# Patient Record
Sex: FEMALE | Race: OTHER | Employment: UNEMPLOYED | URBAN - METROPOLITAN AREA
[De-identification: names, ages, dates, MRNs, and addresses within clinical notes are randomized per-mention and may not be internally consistent; named-entity substitution may affect disease eponyms.]

---

## 2019-01-21 ENCOUNTER — HOSPITAL ENCOUNTER (OUTPATIENT)
Dept: GENERAL RADIOLOGY | Age: 34
Discharge: HOME | End: 2019-01-23
Payer: MEDICAID

## 2019-01-21 DIAGNOSIS — R05.9 COUGH: ICD-10-CM

## 2019-01-21 DIAGNOSIS — M54.32 SCIATICA OF LEFT SIDE: ICD-10-CM

## 2019-01-21 PROCEDURE — 72110 X-RAY EXAM L-2 SPINE 4/>VWS: CPT

## 2019-01-21 PROCEDURE — 71046 X-RAY EXAM CHEST 2 VIEWS: CPT

## 2019-03-22 ENCOUNTER — HOSPITAL ENCOUNTER (OUTPATIENT)
Dept: MRI IMAGING | Age: 34
Discharge: HOME | End: 2019-03-24
Payer: COMMERCIAL

## 2019-03-22 DIAGNOSIS — M54.42 CHRONIC LEFT-SIDED LOW BACK PAIN WITH LEFT-SIDED SCIATICA: ICD-10-CM

## 2019-03-22 DIAGNOSIS — G89.29 CHRONIC LEFT-SIDED LOW BACK PAIN WITH LEFT-SIDED SCIATICA: ICD-10-CM

## 2019-03-22 PROCEDURE — 72148 MRI LUMBAR SPINE W/O DYE: CPT

## 2021-02-07 ENCOUNTER — HOSPITAL ENCOUNTER (OUTPATIENT)
Dept: MRI IMAGING | Age: 36
Discharge: HOME | End: 2021-02-09
Payer: COMMERCIAL

## 2021-02-07 DIAGNOSIS — M54.16 LUMBAR RADICULOPATHY: ICD-10-CM

## 2021-02-07 PROCEDURE — 72158 MRI LUMBAR SPINE W/O & W/DYE: CPT

## 2021-02-07 PROCEDURE — 6360000004 HC RX CONTRAST MEDICATION: Performed by: ANESTHESIOLOGY

## 2021-02-07 PROCEDURE — A9579 GAD-BASE MR CONTRAST NOS,1ML: HCPCS | Performed by: ANESTHESIOLOGY

## 2021-02-07 RX ADMIN — GADOTERIDOL 15 ML: 279.3 INJECTION, SOLUTION INTRAVENOUS at 13:09

## 2021-08-27 ENCOUNTER — HOSPITAL ENCOUNTER (EMERGENCY)
Age: 36
Discharge: LEFT BEFORE DISCHARGE | End: 2021-08-27
Attending: EMERGENCY MEDICINE
Payer: COMMERCIAL

## 2021-08-27 VITALS
DIASTOLIC BLOOD PRESSURE: 62 MMHG | OXYGEN SATURATION: 100 % | BODY MASS INDEX: 28.16 KG/M2 | RESPIRATION RATE: 18 BRPM | WEIGHT: 153 LBS | TEMPERATURE: 97.6 F | HEIGHT: 62 IN | SYSTOLIC BLOOD PRESSURE: 105 MMHG | HEART RATE: 100 BPM

## 2021-08-27 DIAGNOSIS — R20.2 PARESTHESIA: Primary | ICD-10-CM

## 2021-08-27 DIAGNOSIS — M54.40 ACUTE LOW BACK PAIN WITH SCIATICA, SCIATICA LATERALITY UNSPECIFIED, UNSPECIFIED BACK PAIN LATERALITY: ICD-10-CM

## 2021-08-27 LAB
ANION GAP SERPL CALCULATED.3IONS-SCNC: 9 MEQ/L (ref 9–15)
BASOPHILS ABSOLUTE: 0.1 K/UL (ref 0–0.2)
BASOPHILS RELATIVE PERCENT: 1 %
BILIRUBIN URINE: NEGATIVE
BLOOD, URINE: NEGATIVE
BUN BLDV-MCNC: 8 MG/DL (ref 6–20)
CALCIUM SERPL-MCNC: 9.8 MG/DL (ref 8.5–9.9)
CHLORIDE BLD-SCNC: 101 MEQ/L (ref 95–107)
CLARITY: CLEAR
CO2: 25 MEQ/L (ref 20–31)
COLOR: YELLOW
CREAT SERPL-MCNC: 0.54 MG/DL (ref 0.5–0.9)
EOSINOPHILS ABSOLUTE: 0 K/UL (ref 0–0.7)
EOSINOPHILS RELATIVE PERCENT: 0.7 %
GFR AFRICAN AMERICAN: >60
GFR NON-AFRICAN AMERICAN: >60
GLUCOSE BLD-MCNC: 97 MG/DL (ref 70–99)
GLUCOSE URINE: NEGATIVE MG/DL
HCG(URINE) PREGNANCY TEST: NEGATIVE
HCT VFR BLD CALC: 39.4 % (ref 37–47)
HEMOGLOBIN: 13.5 G/DL (ref 12–16)
KETONES, URINE: NEGATIVE MG/DL
LEUKOCYTE ESTERASE, URINE: NEGATIVE
LYMPHOCYTES ABSOLUTE: 1.6 K/UL (ref 1–4.8)
LYMPHOCYTES RELATIVE PERCENT: 23.7 %
MCH RBC QN AUTO: 30.7 PG (ref 27–31.3)
MCHC RBC AUTO-ENTMCNC: 34.2 % (ref 33–37)
MCV RBC AUTO: 89.9 FL (ref 82–100)
MONOCYTES ABSOLUTE: 0.4 K/UL (ref 0.2–0.8)
MONOCYTES RELATIVE PERCENT: 6.4 %
NEUTROPHILS ABSOLUTE: 4.6 K/UL (ref 1.4–6.5)
NEUTROPHILS RELATIVE PERCENT: 68.2 %
NITRITE, URINE: NEGATIVE
PDW BLD-RTO: 13.8 % (ref 11.5–14.5)
PH UA: 5 (ref 5–9)
PLATELET # BLD: 470 K/UL (ref 130–400)
POTASSIUM SERPL-SCNC: 4.6 MEQ/L (ref 3.4–4.9)
PROTEIN UA: NEGATIVE MG/DL
RBC # BLD: 4.38 M/UL (ref 4.2–5.4)
SODIUM BLD-SCNC: 135 MEQ/L (ref 135–144)
SPECIFIC GRAVITY UA: 1.01 (ref 1–1.03)
UROBILINOGEN, URINE: 0.2 E.U./DL
WBC # BLD: 6.8 K/UL (ref 4.8–10.8)

## 2021-08-27 PROCEDURE — 81003 URINALYSIS AUTO W/O SCOPE: CPT

## 2021-08-27 PROCEDURE — 84703 CHORIONIC GONADOTROPIN ASSAY: CPT

## 2021-08-27 PROCEDURE — 85025 COMPLETE CBC W/AUTO DIFF WBC: CPT

## 2021-08-27 PROCEDURE — 36415 COLL VENOUS BLD VENIPUNCTURE: CPT

## 2021-08-27 PROCEDURE — 99283 EMERGENCY DEPT VISIT LOW MDM: CPT

## 2021-08-27 PROCEDURE — 80048 BASIC METABOLIC PNL TOTAL CA: CPT

## 2021-08-27 PROCEDURE — 2580000003 HC RX 258: Performed by: EMERGENCY MEDICINE

## 2021-08-27 PROCEDURE — 6370000000 HC RX 637 (ALT 250 FOR IP): Performed by: EMERGENCY MEDICINE

## 2021-08-27 RX ORDER — SODIUM CHLORIDE 0.9 % (FLUSH) 0.9 %
3 SYRINGE (ML) INJECTION EVERY 8 HOURS
Status: DISCONTINUED | OUTPATIENT
Start: 2021-08-27 | End: 2021-08-27 | Stop reason: HOSPADM

## 2021-08-27 RX ORDER — NICOTINE 21 MG/24HR
1 PATCH, TRANSDERMAL 24 HOURS TRANSDERMAL ONCE
Status: DISCONTINUED | OUTPATIENT
Start: 2021-08-27 | End: 2021-08-27 | Stop reason: HOSPADM

## 2021-08-27 RX ADMIN — Medication 3 ML: at 10:33

## 2021-08-27 ASSESSMENT — ENCOUNTER SYMPTOMS
SORE THROAT: 0
NAUSEA: 1
DIARRHEA: 0
EYE PAIN: 0
COLOR CHANGE: 0
ABDOMINAL PAIN: 0
VOMITING: 0
SHORTNESS OF BREATH: 0
COUGH: 0
SINUS PAIN: 0
EYE REDNESS: 0
EYE DISCHARGE: 0
BACK PAIN: 1

## 2021-08-27 ASSESSMENT — PAIN SCALES - GENERAL: PAINLEVEL_OUTOF10: 10

## 2021-08-27 ASSESSMENT — PAIN DESCRIPTION - PAIN TYPE: TYPE: ACUTE PAIN

## 2021-08-27 ASSESSMENT — PAIN DESCRIPTION - LOCATION: LOCATION: HEAD

## 2021-08-27 NOTE — ED NOTES
Pt left AMA before transfer could be completed.    Pt explained the risks     Radha Cruz RN  08/27/21 0583

## 2021-08-27 NOTE — ED PROVIDER NOTES
[address] [facility] ED  EMERGENCY DEPARTMENT ENCOUNTER      Pt Name: Krystal Hinojosa  MRN: [id number]  Brentongfcarolyn 1985  Date of evaluation: 8/27/2021  Provider: Dominique Jacobson [address] [place] [address]       Chief Complaint   Patient presents with    Numbness     legs, arm, blurred vision, headaches since spinal sx 8/5/21     Chief complaint: Numb and tingling  History of chief complaint: This 64-year-old female presents to the emergency department complaining of some ongoing numbness tingling and generalized weakness in the extremities since having recent spinal surgery on August 5. Patient states this is her second back surgery had a microdiscectomy back in 2006 at the L4-L5 level had recurrent surgery at the [facility] on August 5 at that same level. Patient states following the surgery had immediate severe pain on her right side including her arm and leg and down the right side of her back. Patient states that improved and wishes left with numb tingling sensation more in the lower extremities and low back pain intermittently radiating into the buttocks and posterior leg bilaterally. Patient states the pain and numbness since then seem to progress up the spine and having neck pain, variable severe headaches that move around in location, numb tingling sensation in all 4 extremities worse on the right sided extremities and intermittent blurry vision. Patient denies any loss of bowel or bladder control does have some nausea with the pain no vomiting or diarrhea no fevers cough cold congestion bowels have been moving. Patient states that she called the surgeon's office multiple times they will not let her talk to the doctor and would move up the follow-up appointment scheduled for September 10. Patient states the office I finally told her to go to the [facility] clinic where she did go yesterday they advised her she just needed to go to the ER and have imaging done.   Patient states she was scheduled for an MRI on the 31st but learned yesterday that the insurance denied it. Patient states symptoms have been ongoing since surgery over the past 3 weeks. Nursing Notes were reviewed. REVIEW OF SYSTEMS    (2-9 systems for level 4, 10 or more for level 5)     Review of Systems   Constitutional: Positive for appetite change. Negative for diaphoresis and fever. HENT: Negative for congestion, sinus pain and sore throat. Eyes: Positive for visual disturbance. Negative for pain, discharge and redness. Respiratory: Negative for cough and shortness of breath. Cardiovascular: Negative for chest pain, palpitations and leg swelling. Gastrointestinal: Positive for nausea. Negative for abdominal pain, diarrhea and vomiting. Genitourinary: Negative for difficulty urinating, dysuria, flank pain and hematuria. Musculoskeletal: Positive for back pain and neck pain. Skin: Negative for color change and rash. Neurological: Positive for weakness, numbness and headaches. Except as noted above the remainder of the review of systems was reviewed and negative. PAST MEDICAL HISTORY     Past Medical History:   Diagnosis Date    ADHD (attention deficit hyperactivity disorder)     Asthma     exercise induced         SURGICAL HISTORY       Past Surgical History:   Procedure Laterality Date    BACK SURGERY      age 10    BREAST ENHANCEMENT SURGERY      COSMETIC SURGERY      breast implants    FINGER SURGERY      age 3    LIPOSUCTION           CURRENT MEDICATIONS       Previous Medications    ADDERALL XR 10 MG CAPSULE        MELATONIN 3 MG TABS TABLET    Take 3 mg by mouth daily    MELOXICAM (MOBIC) 7.5 MG TABLET        PRENATAL VIT-FE FUMARATE-FA (PRENATAL VITAMIN PO)    Take by mouth    ZIPRASIDONE (GEODON) 20 MG CAPSULE           ALLERGIES     Amoxicillin; Anesthetics, halogenated;  Oxycodone; and Topiramate    FAMILY HISTORY       Family History   Problem Relation Age of Onset    Breast Cancer Neg Hx     Cancer Neg Hx     Colon Cancer Neg Hx     Diabetes Neg Hx     Eclampsia Neg Hx     Hypertension Neg Hx     Ovarian Cancer Neg Hx      Labor Neg Hx     Spont Abortions Neg Hx     Stroke Neg Hx           SOCIAL HISTORY       Social History     Socioeconomic History    Marital status: Single     Spouse name: None    Number of children: None    Years of education: None    Highest education level: None   Occupational History    None   Tobacco Use    Smoking status: Current Every Day Smoker     Packs/day: 0.50    Smokeless tobacco: Never Used   Vaping Use    Vaping Use: Never used   Substance and Sexual Activity    Alcohol use: Yes     Alcohol/week: 0.0 standard drinks     Comment: ocassionally    Drug use: No    Sexual activity: Never   Other Topics Concern    None   Social History Narrative    None     Social Determinants of Health     Financial Resource Strain:     Difficulty of Paying Living Expenses:    Food Insecurity:     Worried About Running Out of Food in the Last Year:     Ran Out of Food in the Last Year:    Transportation Needs:     Lack of Transportation (Medical):      Lack of Transportation (Non-Medical):    Physical Activity:     Days of Exercise per Week:     Minutes of Exercise per Session:    Stress:     Feeling of Stress :    Social Connections:     Frequency of Communication with Friends and Family:     Frequency of Social Gatherings with Friends and Family:     Attends Synagogue Services:     Active Member of Clubs or Organizations:     Attends Club or Organization Meetings:     Marital Status:    Intimate Partner Violence:     Fear of Current or Ex-Partner:     Emotionally Abused:     Physically Abused:     Sexually Abused:            PHYSICAL EXAM    (up to 7 for level 4, 8 or more for level 5)     ED Triage Vitals [21 0930]   BP Temp Temp Source Pulse Resp SpO2 Height Weight   128/86 97.6 °F (36.4 °C) Temporal 106 18 100 % 5' 2\" (1.575 m) 153 lb (69.4 kg)       Physical Exam   General appearance: Patient is awake alert interactive appropriate nontoxic in no acute distress  Head is atraumatic normocephalic  Eyes pupils are equal and reactive sclera white conjunctive are pink  Oral pharyngeal cavity is pink with good moisture, no exudates or ulcerations no asymmetry, the airway is widely patent  Neck: Supple no meningeal signs no adenopathy no JVD  Heart: Regular rate and rhythm no gross murmurs rubs or clicks  Lungs: Breath sounds are clear with good air movement throughout no active wheezes rales or rhonchi no respiratory distress  Abdomen: Soft nontender with good bowel sounds no rebound rigidity or guarding no firm or pulsatile masses, no gross distention, femoral pulses full and symmetric  Back: There is tenderness to palpation diffusely through the lower lumbar area incision is well-healed no erythema warmth or purulence. Straight leg raise test is negative. No numbness in the groin.  skin: Warm and dry without rashes  Lower extremities: No edema or calf tenderness or asymmetry. Neurologic: Patient is awake alert oriented x3 speech is clear and fluent pupils are equal and reactive extraocular muscles are intact facial symmetry is intact tongue is midline strength testing is symmetric at 5 out of 5 bilaterally in the upper and lower extremities sensation is reported dull to touch in all 4 extremities more so on the right greater than the left, patient does report all her sensation to the right face as well. There is no pronator drift.     DIAGNOSTIC RESULTS       RADIOLOGY:   Non-plain film images such as CT, Ultrasound and MRI are read by the radiologist. Plain radiographic images are visualized and preliminarily interpreted by the emergency physician with the below findings:    Interpretation per the Radiologist below, if available at the time of this note:    No orders to display       LABS:  Labs Reviewed   [address] [facility] DIFFERENTIAL - Abnormal; Notable for the following components:       Result Value    Platelets 726 (*)     All other components within normal limits   CULTURE, BLOOD 1   CULTURE, BLOOD 2   BASIC METABOLIC PANEL   URINALYSIS   PREGNANCY, URINE       All other labs were within normal range or not returned as of this dictation. EMERGENCY DEPARTMENT COURSE and DIFFERENTIAL DIAGNOSIS/MDM:   Vitals:    Vitals:    08/27/21 0930 08/27/21 1030 08/27/21 1100 08/27/21 1130   BP: 128/86 97/70 105/63 105/62   Pulse: 106   100   Resp: 18      Temp: 97.6 °F (36.4 °C)      TempSrc: Temporal      SpO2: 100% 100% 100% 100%   Weight: 153 lb (69.4 kg)      Height: 5' 2\" (1.575 m)          Treatment and course: Patient had an IV established basic blood work was sent. Patient placed on ER observation awaiting bed availability for transport to the [facility]. NicoDerm patch 14 mg was applied    Coordination of CARE: A call was placed out to the [facility] to discuss transfer for neurosurgical evaluation and determination of imaging-I did speak with the patient's neurosurgeon Dr. Dori Kendrick who advised to transfer the patient up to [facility] for admission and will have neurosurgical evaluation there. Call was placed out to the [facility] transfer line I spoke with Dr. Nina Parekh hospitalist who will arrange admission with neurosurgery on consult. FINAL IMPRESSION      1. Paresthesia    2. Acute low back pain with sciatica, sciatica laterality unspecified, unspecified back pain laterality          DISPOSITION/PLAN   DISPOSITION Decision To Transfer 08/27/2021 10:20:38 AM  Patient awaiting a bed placement at the [facility] for transport. Addendum: Patient states she has to go her child school called her and he is missing, states she is having custody issues with his father now and has to go immediately.   Patient states she has to make sure he is safe and she will return to the hospital. Patient left the emergency department [address] I did stressed to patient the need for further neurosurgical evaluation she expresses understanding and states she will return as soon as possible    PATIENT REFERRED TO:  No follow-up provider specified. DISCHARGE MEDICATIONS:  New Prescriptions    No medications on file     Controlled Substances Monitoring:     No flowsheet data found.     (Please note that portions of this note were completed with a voice recognition program.  Efforts were made to edit the dictations but occasionally words are mis-transcribed.)    Florentino Guzman DO (electronically signed)  Attending Emergency Physician            Florentino Guzman DO  08/27/21 [facility]  08/27/21 [facility]

## 2021-09-03 PROBLEM — Z98.890 OTHER SPECIFIED POSTPROCEDURAL STATES: Status: ACTIVE | Noted: 2019-03-25

## 2021-09-03 PROBLEM — B36.0 TINEA VERSICOLOR: Status: ACTIVE | Noted: 2020-05-27

## 2021-09-03 PROBLEM — F31.9 BIPOLAR AFFECTIVE DISORDER (HCC): Status: ACTIVE | Noted: 2021-09-03

## 2021-09-03 PROBLEM — K22.9 ESOPHAGEAL DISORDER: Status: ACTIVE | Noted: 2019-03-25

## 2021-09-03 PROBLEM — M54.50 CHRONIC LEFT-SIDED LOW BACK PAIN: Status: ACTIVE | Noted: 2021-07-16

## 2021-09-03 PROBLEM — G89.29 CHRONIC LEFT-SIDED LOW BACK PAIN: Status: ACTIVE | Noted: 2021-07-16

## 2021-09-03 PROBLEM — L30.9 ECZEMA: Status: ACTIVE | Noted: 2020-05-27

## 2021-09-03 PROBLEM — D22.9 MULTIPLE BENIGN NEVI: Status: ACTIVE | Noted: 2020-05-27

## 2021-09-03 PROBLEM — R20.0 ANESTHESIA OF SKIN: Status: ACTIVE | Noted: 2019-03-25

## 2021-09-03 PROBLEM — Z88.0 ALLERGY STATUS TO PENICILLIN: Status: ACTIVE | Noted: 2019-03-25

## 2021-09-03 PROBLEM — G89.18 POSTOPERATIVE PAIN: Status: ACTIVE | Noted: 2021-08-05

## 2021-09-07 ENCOUNTER — OFFICE VISIT (OUTPATIENT)
Dept: NEUROLOGY | Age: 36
End: 2021-09-07
Payer: COMMERCIAL

## 2021-09-07 VITALS
BODY MASS INDEX: 28.68 KG/M2 | SYSTOLIC BLOOD PRESSURE: 130 MMHG | OXYGEN SATURATION: 98 % | DIASTOLIC BLOOD PRESSURE: 80 MMHG | WEIGHT: 156.8 LBS | HEART RATE: 98 BPM

## 2021-09-07 DIAGNOSIS — R20.2 PARESTHESIA OF BOTH LOWER EXTREMITIES: Primary | ICD-10-CM

## 2021-09-07 DIAGNOSIS — G44.52 NEW DAILY PERSISTENT HEADACHE: ICD-10-CM

## 2021-09-07 DIAGNOSIS — Z76.89 ENCOUNTER TO ESTABLISH CARE: ICD-10-CM

## 2021-09-07 DIAGNOSIS — Z98.890 S/P LUMBAR LAMINECTOMY: ICD-10-CM

## 2021-09-07 PROCEDURE — 4004F PT TOBACCO SCREEN RCVD TLK: CPT | Performed by: NURSE PRACTITIONER

## 2021-09-07 PROCEDURE — G8419 CALC BMI OUT NRM PARAM NOF/U: HCPCS | Performed by: NURSE PRACTITIONER

## 2021-09-07 PROCEDURE — 99204 OFFICE O/P NEW MOD 45 MIN: CPT | Performed by: NURSE PRACTITIONER

## 2021-09-07 PROCEDURE — G8427 DOCREV CUR MEDS BY ELIG CLIN: HCPCS | Performed by: NURSE PRACTITIONER

## 2021-09-07 RX ORDER — PHENTERMINE HYDROCHLORIDE 37.5 MG/1
TABLET ORAL
COMMUNITY
Start: 2021-07-27 | End: 2021-09-20 | Stop reason: ALTCHOICE

## 2021-09-07 RX ORDER — BUTALBITAL, ACETAMINOPHEN AND CAFFEINE 50; 325; 40 MG/1; MG/1; MG/1
1 TABLET ORAL EVERY 8 HOURS PRN
Qty: 21 TABLET | Refills: 3 | Status: SHIPPED | OUTPATIENT
Start: 2021-09-07 | End: 2021-10-22

## 2021-09-07 RX ORDER — SUMATRIPTAN 50 MG/1
50 TABLET, FILM COATED ORAL
Qty: 9 TABLET | Refills: 3 | Status: SHIPPED | OUTPATIENT
Start: 2021-09-07 | End: 2021-10-22 | Stop reason: ALTCHOICE

## 2021-09-07 RX ORDER — CYCLOBENZAPRINE HCL 10 MG
10 TABLET ORAL 2 TIMES DAILY PRN
COMMUNITY
End: 2022-08-05 | Stop reason: SDUPTHER

## 2021-09-07 RX ORDER — PHENTERMINE HYDROCHLORIDE 30 MG/1
CAPSULE ORAL
COMMUNITY
Start: 2021-06-27 | End: 2021-09-20 | Stop reason: ALTCHOICE

## 2021-09-07 RX ORDER — METHOCARBAMOL 750 MG/1
TABLET, FILM COATED ORAL
COMMUNITY
Start: 2021-08-05 | End: 2021-09-20 | Stop reason: ALTCHOICE

## 2021-09-07 ASSESSMENT — ENCOUNTER SYMPTOMS
COUGH: 0
COLOR CHANGE: 0
CHEST TIGHTNESS: 0
DIARRHEA: 0
WHEEZING: 0
CONSTIPATION: 0
VOMITING: 0
PHOTOPHOBIA: 1
ABDOMINAL PAIN: 0
ABDOMINAL DISTENTION: 0
TROUBLE SWALLOWING: 0
NAUSEA: 1
SHORTNESS OF BREATH: 0
BACK PAIN: 1

## 2021-09-07 NOTE — PROGRESS NOTES
on 8/26/2021. Urgent care sent her to the emergency room for further evaluation. Patient went to [facility] emergency department on 8/27/2021. At that time they planned on transferring patient to the [facility] to be admitted under the neurosurgery service. Patient actually signed out of the emergency room AMA at that time due to social issues. Patient then presented back to [facility] emergency room on 8/28/2021. At that time she had MRI of the cervical, thoracic, lumbar spine. Results showed postoperative changes with residual/recurrent left paracentral disc protrusion/extrusion at L4-5 likely impinging on the descending left L4 nerve root in combination with epidural scarring/fibrosis contributing to moderate effacement of the left neural mendoza and contacting the exiting left L4 nerve root. Patient was discharged home with plans for follow-up with neurosurgery outpatient. She was also referred to neurology but declined to see anyone at the [facility] clinic and presented here for further evaluation. Patient reports a lot of frustration with her surgeon as she has had difficulty coordinating care and being seen. She does have an appointment on 9/10/2021 with neurosurgery but this is a virtual visit and she is not being seen in person. Patient presents today alert and oriented x3, no acute distress, cooperative. She reports continued paresthesias in all of her extremities from the shoulders to the fingertips and the hips to the toes. She reports some occasional weakness grasping things. Denies foot drop, tripping, legs giving out or falls. Denies bowel or bladder dysfunction or saddle anesthesia. No fevers. Lower extremity reflexes are actually 2+ and intact today. There are no focal findings on her exam.  Strength is intact throughout 5 out of 5 with the exception of hand grasp which are 4 out of 5. Surgical incision looks well approximated and healed.   There is no perisurgical erythema, fluctuance, induration. Patient is ambulating without difficulty. In regards to her headaches she states that she began having a headache around August 23, 2021. She describes it as an ice pick to the left side in the temporal region that goes into the back of her neck. She rates it 10 out of 10 and describes it as constant. She reports she will only have approximately 2 hours out of the day where she does not have a headache. She states she has associated nausea, photophobia, phonophobia, and spots in her vision with headaches. She denies any history of migraine headaches with the exception of when she was on birth control which she states caused her to have similar headaches. Headaches do improve with lying down. Past Medical History:   Diagnosis Date    ADHD (attention deficit hyperactivity disorder)     Asthma     exercise induced    [facility] 9/3/2021     Past Surgical History:   Procedure Laterality Date    BACK SURGERY      age 10    BREAST ENHANCEMENT SURGERY      COSMETIC SURGERY      breast implants    FINGER SURGERY      age 3    LIPOSUCTION       Social History     Socioeconomic History    Marital status: Single     Spouse name: Not on file    Number of children: Not on file    Years of education: Not on file    Highest education level: Not on file   Occupational History    Not on file   Tobacco Use    Smoking status: Current Every Day Smoker     Packs/day: 0.50    Smokeless tobacco: Never Used   Vaping Use    Vaping Use: Never used   Substance and Sexual Activity    Alcohol use:  Yes     Alcohol/week: 0.0 standard drinks     Comment: ocassionally    Drug use: No    Sexual activity: Never   Other Topics Concern    Not on file   Social History Narrative    Not on file     Social Determinants of Health     Financial Resource Strain:     Difficulty of Paying Living Expenses:    Food Insecurity:     Worried About Running Out of Food in the Last Year:     Ran Out of Food in the Last Year:    Transportation Needs:     Lack of Transportation (Medical):      Lack of Transportation (Non-Medical):    Physical Activity:     Days of Exercise per Week:     Minutes of Exercise per Session:    Stress:     Feeling of Stress :    Social Connections:     Frequency of Communication with Friends and Family:     Frequency of Social Gatherings with Friends and Family:     Attends Hoahaoism Services:     Active Member of Clubs or Organizations:     Attends Club or Organization Meetings:     Marital Status:    Intimate Partner Violence:     Fear of Current or Ex-Partner:     Emotionally Abused:     Physically Abused:     Sexually Abused:      Family History   Problem Relation Age of Onset    Breast Cancer Neg Hx     Cancer Neg Hx     Colon Cancer Neg Hx     Diabetes Neg Hx     Eclampsia Neg Hx     Hypertension Neg Hx     Ovarian Cancer Neg Hx      Labor Neg Hx     Spont Abortions Neg Hx     Stroke Neg Hx      Allergies   Allergen Reactions    Amoxicillin Hives    Anesthetics, Halogenated      Hard to come out of it, hyperventilating, convulsion, nonresponsive    Oxycodone Nausea Only    Oxycodone-Acetaminophen Hives and Nausea And Vomiting    Topiramate Rash     Current Outpatient Medications on File Prior to Visit   Medication Sig Dispense Refill    phentermine (ADIPEX-P) 37.5 MG tablet TAKE ONE TABLET BY MOUTH EVERY DAY      phentermine 30 MG capsule TAKE ONE CAPSULE BY MOUTH EVERY DAY      methocarbamol (ROBAXIN) 750 MG tablet TAKE 1 TABLET BY MOUTH 3 TIMES A DAY AS NEEDED FOR MUSCLE SPASM      cyclobenzaprine (FLEXERIL) 10 MG tablet Take 10 mg by mouth 2 times daily      Prenatal Vit-Fe Fumarate-FA (PRENATAL VITAMIN PO) Take by mouth (Patient not taking: Reported on 2021)      melatonin 3 MG TABS tablet Take 3 mg by mouth daily (Patient not taking: Reported on 2021)      ADDERALL XR 10 MG capsule  (Patient not taking: Reported on 9/7/2021)  0    meloxicam (MOBIC) 7.5 MG tablet  (Patient not taking: Reported on 9/7/2021)  1    ziprasidone (GEODON) 20 MG capsule  (Patient not taking: Reported on 9/7/2021)  3     No current facility-administered medications on file prior to visit. Review of Systems   Constitutional: Negative for appetite change, chills, fatigue and fever. HENT: Negative for hearing loss and trouble swallowing. Eyes: Positive for photophobia and visual disturbance. Respiratory: Negative for cough, chest tightness, shortness of breath and wheezing. Cardiovascular: Negative for chest pain, palpitations and leg swelling. Gastrointestinal: Positive for nausea. Negative for abdominal distention, abdominal pain, constipation, diarrhea and vomiting. Genitourinary: Negative for difficulty urinating. Musculoskeletal: Positive for back pain. Negative for gait problem, neck pain and neck stiffness. Skin: Negative for color change and rash. Neurological: Positive for weakness and headaches. Negative for dizziness, tremors, seizures, syncope, facial asymmetry, speech difficulty, light-headedness and numbness. Psychiatric/Behavioral: Negative for agitation, confusion and hallucinations. The patient is not nervous/anxious. Objective:   /80 (Site: Left Upper Arm, Position: Sitting, Cuff Size: Medium Adult)   Pulse 98   Wt 156 lb 12.8 oz (71.1 kg)   LMP 08/20/2021   SpO2 98%   BMI 28.68 kg/m²     Physical Exam  Vitals reviewed. Constitutional:       General: She is not in acute distress. Appearance: She is not ill-appearing or diaphoretic. HENT:      Head: Normocephalic and atraumatic. Eyes:      General: No visual field deficit. Extraocular Movements: Extraocular movements intact. Pupils: Pupils are equal, round, and reactive to light. Cardiovascular:      Rate and Rhythm: Normal rate and regular rhythm.    Pulmonary:      Effort: Pulmonary effort is normal. No spine and these were reviewed. Reflexes are intact and neuro exam is normal.  Patient's case and MRI findings were discussed in detail with Dr. Kenneth Stokes. Patient was advised that she will need to follow-up with her neurosurgeon. We recommend that she be seen in person rather than a virtual visit given the ongoing nature of her complaints. 4. Encounter to establish care      Return in about 6 weeks (around 10/19/2021), or if symptoms worsen or fail to improve.     Darell Mittal APRN - CNP     Collaborating Physician Dr Jean-Paul Blackwell

## 2021-09-09 ENCOUNTER — TELEPHONE (OUTPATIENT)
Dept: NEUROLOGY | Age: 36
End: 2021-09-09

## 2021-09-09 DIAGNOSIS — G44.52 NEW DAILY PERSISTENT HEADACHE: Primary | ICD-10-CM

## 2021-09-09 DIAGNOSIS — H47.10 PAPILLEDEMA: ICD-10-CM

## 2021-09-09 DIAGNOSIS — H53.9 VISION CHANGES: ICD-10-CM

## 2021-09-09 NOTE — TELEPHONE ENCOUNTER
Dr. Cesario Dancer called in regards to this patient. She states that patient was in for eye exam and she would like to speak with you in regards to this and has asked for a returned call. Her personal number is [phone] and she states that you can call at any time.

## 2021-09-10 NOTE — TELEPHONE ENCOUNTER
Spoke with patient and was updated on her visit with the neurosurgeon today. She reports he would like to complete the work-up with MRI and MRV prior to any further recommendations from him. I change these orders to stat.

## 2021-09-10 NOTE — TELEPHONE ENCOUNTER
Called and spoke with Dr Tony Martin who states that at pt appt yesterday there was one small area of possible elevation/papilledema. Pt to follow up next week for another exam. Called and spoke with pt today who is on her way to neurosurgery appt at main [facility]. She reports ongoing headaches and vision changes. She has tried imitrex and fioricet with little rleif. Will order MRI and MRV to assess for IIH/venous stenosis or thrombosis. Pt to call and update me after neurosurgery appt. Will discuss topamax at that time.

## 2021-09-14 ENCOUNTER — HOSPITAL ENCOUNTER (OUTPATIENT)
Dept: MRI IMAGING | Age: 36
Discharge: HOME | End: 2021-09-16
Payer: COMMERCIAL

## 2021-09-14 DIAGNOSIS — H53.9 VISION CHANGES: ICD-10-CM

## 2021-09-14 DIAGNOSIS — G44.52 NEW DAILY PERSISTENT HEADACHE: ICD-10-CM

## 2021-09-14 DIAGNOSIS — H47.10 PAPILLEDEMA: ICD-10-CM

## 2021-09-14 PROCEDURE — 70544 MR ANGIOGRAPHY HEAD W/O DYE: CPT

## 2021-09-14 PROCEDURE — 70551 MRI BRAIN STEM W/O DYE: CPT

## 2021-09-15 ENCOUNTER — TELEPHONE (OUTPATIENT)
Dept: NEUROLOGY | Age: 36
End: 2021-09-15

## 2021-09-15 DIAGNOSIS — G43.119 INTRACTABLE MIGRAINE WITH AURA WITHOUT STATUS MIGRAINOSUS: Primary | ICD-10-CM

## 2021-09-15 RX ORDER — RIMEGEPANT SULFATE 75 MG/75MG
75 TABLET, ORALLY DISINTEGRATING ORAL DAILY PRN
Qty: 8 TABLET | Refills: 3 | Status: SHIPPED | OUTPATIENT
Start: 2021-09-15 | End: 2021-10-11 | Stop reason: SDUPTHER

## 2021-09-15 NOTE — TELEPHONE ENCOUNTER
Reviewed results of MRI and MRV which were normal.  There is no evidence of venous sinus stenosis or thrombosis. Called and discussed with patient. She still having ongoing headaches with associated blurred vision, dizziness. Has tried Imitrex and Fioricet with no improvement. Will initiate Nurtec for abortive treatment. Offered preventative treatment but patient declined at this time.

## 2021-09-20 ENCOUNTER — OFFICE VISIT (OUTPATIENT)
Dept: FAMILY MEDICINE CLINIC | Age: 36
End: 2021-09-20
Payer: COMMERCIAL

## 2021-09-20 VITALS
SYSTOLIC BLOOD PRESSURE: 100 MMHG | HEIGHT: 62 IN | OXYGEN SATURATION: 98 % | HEART RATE: 103 BPM | BODY MASS INDEX: 28.78 KG/M2 | WEIGHT: 156.4 LBS | DIASTOLIC BLOOD PRESSURE: 60 MMHG

## 2021-09-20 DIAGNOSIS — H53.9 VISION CHANGES: ICD-10-CM

## 2021-09-20 DIAGNOSIS — K21.9 GASTROESOPHAGEAL REFLUX DISEASE, UNSPECIFIED WHETHER ESOPHAGITIS PRESENT: ICD-10-CM

## 2021-09-20 DIAGNOSIS — G43.909 MIGRAINE WITHOUT STATUS MIGRAINOSUS, NOT INTRACTABLE, UNSPECIFIED MIGRAINE TYPE: ICD-10-CM

## 2021-09-20 DIAGNOSIS — Z76.89 ENCOUNTER TO ESTABLISH CARE: Primary | ICD-10-CM

## 2021-09-20 DIAGNOSIS — Z23 NEED FOR INFLUENZA VACCINATION: ICD-10-CM

## 2021-09-20 DIAGNOSIS — Z98.890 STATUS POST LUMBAR LAMINECTOMY: ICD-10-CM

## 2021-09-20 DIAGNOSIS — R20.2 PARESTHESIAS: ICD-10-CM

## 2021-09-20 PROCEDURE — 4004F PT TOBACCO SCREEN RCVD TLK: CPT | Performed by: STUDENT IN AN ORGANIZED HEALTH CARE EDUCATION/TRAINING PROGRAM

## 2021-09-20 PROCEDURE — G8419 CALC BMI OUT NRM PARAM NOF/U: HCPCS | Performed by: STUDENT IN AN ORGANIZED HEALTH CARE EDUCATION/TRAINING PROGRAM

## 2021-09-20 PROCEDURE — 90471 IMMUNIZATION ADMIN: CPT | Performed by: STUDENT IN AN ORGANIZED HEALTH CARE EDUCATION/TRAINING PROGRAM

## 2021-09-20 PROCEDURE — 99204 OFFICE O/P NEW MOD 45 MIN: CPT | Performed by: STUDENT IN AN ORGANIZED HEALTH CARE EDUCATION/TRAINING PROGRAM

## 2021-09-20 PROCEDURE — G8427 DOCREV CUR MEDS BY ELIG CLIN: HCPCS | Performed by: STUDENT IN AN ORGANIZED HEALTH CARE EDUCATION/TRAINING PROGRAM

## 2021-09-20 PROCEDURE — 90674 CCIIV4 VAC NO PRSV 0.5 ML IM: CPT | Performed by: STUDENT IN AN ORGANIZED HEALTH CARE EDUCATION/TRAINING PROGRAM

## 2021-09-20 RX ORDER — ESOMEPRAZOLE MAGNESIUM 20 MG/1
20 FOR SUSPENSION ORAL 2 TIMES DAILY
COMMUNITY
End: 2021-09-20 | Stop reason: ALTCHOICE

## 2021-09-20 SDOH — ECONOMIC STABILITY: FOOD INSECURITY: WITHIN THE PAST 12 MONTHS, YOU WORRIED THAT YOUR FOOD WOULD RUN OUT BEFORE YOU GOT MONEY TO BUY MORE.: NEVER TRUE

## 2021-09-20 SDOH — ECONOMIC STABILITY: FOOD INSECURITY: WITHIN THE PAST 12 MONTHS, THE FOOD YOU BOUGHT JUST DIDN'T LAST AND YOU DIDN'T HAVE MONEY TO GET MORE.: NEVER TRUE

## 2021-09-20 SDOH — ECONOMIC STABILITY: TRANSPORTATION INSECURITY
IN THE PAST 12 MONTHS, HAS THE LACK OF TRANSPORTATION KEPT YOU FROM MEDICAL APPOINTMENTS OR FROM GETTING MEDICATIONS?: NO

## 2021-09-20 SDOH — ECONOMIC STABILITY: TRANSPORTATION INSECURITY
IN THE PAST 12 MONTHS, HAS LACK OF TRANSPORTATION KEPT YOU FROM MEETINGS, WORK, OR FROM GETTING THINGS NEEDED FOR DAILY LIVING?: NO

## 2021-09-20 ASSESSMENT — PATIENT HEALTH QUESTIONNAIRE - PHQ9
SUM OF ALL RESPONSES TO PHQ QUESTIONS 1-9: 0
SUM OF ALL RESPONSES TO PHQ9 QUESTIONS 1 & 2: 0
2. FEELING DOWN, DEPRESSED OR HOPELESS: 0
SUM OF ALL RESPONSES TO PHQ QUESTIONS 1-9: 0
1. LITTLE INTEREST OR PLEASURE IN DOING THINGS: 0
SUM OF ALL RESPONSES TO PHQ QUESTIONS 1-9: 0

## 2021-09-20 ASSESSMENT — SOCIAL DETERMINANTS OF HEALTH (SDOH): HOW HARD IS IT FOR YOU TO PAY FOR THE VERY BASICS LIKE FOOD, HOUSING, MEDICAL CARE, AND HEATING?: SOMEWHAT HARD

## 2021-09-20 ASSESSMENT — ENCOUNTER SYMPTOMS
COUGH: 0
ABDOMINAL PAIN: 0
DIARRHEA: 0
SORE THROAT: 0
EYE DISCHARGE: 0
NAUSEA: 0
WHEEZING: 0
RHINORRHEA: 0
VOMITING: 0
BACK PAIN: 1
SHORTNESS OF BREATH: 0

## 2021-09-20 NOTE — PATIENT INSTRUCTIONS
Family and Housing Resources    [address]  Call 211  or - www. [address] [place] [address]. [address] [place] [address])  Call - [phone]  [email or web address] [facility]  [address]  [place], [facility] [address]  [facility] [address]  [address] [facility] [address]  [phone] /[phone]    Medicaid Application  Https://benefits. [place].gov/  [phone]    Home Energy Assistance Programs (HEAP)  [address]. [facility], [address] [facility]  [phone]    [facility] ( shelter)  [address] [facility], [address]    [facility] (halfway)  [address], [address] [facility]  [address] [facility]  www. [email or web address]    [employer]  [address] [place] [address], [address]  [address] [facility]  [address]   [facility] [address] [address] [facility]. .[facility] at [address] [facility]

## 2021-09-20 NOTE — PROGRESS NOTES
Vaccine Information Sheet, \"Influenza - Inactivated\"  given to Peter Grossman, or parent/legal guardian of  Tennis Opal and verbalized understanding. Patient responses:    Have you ever had a reaction to a flu vaccine? NO    Are you able to eat eggs without adverse effects? YES    Do you have any current illness? NO    Have you ever had Guillian [place] Syndrome? NO    Flu vaccine given per order. Please see immunization tab.

## 2021-09-20 NOTE — PROGRESS NOTES
Subjective  Rosaura Jernigan, 29 y.o. female presents today with:  Chief Complaint   Patient presents with   [address]     Was being seen by Dr. Lacy Gonzalez prior.  Numbness     Has numbness & tingling down both legs & arms since back surgery in Aug. 2021.  Migraine     Has been having migraines everyday since having back surgery.  Blurred Vision     Has been having blurred vision with spots since her back surgery as well. Did see any eye Dr. Kiana Wyatt Other     Has been having complications since back surgery in Aug. Did see neuro surgeon last week was told to see nurologist; seen neurologist had MRI brain done. Was told to see PCP. HPI    Patient here to establish care. She was previously seeing Dr. Duncan More. She has had multiple issues over the last 6 or so weeks. She had an extensive back surgery done in August.  She states that she has been having complications from that since then. She states she is having numbness and tingling in both arms and legs since her back surgery. She has been having every day migraine since having her back surgery. She states she is also been having issues with her vision since her back surgery. She last saw her surgeon on the 10th. This was a virtual visit. Patient reports that he is not doing much for her. She does not have a future appointment scheduled. She is planning on calling the office today to do this. She states that she was told it was likely from the anesthesia. She states that she has not gotten a good answer for her symptoms. She was then referred to neurology. She had an appointment with them a little over a week ago. She states that they did order an MRI and MRV of her brain. These were normal.  She was noted to have papilledema. She was evaluated by ophthalmology. She was also started on migraine medication. She states that she is following up with them in about 4 weeks.   She states that she is getting very little relief rales.   Abdominal:      General: Bowel sounds are normal. There is no distension. Palpations: Abdomen is soft. Tenderness: There is no abdominal tenderness. Musculoskeletal:         General: No swelling or deformity. Cervical back: Normal range of motion and neck supple. Right lower leg: No edema. Left lower leg: No edema. Lymphadenopathy:      Cervical: No cervical adenopathy. Skin:     General: Skin is warm and dry. Findings: No rash. Neurological:      General: No focal deficit present. Mental Status: She is alert. Motor: No weakness. Gait: Gait is intact. Gait normal.      Deep Tendon Reflexes: Reflexes normal.   Psychiatric:         Mood and Affect: Mood normal.         Behavior: Behavior normal.       Assessment & Plan     1. Encounter to establish care  Patient here to establish care. Was previously seeing Dr. July Chilel. 2. Gastroesophageal reflux disease, unspecified whether esophagitis present  Stable, chronic. Nexium refilled. We will hold off on GI referral at this time. Continue to monitor.  - esomeprazole (NEXIUM) 20 MG delayed release capsule; Take 1 capsule by mouth 2 times daily  Dispense: 60 capsule; Refill: 3    3. Migraine without status migrainosus, not intractable, unspecified migraine type  Patient following with neurology. She is instructed to keep all appointments as scheduled. She is going to call them later today to see if her appointment can be moved up due to new medications not helping her migraines as much. 4. Paresthesias  Patient with persistent paresthesias of upper and lower extremities following neurosurgery. She is following with neurology and neurosurgery for this. 5. Status post lumbar laminectomy  Continue to follow with neurosurgery. 6. Vision changes  Continue to follow with ophthalmology, neurology and neurosurgery. She was told that she may have the start of papilledema by ophthalmology.   She did have MRI done of the brain. Continue to follow with specialist.    7. Need for influenza vaccination  Flu shot given  - INFLUENZA, MDCK QUADV, 2 YRS AND OLDER, IM, PF, PREFILL SYR OR SDV, 0.5ML (FLUCELVAX QUADV, PF)    Orders Placed This Encounter   Procedures    INFLUENZA, MDCK QUADV, 2 YRS AND OLDER, IM, PF, PREFILL SYR OR SDV, 0.5ML (FLUCELVAX QUADV, PF)     Orders Placed This Encounter   Medications    esomeprazole (NEXIUM) 20 MG delayed release capsule     Sig: Take 1 capsule by mouth 2 times daily     Dispense:  60 capsule     Refill:  3     Medications Discontinued During This Encounter   Medication Reason    ADDERALL XR 10 MG capsule Therapy completed    melatonin 3 MG TABS tablet Therapy completed    meloxicam (MOBIC) 7.5 MG tablet Therapy completed    methocarbamol (ROBAXIN) 750 MG tablet Therapy completed    phentermine (ADIPEX-P) 37.5 MG tablet Therapy completed    phentermine 30 MG capsule Therapy completed    Prenatal Vit-Fe Fumarate-FA (PRENATAL VITAMIN PO) Therapy completed    ziprasidone (GEODON) 20 MG capsule Therapy completed    esomeprazole Magnesium (NEXIUM) 20 MG PACK Therapy completed     Return in about 4 weeks (around 10/18/2021) for follow up.    50 minutes spent talking with the patient and reviewing the chart. Reviewed with the patient: current clinical status,medications, activities and diet. treatment plan/ rationale and result expectations have been discussed with the patient who expresses understanding and desires to proceed. Close follow up to evaluate treatment results and for coordination of care. I have reviewed the patient's medical history in detail and updated the computerized patient record. Please note, this report has been partially produced using speech recognition software and may cause  and /or contain errors related to that system including grammar, punctuation and spelling as well as words and phrases that may seem inappropriate.  If there are questions or concerns please feel free to contact me to clarify.     Theadore Class, DO

## 2021-09-22 ENCOUNTER — TELEPHONE (OUTPATIENT)
Dept: FAMILY MEDICINE CLINIC | Age: 36
End: 2021-09-22

## 2021-09-28 ENCOUNTER — TELEPHONE (OUTPATIENT)
Dept: NEUROLOGY | Age: 36
End: 2021-09-28

## 2021-09-28 NOTE — TELEPHONE ENCOUNTER
Msg left on  for patient to call office to change appt on 10/25/21.   [facility] not in office that day

## 2021-09-28 NOTE — TELEPHONE ENCOUNTER
Called pt to reschedule due to provider being out on 10/25/21  But pt says she does not know what to do because her fmla runs out on the 25th that's why she was coming in that day. Are we able to get her in sooner than that?

## 2021-09-28 NOTE — TELEPHONE ENCOUNTER
I am happy to see her during the week of the 11th or you can see if she can follow-up with another provider.   Thank you

## 2021-10-07 PROBLEM — R20.2 PARESTHESIA OF BOTH LOWER EXTREMITIES: Status: ACTIVE | Noted: 2021-10-07

## 2021-10-07 PROBLEM — Z98.890 S/P LUMBAR LAMINECTOMY: Status: ACTIVE | Noted: 2021-10-07

## 2021-10-07 PROBLEM — G44.52 NEW DAILY PERSISTENT HEADACHE: Status: ACTIVE | Noted: 2021-10-07

## 2021-10-11 ENCOUNTER — OFFICE VISIT (OUTPATIENT)
Dept: NEUROLOGY | Age: 36
End: 2021-10-11
Payer: COMMERCIAL

## 2021-10-11 VITALS
DIASTOLIC BLOOD PRESSURE: 72 MMHG | WEIGHT: 161 LBS | HEART RATE: 81 BPM | BODY MASS INDEX: 29.45 KG/M2 | SYSTOLIC BLOOD PRESSURE: 118 MMHG

## 2021-10-11 DIAGNOSIS — Z98.890 S/P LUMBAR LAMINECTOMY: ICD-10-CM

## 2021-10-11 DIAGNOSIS — G44.52 NEW DAILY PERSISTENT HEADACHE: Primary | ICD-10-CM

## 2021-10-11 DIAGNOSIS — R20.2 PARESTHESIA OF UPPER AND LOWER EXTREMITIES OF BOTH SIDES: ICD-10-CM

## 2021-10-11 DIAGNOSIS — G43.119 INTRACTABLE MIGRAINE WITH AURA WITHOUT STATUS MIGRAINOSUS: ICD-10-CM

## 2021-10-11 DIAGNOSIS — H47.10 PAPILLEDEMA: ICD-10-CM

## 2021-10-11 PROCEDURE — 99214 OFFICE O/P EST MOD 30 MIN: CPT | Performed by: NURSE PRACTITIONER

## 2021-10-11 PROCEDURE — G8419 CALC BMI OUT NRM PARAM NOF/U: HCPCS | Performed by: NURSE PRACTITIONER

## 2021-10-11 PROCEDURE — 4004F PT TOBACCO SCREEN RCVD TLK: CPT | Performed by: NURSE PRACTITIONER

## 2021-10-11 PROCEDURE — G8428 CUR MEDS NOT DOCUMENT: HCPCS | Performed by: NURSE PRACTITIONER

## 2021-10-11 PROCEDURE — G8482 FLU IMMUNIZE ORDER/ADMIN: HCPCS | Performed by: NURSE PRACTITIONER

## 2021-10-11 RX ORDER — BUTALBITAL, ACETAMINOPHEN AND CAFFEINE 50; 325; 40 MG/1; MG/1; MG/1
1 TABLET ORAL EVERY 8 HOURS PRN
Qty: 21 TABLET | Refills: 3 | Status: CANCELLED | OUTPATIENT
Start: 2021-10-11 | End: 2021-10-18

## 2021-10-11 RX ORDER — SUMATRIPTAN 50 MG/1
50 TABLET, FILM COATED ORAL
Qty: 9 TABLET | Refills: 3 | Status: CANCELLED | OUTPATIENT
Start: 2021-10-11 | End: 2021-10-11

## 2021-10-11 RX ORDER — RIMEGEPANT SULFATE 75 MG/75MG
75 TABLET, ORALLY DISINTEGRATING ORAL DAILY PRN
Qty: 8 TABLET | Refills: 3 | Status: SHIPPED | OUTPATIENT
Start: 2021-10-11 | End: 2021-12-27 | Stop reason: ALTCHOICE

## 2021-10-11 ASSESSMENT — ENCOUNTER SYMPTOMS
VOMITING: 1
BACK PAIN: 1
WHEEZING: 0
COLOR CHANGE: 0
ABDOMINAL PAIN: 0
TROUBLE SWALLOWING: 0
SHORTNESS OF BREATH: 0
ABDOMINAL DISTENTION: 0
COUGH: 0
CHEST TIGHTNESS: 0
NAUSEA: 1
PHOTOPHOBIA: 1

## 2021-10-11 NOTE — LETTER
[address] [place] [address] Neurology  [address] [facility]  [place] [address]  Phone: [phone]  Fax: [phone] PARKER Gonzalez - CNP        October 11, 2021     Patient: Windy Giron   YOB: 1985   Date of Visit: 10/11/2021       To Whom It May Concern: It is my medical opinion that Rafy Almanza should remain out of work until follow up with neurology in 8 weeks. If you have any questions or concerns, please don't hesitate to call.     Sincerely,        PARKER Bhatia - CNP

## 2021-10-11 NOTE — PROGRESS NOTES
Subjective:      Patient ID: Radha Pierre is a 29 y.o. female who presents today for:  Chief Complaint   Patient presents with    Follow-up     Pt states that her migraines are getting worse, she says nausea is increased and is now vomitting. She says that when she gets hot it makes it worse, or when she is in the sun she say it will take her off her feet. She says that that the numbness and tingling in her arms and legs is worsening as well. HPI  10/11/2021:  Seen and examined today for 4-week follow-up for paresthesias of all 4 extremities as well as daily headaches. Patient symptoms began status post lumbar laminectomy that she had done on 8/5/2021 at the [facility] clinic. When last seen by me she was nonfocal  And reflexes were intact. MRI of the brain and MRV were done which were both normal.  There is no evidence of venous sinus thrombosis. I do not see mention of venous sinus stenosis either. Patient was referred to ophthalmology and had eye exam.  I received a phone call from the ophthalmologist on 9/9/2021 reporting that patient had some mild papilledema. Patient went back for repeat eye exam a week later with the same findings. Patient had tried Imitrex and Fioricet for her migraine headaches. .  When I spoke to her on 9/15/2021 neither of those were helping with her headaches and therefore I recommended Nurtec. Patient declined preventative treatment. She reports she is tried Topamax in the past with side effects. Patient was supposed to come into the office to  Nurtec samples but did not. She is also not been able to pick this up to her pharmacy yet. At the time of our last visit patient was referred back to her neurosurgeon given her headaches with recent lumbar laminectomy and concern for spinal fluid leak. She saw them on 9/10/2021. At that time there were no further recommendations given. Patient presents today alert and oriented x3, no acute distress, cooperative. She reports that she still having ongoing daily headaches. She reports significant associated nausea and vomiting and photophobia. No focal neuro deficits. Patient reports floaters and diplopia at times. No visual field deficits or vision loss. No nystagmus. Extraocular eye movements are intact. No disconjugate gaze. Patient reports also that she still has ongoing paresthesias to the bilateral upper and lower extremities. Paresthesias present in the entire extremities. No pain or weakness. Reflexes intact. No radiculopathy. No gait ataxia or falls. No loss of bowel or bladder. No fevers. 9/7/2021:  Pt seen and examined in the office to establish care for paresthesias and headaches. Patient is a 71-year-old  female with past medical history of asthma, ADHD, bipolar disorder. Patient with history of lumbar spine surgery at [facility] in 2006. She developed recurrent symptoms and was seen by neurosurgery at the [facility] clinic and underwent L4 laminectomy with L4/L5 discectomy on 8/5/2021. Patient reports that the day after surgery she developed bilateral lower extremity paresthesias of the entire lower extremities from the hips to the toes. I do see 2 telephone encounters from [facility]  for follow-up evaluation on 8/9/2021 and 8/13/2021 and both state that patient was doing well. I then see another follow-up phone call on 8/16/2021 where patient was requesting to return to work and I do not see mention of any paresthesias. I do see follow-up phone call on 8/25/2021 where patient reported bilateral lower extremity tingling, bilateral upper extremity tingling and excruciating headaches. MRI of the lumbar spine was ordered for 8/31/2021. Apparently patient was not able to get this done as it was denied by her insurance. She was directed to go to the urgent care at the [facility] clinic which she went to on 8/26/2021. Urgent care sent her to the emergency room for further evaluation.   Patient went to [facility] [place] emergency department on 8/27/2021. At that time they planned on transferring patient to the [facility] to be admitted under the neurosurgery service. Patient actually signed out of the emergency room AMA at that time due to social issues. Patient then presented back to [facility] emergency room on 8/28/2021. At that time she had MRI of the cervical, thoracic, lumbar spine. Results showed postoperative changes with residual/recurrent left paracentral disc protrusion/extrusion at L4-5 likely impinging on the descending left L4 nerve root in combination with epidural scarring/fibrosis contributing to moderate effacement of the left neural mendoza and contacting the exiting left L4 nerve root. Patient was discharged home with plans for follow-up with neurosurgery outpatient. She was also referred to neurology but declined to see anyone at the [facility] and presented here for further evaluation. Patient reports a lot of frustration with her surgeon as she has had difficulty coordinating care and being seen. She does have an appointment on 9/10/2021 with neurosurgery but this is a virtual visit and she is not being seen in person. Patient presents today alert and oriented x3, no acute distress, cooperative. She reports continued paresthesias in all of her extremities from the shoulders to the fingertips and the hips to the toes. She reports some occasional weakness grasping things. Denies foot drop, tripping, legs giving out or falls. Denies bowel or bladder dysfunction or saddle anesthesia. No fevers. Lower extremity reflexes are actually 2+ and intact today. There are no focal findings on her exam.  Strength is intact throughout 5 out of 5 with the exception of hand grasp which are 4 out of 5. Surgical incision looks well approximated and healed. There is no perisurgical erythema, fluctuance, induration. Patient is ambulating without difficulty.   In regards to her headaches she states that she began having a headache around August 23, 2021. She describes it as an ice pick to the left side in the temporal region that goes into the back of her neck. She rates it 10 out of 10 and describes it as constant. She reports she will only have approximately 2 hours out of the day where she does not have a headache. She states she has associated nausea, photophobia, phonophobia, and spots in her vision with headaches. She denies any history of migraine headaches with the exception of when she was on birth control which she states caused her to have similar headaches. Headaches do improve with lying down. Past Medical History:   Diagnosis Date    ADHD (attention deficit hyperactivity disorder)     Asthma     exercise induced     Past Surgical History:   Procedure Laterality Date    BACK SURGERY      2006    BACK SURGERY  08/05/2021    BREAST ENHANCEMENT SURGERY      COSMETIC SURGERY      breast implants    FINGER SURGERY      age 3    LIPOSUCTION       Social History     Socioeconomic History    Marital status: Single     Spouse name: Not on file    Number of children: Not on file    Years of education: Not on file    Highest education level: Not on file   Occupational History    Not on file   Tobacco Use    Smoking status: Current Every Day Smoker     Packs/day: 0.50     Years: 19.00     Pack years: 9.50    Smokeless tobacco: Never Used    Tobacco comment: started age 13   Vaping Use    Vaping Use: Never used   Substance and Sexual Activity    Alcohol use:  Yes     Alcohol/week: 0.0 standard drinks     Comment: ocassionally    Drug use: No    Sexual activity: Never   Other Topics Concern    Not on file   Social History Narrative    Not on file     Social Determinants of Health     Financial Resource Strain: Medium Risk    Difficulty of Paying Living Expenses: Somewhat hard   Food Insecurity: No Food Insecurity    Worried About Running Out of Food in the Last Year: Never true    Ran Out of Food in the Last Year: Never true   Transportation Needs: No Transportation Needs    Lack of Transportation (Medical): No    Lack of Transportation (Non-Medical): No   Physical Activity:     Days of Exercise per Week:     Minutes of Exercise per Session:    Stress:     Feeling of Stress :    Social Connections:     Frequency of Communication with Friends and Family:     Frequency of Social Gatherings with Friends and Family:     Attends Sikh Services:     Active Member of Clubs or Organizations:     Attends Club or Organization Meetings:     Marital Status:    Intimate Partner Violence:     Fear of Current or Ex-Partner:     Emotionally Abused:     Physically Abused:     Sexually Abused:      Family History   Problem Relation Age of Onset    Hypertension Mother     No Known Problems Father     Breast Cancer Neg Hx     Cancer Neg Hx     Colon Cancer Neg Hx     Diabetes Neg Hx     Eclampsia Neg Hx     Ovarian Cancer Neg Hx      Labor Neg Hx     Spont Abortions Neg Hx     Stroke Neg Hx      Allergies   Allergen Reactions    Amoxicillin Hives    Anesthetics, Halogenated      Hard to come out of it, hyperventilating, convulsion, nonresponsive    Oxycodone Nausea Only    Oxycodone-Acetaminophen Hives and Nausea And Vomiting    Topiramate Rash     Current Outpatient Medications on File Prior to Visit   Medication Sig Dispense Refill    esomeprazole (NEXIUM) 20 MG delayed release capsule Take 1 capsule by mouth 2 times daily 60 capsule 3    cyclobenzaprine (FLEXERIL) 10 MG tablet Take 10 mg by mouth 2 times daily      SUMAtriptan (IMITREX) 50 MG tablet Take 1 tablet by mouth once as needed for Migraine 9 tablet 3    butalbital-acetaminophen-caffeine (FIORICET, ESGIC) -40 MG per tablet Take 1 tablet by mouth every 8 hours as needed for Headaches 21 tablet 3     No current facility-administered medications on file prior to visit. Review of Systems   Constitutional: Negative for activity change, appetite change, chills, fatigue, fever and unexpected weight change. HENT: Negative for hearing loss, tinnitus and trouble swallowing. Eyes: Positive for photophobia and visual disturbance. Respiratory: Negative for cough, chest tightness, shortness of breath and wheezing. Cardiovascular: Negative for chest pain, palpitations and leg swelling. Gastrointestinal: Positive for nausea and vomiting. Negative for abdominal distention and abdominal pain. Musculoskeletal: Positive for back pain. Negative for arthralgias, gait problem, neck pain and neck stiffness. Skin: Negative for color change and rash. Neurological: Positive for headaches. Negative for dizziness, tremors, seizures, syncope, facial asymmetry, speech difficulty, weakness, light-headedness and numbness. Psychiatric/Behavioral: Negative for agitation, confusion and hallucinations. The patient is not nervous/anxious. Objective:   /72 (Site: Left Upper Arm, Position: Sitting, Cuff Size: Medium Adult)   Pulse 81   Wt 161 lb (73 kg)   BMI 29.45 kg/m²     Physical Exam  Vitals reviewed. Constitutional:       General: She is not in acute distress. Appearance: She is not diaphoretic. HENT:      Head: Normocephalic and atraumatic. Eyes:      General: No visual field deficit. Extraocular Movements: Extraocular movements intact. Pupils: Pupils are equal, round, and reactive to light. Cardiovascular:      Rate and Rhythm: Normal rate and regular rhythm. Pulmonary:      Effort: Pulmonary effort is normal. No respiratory distress. Breath sounds: Normal breath sounds. Abdominal:      General: Bowel sounds are normal. There is no distension. Palpations: Abdomen is soft. Tenderness: There is no abdominal tenderness. Musculoskeletal:      Cervical back: Normal range of motion and neck supple. No rigidity or tenderness. Lymphadenopathy:      Cervical: No cervical adenopathy. Skin:     General: Skin is warm and dry. Neurological:      Mental Status: She is alert and oriented to person, place, and time. Cranial Nerves: No cranial nerve deficit, dysarthria or facial asymmetry. Motor: No weakness, tremor, atrophy, abnormal muscle tone, seizure activity or pronator drift. Coordination: Coordination normal. Finger-Nose-Finger Test normal.      Gait: Gait normal.      Deep Tendon Reflexes: Reflexes normal.      Reflex Scores:       Patellar reflexes are 2+ on the right side and 2+ on the left side. Achilles reflexes are 2+ on the right side and 2+ on the left side. MRV HEAD WO CONTRAST    Result Date: 9/15/2021  EXAMINATION: MRI BRAIN WO CONTRAST, MRV HEAD WO CONTRAST CLINICAL HISTORY: G44.52 New daily persistent headache ICD10 COMPARISONS: NONE AVAILABLE TECHNIQUE: Multiplanar multisequence images of the brain were obtained without contrast. Diffusion perfusion imaging was obtained. FINDINGS:  There are no extra-axial collections. There is no evidence of hemorrhage. There are no areas of perfusion diffusion signal abnormality to suggest ischemia. The susceptibility images do not demonstrate evidence of hemosiderin deposition within the brain parenchyma or the leptomeninges. There is preservation of the gray-white matter differentiation. There are no areas of signal abnormality within the brain parenchyma or the posterior fossa to suggest lesion. The sulci and ventricles are within normal limits without evidence of hydrocephalus. The midline structures are intact, the corpus callosum is within normal limits. The region of the pineal gland and the sella turcica are unremarkable. There are no space-occupying lesions in the posterior fossa. The basilar cisterns are patent. The craniocervical junction is unremarkable.  There is normal flow without evidence of opacification more thrombus in the superior sagittal sinus, straight sinus, the transverse sinuses and sigmoid sinuses. The visualized portions of the orbits are within normal limits, the globes are intact. There are no intra or extraconal lesions. The optic nerves are symmetric and appear within normal limits. The visualized portions of the paranasal sinuses are within normal limits. The calvarium and soft tissues are unremarkable. There are no acute intracranial changes, no evidence of ischemia or hemorrhage. There are no regions of signal abnormality. There is no evidence of venous sinus thrombosis. MRI BRAIN WO CONTRAST    Result Date: 9/15/2021  EXAMINATION: MRI BRAIN WO CONTRAST, MRV HEAD WO CONTRAST CLINICAL HISTORY: G44.52 New daily persistent headache ICD10 COMPARISONS: NONE AVAILABLE TECHNIQUE: Multiplanar multisequence images of the brain were obtained without contrast. Diffusion perfusion imaging was obtained. FINDINGS:  There are no extra-axial collections. There is no evidence of hemorrhage. There are no areas of perfusion diffusion signal abnormality to suggest ischemia. The susceptibility images do not demonstrate evidence of hemosiderin deposition within the brain parenchyma or the leptomeninges. There is preservation of the gray-white matter differentiation. There are no areas of signal abnormality within the brain parenchyma or the posterior fossa to suggest lesion. The sulci and ventricles are within normal limits without evidence of hydrocephalus. The midline structures are intact, the corpus callosum is within normal limits. The region of the pineal gland and the sella turcica are unremarkable. There are no space-occupying lesions in the posterior fossa. The basilar cisterns are patent. The craniocervical junction is unremarkable. There is normal flow without evidence of opacification more thrombus in the superior sagittal sinus, straight sinus, the transverse sinuses and sigmoid sinuses.  The visualized portions of the orbits are within normal limits, the globes are intact. There are no intra or extraconal lesions. The optic nerves are symmetric and appear within normal limits. The visualized portions of the paranasal sinuses are within normal limits. The calvarium and soft tissues are unremarkable. There are no acute intracranial changes, no evidence of ischemia or hemorrhage. There are no regions of signal abnormality. There is no evidence of venous sinus thrombosis. Lab Results   Component Value Date    WBC 6.8 08/27/2021    RBC 4.38 08/27/2021    HGB 13.5 08/27/2021    HCT 39.4 08/27/2021    MCV 89.9 08/27/2021    MCH 30.7 08/27/2021    MCHC 34.2 08/27/2021    RDW 13.8 08/27/2021     08/27/2021     Lab Results   Component Value Date     08/27/2021    K 4.6 08/27/2021     08/27/2021    CO2 25 08/27/2021    BUN 8 08/27/2021    CREATININE 0.54 08/27/2021    GFRAA >60.0 08/27/2021    LABGLOM >60.0 08/27/2021    GLUCOSE 97 08/27/2021    PROT 7.0 12/11/2015    LABALBU 4.8 12/11/2015    CALCIUM 9.8 08/27/2021    BILITOT 0.1 12/11/2015    ALKPHOS 56 12/11/2015    AST 15 12/11/2015    ALT 11 12/11/2015     No results found for: PROTIME, INR  No results found for: TSH, [id number], FOLATE, FERRITIN, IRON, TIBC, PTRFSAT, TSH, FREET4  No results found for: TRIG, HDL, LDLCALC, LDLDIRECT, LABVLDL  Lab Results   Component Value Date    LABAMPH Neg 03/28/2016    BARBSCNU Neg 03/28/2016    LABBENZ Neg 03/28/2016    OPIATESCREENURINE Neg 03/28/2016    PHENCYCLIDINESCREENURINE Neg 03/28/2016     No results found for: LITHIUM, DILFRTOT, VALPROATE    Assessment and Plan:      1. New daily persistent headache  - Sedimentation Rate; Future  - High Sensitivity Crp; Future  - Cheli - Aníbal Stein NP, Interventional Radiology, [place]  2. Intractable migraine with aura without status migrainosus  - Rimegepant Sulfate (NURTEC) 75 MG TBDP; Take 75 mg by mouth daily as needed (migraine)  Dispense: 8 tablet; Refill: 3  3. Papilledema  - Cheli Merrill NP, Interventional Radiology, [place]    -Patient presents today for 4-week follow-up for new daily persistent headaches that began after she had lumbar laminectomy done on 8/5/2021. Patient does have remote history of migraine headaches associated with birth control use. Migraine headaches previously resolved after discontinuation of birth control. There was initial concern for spinal leak given recent lumbar laminectomy and she was referred back to neurosurgery with no further recommendations. Patient also had eye exam with ophthalmology that revealed some mild papilledema. MRI of the brain and MRV were negative. Patient had a repeat eye exam a week later with continued findings of mild papilledema. She has some reports of floaters and diplopia occasionally. No permanent visual field deficits. There is no nystagmus, pulsatile tinnitus, disconjugate gaze. Extraocular eye movements are intact. She has tried Fioricet and Imitrex with no improvement. She declines preventative treatment. Nurtec was prescribed but she has not picked this up from the pharmacy yet. We will give her samples today and resend prescription over. We will arrange for a lumbar puncture given her papilledema to assess for opening pressure. Patient has been on Topamax in the past but did not tolerate it due to significant side effects. May need Diamox depending on lumbar puncture findings. 4. Paresthesia of upper and lower extremities of both sides  - DAVID; Future  - Electrophoresis Protein, Serum without Reflex to Immunofixation; Future  - Hemoglobin A1C; Future  - HIV Screen; Future  - LYME (B.BURGDORFERI) PCR; Future  - Protein Electrophoresis, Urine; Future  - RPR Titer; Future  - Sedimentation Rate; Future  - TSH, Reflex to T4; Future  - Vitamin B12; Future  - Comprehensive Metabolic Panel; Future  - CBC; Future  - EMG; Future  - EMG; Future  - Folate; Future    5.  S/P lumbar

## 2021-10-13 RX ORDER — RIMEGEPANT SULFATE 75 MG/75MG
75 TABLET, ORALLY DISINTEGRATING ORAL PRN
Qty: 12 TABLET | Refills: 0 | COMMUNITY
Start: 2021-10-13 | End: 2021-12-27 | Stop reason: ALTCHOICE

## 2021-10-22 ENCOUNTER — TELEPHONE (OUTPATIENT)
Dept: NEUROLOGY | Age: 36
End: 2021-10-22

## 2021-10-22 ENCOUNTER — OFFICE VISIT (OUTPATIENT)
Dept: FAMILY MEDICINE CLINIC | Age: 36
End: 2021-10-22
Payer: COMMERCIAL

## 2021-10-22 VITALS
SYSTOLIC BLOOD PRESSURE: 98 MMHG | BODY MASS INDEX: 29.74 KG/M2 | WEIGHT: 161.6 LBS | DIASTOLIC BLOOD PRESSURE: 64 MMHG | TEMPERATURE: 97.9 F | HEART RATE: 97 BPM | OXYGEN SATURATION: 97 % | HEIGHT: 62 IN

## 2021-10-22 DIAGNOSIS — R20.2 PARESTHESIA OF UPPER AND LOWER EXTREMITIES OF BOTH SIDES: ICD-10-CM

## 2021-10-22 DIAGNOSIS — H53.9 VISION CHANGES: ICD-10-CM

## 2021-10-22 DIAGNOSIS — G44.52 NEW DAILY PERSISTENT HEADACHE: ICD-10-CM

## 2021-10-22 DIAGNOSIS — G43.909 MIGRAINE WITHOUT STATUS MIGRAINOSUS, NOT INTRACTABLE, UNSPECIFIED MIGRAINE TYPE: ICD-10-CM

## 2021-10-22 DIAGNOSIS — K21.9 GASTROESOPHAGEAL REFLUX DISEASE, UNSPECIFIED WHETHER ESOPHAGITIS PRESENT: Primary | ICD-10-CM

## 2021-10-22 LAB
ALBUMIN SERPL-MCNC: 4.6 G/DL (ref 3.5–4.6)
ALP BLD-CCNC: 98 U/L (ref 40–130)
ALT SERPL-CCNC: 16 U/L (ref 0–33)
ANION GAP SERPL CALCULATED.3IONS-SCNC: 13 MEQ/L (ref 9–15)
AST SERPL-CCNC: 16 U/L (ref 0–35)
BILIRUB SERPL-MCNC: <0.2 MG/DL (ref 0.2–0.7)
BUN BLDV-MCNC: 9 MG/DL (ref 6–20)
C-REACTIVE PROTEIN, HIGH SENSITIVITY: 0.9 MG/L (ref 0–5)
CALCIUM SERPL-MCNC: 9.7 MG/DL (ref 8.5–9.9)
CHLORIDE BLD-SCNC: 102 MEQ/L (ref 95–107)
CO2: 23 MEQ/L (ref 20–31)
CREAT SERPL-MCNC: 0.57 MG/DL (ref 0.5–0.9)
FOLATE: 11 NG/ML (ref 7.3–26.1)
GFR AFRICAN AMERICAN: >60
GFR NON-AFRICAN AMERICAN: >60
GLOBULIN: 2.3 G/DL (ref 2.3–3.5)
GLUCOSE BLD-MCNC: 90 MG/DL (ref 70–99)
HBA1C MFR BLD: 5 % (ref 4.8–5.9)
HCT VFR BLD CALC: 38.6 % (ref 37–47)
HEMOGLOBIN: 13 G/DL (ref 12–16)
MCH RBC QN AUTO: 30.3 PG (ref 27–31.3)
MCHC RBC AUTO-ENTMCNC: 33.7 % (ref 33–37)
MCV RBC AUTO: 89.8 FL (ref 82–100)
PDW BLD-RTO: 14.2 % (ref 11.5–14.5)
PLATELET # BLD: 409 K/UL (ref 130–400)
POTASSIUM SERPL-SCNC: 4.4 MEQ/L (ref 3.4–4.9)
RBC # BLD: 4.3 M/UL (ref 4.2–5.4)
SEDIMENTATION RATE, ERYTHROCYTE: 8 MM (ref 0–20)
SODIUM BLD-SCNC: 138 MEQ/L (ref 135–144)
TOTAL PROTEIN: 6.9 G/DL (ref 6.3–8)
TSH REFLEX: 1.49 UIU/ML (ref 0.44–3.86)
VITAMIN B-12: 725 PG/ML (ref 232–1245)
WBC # BLD: 6.4 K/UL (ref 4.8–10.8)

## 2021-10-22 PROCEDURE — G8427 DOCREV CUR MEDS BY ELIG CLIN: HCPCS | Performed by: STUDENT IN AN ORGANIZED HEALTH CARE EDUCATION/TRAINING PROGRAM

## 2021-10-22 PROCEDURE — G8482 FLU IMMUNIZE ORDER/ADMIN: HCPCS | Performed by: STUDENT IN AN ORGANIZED HEALTH CARE EDUCATION/TRAINING PROGRAM

## 2021-10-22 PROCEDURE — 99214 OFFICE O/P EST MOD 30 MIN: CPT | Performed by: STUDENT IN AN ORGANIZED HEALTH CARE EDUCATION/TRAINING PROGRAM

## 2021-10-22 PROCEDURE — 4004F PT TOBACCO SCREEN RCVD TLK: CPT | Performed by: STUDENT IN AN ORGANIZED HEALTH CARE EDUCATION/TRAINING PROGRAM

## 2021-10-22 PROCEDURE — G8419 CALC BMI OUT NRM PARAM NOF/U: HCPCS | Performed by: STUDENT IN AN ORGANIZED HEALTH CARE EDUCATION/TRAINING PROGRAM

## 2021-10-22 ASSESSMENT — ENCOUNTER SYMPTOMS
WHEEZING: 0
EYE DISCHARGE: 0
SHORTNESS OF BREATH: 0
ABDOMINAL PAIN: 0
DIARRHEA: 0
RHINORRHEA: 0
BACK PAIN: 1
COUGH: 0
VOMITING: 0
SORE THROAT: 0
NAUSEA: 0

## 2021-10-22 NOTE — PROGRESS NOTES
Subjective  Saman Buddy, 29 y.o. female presents today with:  Chief Complaint   Patient presents with    1 Month Follow-Up     Is following with neurologist. Did have medication changed & states she feels like she has been hit by a truck since medication was changed.  Numbness    Migraine    Blurred Vision    Other     States she has been difficulty speaking lately. States she feels like she just can't get the words out & when she is able to get them to come out the words are jumbled & not understandable. HPI    Patient here for 6-week follow-up for multiple issues. She did establish with neurology. She did have multiple medication changes since seeing them. She is scheduled for consultation for possible spinal tap in about 2 weeks. She states that she has had some extreme fatigue since starting the Nurtec. She denies any issues with her mood. She denies any depressive symptoms. She is nervous and anxious given all of her symptoms. She states that neurology has been trying to manage her migraines with the Nurtec. She is waiting on prior authorization. She continues to have numbness, blurred vision. She has also had some difficulty with speech lately she states she has difficulty with word finding. She is following with neurology locally. She states she also had an appointment with her surgeon's PA and they did not think any of her symptoms were related to her surgery. She is going to get blood work today from neurology. Patient also following up for GERD. She states that she was unable to get the Nexium after her last appointment. She does request a refill today. She states that her GERD has been not under the best control since she has not had the Nexium. She denies any hematemesis, nausea or vomiting. No blood in her stool. She has no other concerns at this time. Review of Systems   Constitutional: Negative for chills, fatigue, fever and unexpected weight change. HENT: Negative for congestion, rhinorrhea and sore throat. Eyes: Positive for visual disturbance. Negative for discharge. Respiratory: Negative for cough, shortness of breath and wheezing. Cardiovascular: Negative for chest pain, palpitations and leg swelling. Gastrointestinal: Negative for abdominal pain, diarrhea, nausea and vomiting. Genitourinary: Negative for difficulty urinating. No loss of bowel or bladder function. No saddle anesthesia. Musculoskeletal: Positive for back pain. Negative for arthralgias and joint swelling. Skin: Negative for rash. Neurological: Positive for headaches. Negative for weakness, light-headedness and numbness. Psychiatric/Behavioral: Negative for confusion. The patient is nervous/anxious. Past Medical History:   Diagnosis Date    ADHD (attention deficit hyperactivity disorder)     Asthma     exercise induced     Past Surgical History:   Procedure Laterality Date    BACK SURGERY      2006    BACK SURGERY  08/05/2021    BREAST ENHANCEMENT SURGERY      COSMETIC SURGERY      breast implants    FINGER SURGERY      age 3    LIPOSUCTION       Current Outpatient Medications   Medication Sig Dispense Refill    esomeprazole (NEXIUM) 20 MG delayed release capsule Take 1 capsule by mouth 2 times daily 60 capsule 3    Rimegepant Sulfate (NURTEC) 75 MG TBDP Take 75 mg by mouth as needed (dissolve under tongue at onset of migraine) 6 SAMPLES GIVEN  LOT: [id number]  EXP: 10/22 12 tablet 0    cyclobenzaprine (FLEXERIL) 10 MG tablet Take 10 mg by mouth 2 times daily      Rimegepant Sulfate (NURTEC) 75 MG TBDP Take 75 mg by mouth daily as needed (migraine) 8 tablet 3     No current facility-administered medications for this visit. PMH, Surgical Hx, Family Hx, and Social Hx reviewed and updated. Health Maintenance reviewed.     Objective    Vitals:    10/22/21 1128   BP: 98/64   Pulse: 97   Temp: 97.9 °F (36.6 °C)   SpO2: 97%   Weight: 161 lb 9.6 oz (73.3 kg)   Height: 5' 2\" (1.575 m)       Physical Exam  Vitals reviewed. Constitutional:       General: She is not in acute distress. HENT:      Head: Normocephalic and atraumatic. Eyes:      Conjunctiva/sclera: Conjunctivae normal.   Neck:      Thyroid: No thyromegaly or thyroid tenderness. Cardiovascular:      Rate and Rhythm: Normal rate and regular rhythm. Heart sounds: No murmur heard. No friction rub. No gallop. Pulmonary:      Effort: Pulmonary effort is normal.      Breath sounds: Normal breath sounds. No wheezing, rhonchi or rales. Abdominal:      General: Bowel sounds are normal. There is no distension. Palpations: Abdomen is soft. Tenderness: There is no abdominal tenderness. Musculoskeletal:         General: No swelling or deformity. Cervical back: Normal range of motion and neck supple. Right lower leg: No edema. Left lower leg: No edema. Lymphadenopathy:      Cervical: No cervical adenopathy. Skin:     General: Skin is warm and dry. Findings: No rash. Neurological:      General: No focal deficit present. Mental Status: She is alert. Motor: No weakness. Gait: Gait is intact. Gait normal.      Deep Tendon Reflexes: Reflexes normal.   Psychiatric:         Mood and Affect: Mood normal.         Behavior: Behavior normal.        Assessment & Plan     1. Gastroesophageal reflux disease, unspecified whether esophagitis present  Uncontrolled currently. Nexium represcribed. Patient instructed that if there is an issue with the prescription to call our office right away. Patient voiced understanding. Follow-up in 2 months. - esomeprazole (NEXIUM) 20 MG delayed release capsule; Take 1 capsule by mouth 2 times daily  Dispense: 60 capsule; Refill: 3    2. Migraine without status migrainosus, not intractable, unspecified migraine type  Following with neurology for this. She was started on Nurtec.   She is scheduled for follow-up in about 6 weeks with neurology. She is instructed that if her symptoms worsen or change, she should return to care sooner. Patient voiced understanding. All questions answered. Follow-up as scheduled. 3. Vision changes  Patient with continued vision changes due to her multitude of symptoms. She is following with neurology. She is scheduled for a lot of testing over the next several weeks. Plan for follow-up in 2 months after evaluation by neurology. No orders of the defined types were placed in this encounter. Orders Placed This Encounter   Medications    esomeprazole (NEXIUM) 20 MG delayed release capsule     Sig: Take 1 capsule by mouth 2 times daily     Dispense:  60 capsule     Refill:  3     Medications Discontinued During This Encounter   Medication Reason    butalbital-acetaminophen-caffeine (FIORICET, ESGIC) -40 MG per tablet LIST CLEANUP    SUMAtriptan (IMITREX) 50 MG tablet Therapy completed    esomeprazole (NEXIUM) 20 MG delayed release capsule REORDER     Return in about 2 months (around 12/22/2021) for follow up.    30 minutes spent talking with patient and reviewing chart. Reviewed with the patient: current clinical status,medications, activities and diet. Side effects, adverse effects of themedication prescribed today, as well as treatment plan/ rationale and result expectations have been discussed with the patient who expresses understanding and desires to proceed. Close follow up to evaluate treatment results and for coordination of care. I have reviewed the patient's medical history in detail and updated the computerized patient record. Please note, this report has been partially produced using speech recognition software and may cause  and /or contain errors related to that system including grammar, punctuation and spelling as well as words and phrases that may seem inappropriate. If there are questions or concerns please feel free to contact me to clarify.     Jessie Bauer Chin Mccabe, DO

## 2021-10-25 LAB
MISCELLANEOUS LAB TEST ORDER: NORMAL
RPR TITER: NORMAL
WHOPPER PROMPT: NORMAL

## 2021-10-26 LAB
ANA PATTERN: ABNORMAL
ANA TITER: ABNORMAL
ANTINUCLEAR AB INTERPRETIVE COMMENT: ABNORMAL
ANTINUCLEAR ANTIBODY, HEP-2, IGG: DETECTED
HIV AG/AB: NONREACTIVE

## 2021-10-27 LAB
ALBUMIN SERPL-MCNC: 4.08 G/DL (ref 3.75–5.01)
ALPHA-1-GLOBULIN: 0.29 G/DL (ref 0.19–0.46)
ALPHA-2-GLOBULIN: 0.82 G/DL (ref 0.48–1.05)
BETA GLOBULIN: 0.8 G/DL (ref 0.48–1.1)
BORRELLA SPECIES DNA DETECTION PCR: NOT DETECTED
BORRELLA SPECIES SOURCE: NORMAL
GAMMA GLOBULIN: 0.81 G/DL (ref 0.62–1.51)
PROTEIN ELECTROPHORESIS, SERUM: NORMAL
SPE/IFE INTERPRETATION: NORMAL
TOTAL PROTEIN: 6.8 G/DL (ref 6.3–8.2)

## 2021-10-29 ENCOUNTER — PATIENT MESSAGE (OUTPATIENT)
Dept: NEUROLOGY | Age: 36
End: 2021-10-29

## 2021-11-01 ENCOUNTER — INITIAL CONSULT (OUTPATIENT)
Dept: INTERVENTIONAL RADIOLOGY/VASCULAR | Age: 36
End: 2021-11-01
Payer: COMMERCIAL

## 2021-11-01 ENCOUNTER — TELEPHONE (OUTPATIENT)
Dept: INTERVENTIONAL RADIOLOGY/VASCULAR | Age: 36
End: 2021-11-01

## 2021-11-01 ENCOUNTER — TELEPHONE (OUTPATIENT)
Dept: NEUROLOGY | Age: 36
End: 2021-11-01

## 2021-11-01 VITALS
HEART RATE: 87 BPM | DIASTOLIC BLOOD PRESSURE: 62 MMHG | BODY MASS INDEX: 29.63 KG/M2 | RESPIRATION RATE: 12 BRPM | HEIGHT: 62 IN | SYSTOLIC BLOOD PRESSURE: 110 MMHG | WEIGHT: 161 LBS

## 2021-11-01 DIAGNOSIS — M54.16 LUMBAR RADICULOPATHY: ICD-10-CM

## 2021-11-01 DIAGNOSIS — R76.8 POSITIVE ANA (ANTINUCLEAR ANTIBODY): Primary | ICD-10-CM

## 2021-11-01 DIAGNOSIS — H53.9 VISION CHANGES: ICD-10-CM

## 2021-11-01 DIAGNOSIS — R20.2 PARESTHESIA OF BOTH LOWER EXTREMITIES: ICD-10-CM

## 2021-11-01 DIAGNOSIS — H47.10 PAPILLEDEMA: Primary | ICD-10-CM

## 2021-11-01 DIAGNOSIS — G44.52 NEW DAILY PERSISTENT HEADACHE: ICD-10-CM

## 2021-11-01 PROCEDURE — G8427 DOCREV CUR MEDS BY ELIG CLIN: HCPCS | Performed by: NURSE PRACTITIONER

## 2021-11-01 PROCEDURE — G8482 FLU IMMUNIZE ORDER/ADMIN: HCPCS | Performed by: NURSE PRACTITIONER

## 2021-11-01 PROCEDURE — G8419 CALC BMI OUT NRM PARAM NOF/U: HCPCS | Performed by: NURSE PRACTITIONER

## 2021-11-01 PROCEDURE — 99244 OFF/OP CNSLTJ NEW/EST MOD 40: CPT | Performed by: NURSE PRACTITIONER

## 2021-11-01 RX ORDER — RIMEGEPANT SULFATE 75 MG/75MG
75 TABLET, ORALLY DISINTEGRATING ORAL PRN
Qty: 10 TABLET | Refills: 0 | COMMUNITY
Start: 2021-11-01 | End: 2021-12-27 | Stop reason: ALTCHOICE

## 2021-11-01 ASSESSMENT — ENCOUNTER SYMPTOMS
VOMITING: 1
WHEEZING: 0
EYE PAIN: 0
EYE ITCHING: 0
EYE DISCHARGE: 0
DIARRHEA: 0
SHORTNESS OF BREATH: 0
NAUSEA: 1
BACK PAIN: 1
COUGH: 0
TROUBLE SWALLOWING: 0
RESPIRATORY NEGATIVE: 1
ABDOMINAL PAIN: 0
EYE REDNESS: 0
SORE THROAT: 0
CONSTIPATION: 0
ABDOMINAL DISTENTION: 0

## 2021-11-01 NOTE — TELEPHONE ENCOUNTER
PATIENT WAS GIVEN THIS INFORMATION PRIOR TO LEAVING THE OFFICE - VOICED UNDERSTANDING  Your procedure is schedule on 11/11/2021 at 11:00 am  >  You will need to arrive at 10:00 am from home and check in at the Diagnostic Imaging Check In desk.   > Do not eat or drink after midnight. Sips of water is acceptable ONLY to take medications but do not take blood thinners or other medications you were advised not to take.  > Make arrangements for transportation, as you should not drive immediately after.  You will have to lay back in your seat going home to decrease risk of complications  > Increase your fluid intake for 24 hours prior to procedure  > Plan on being in the hospital laying on your back for 3-4 hours before you are discharged  > Patient needs to get PT/INR 3-4 days prior to procedure

## 2021-11-01 NOTE — TELEPHONE ENCOUNTER
Patient came in office to  Nurtec and would like to know if you could please give her a call to go over her lab results from last week. Thank you.

## 2021-11-01 NOTE — PROGRESS NOTES
VASCULAR MEDICINE AND INTERVENTIONAL RADIOLOGY DEPARTMENT:     Chivo Ahn, a female of 29 y.o. came to the office 2021. Chief Complaint   Patient presents with   [facility].     Referral from TREY Aranda for papilledema       2021 HPI:Bronwyn Sterling referred by neurology for evaluation of having diagnostic lumbar puncture with opening and closing pressures for papilledema. Papilledema found 2021. S/P Lumbar Laminectomy done at [facility] 2021. Since then has developed migraines and paresthesia in both upper and lower extremities, and difficulty enunciating words. Medication treatment by neurology without relief. Denies chest pain. Denies dyspnea. Family History   Problem Relation Age of Onset    Hypertension Mother     No Known Problems Father     Breast Cancer Neg Hx     Cancer Neg Hx     Colon Cancer Neg Hx     Diabetes Neg Hx     Eclampsia Neg Hx     Ovarian Cancer Neg Hx      Labor Neg Hx     Spont Abortions Neg Hx     Stroke Neg Hx        Past Surgical History:   Procedure Laterality Date    BACK SURGERY          BACK SURGERY  2021    BREAST ENHANCEMENT SURGERY      COSMETIC SURGERY      breast implants    FINGER SURGERY      age 3    LIPOSUCTION          Past Medical History:   Diagnosis Date    ADHD (attention deficit hyperactivity disorder)     Asthma     exercise induced       Social History     Socioeconomic History    Marital status: Single     Spouse name: None    Number of children: None    Years of education: None    Highest education level: None   Occupational History    None   Tobacco Use    Smoking status: Current Every Day Smoker     Packs/day: 0.50     Years: 19.00     Pack years: 9.50    Smokeless tobacco: Never Used    Tobacco comment: started age 13   Vaping Use    Vaping Use: Never used   Substance and Sexual Activity    Alcohol use:  Yes     Alcohol/week: 0.0 standard drinks     Comment: ocassionally    Drug use: No    Sexual activity: Never   Other Topics Concern    None   Social History Narrative    None     Social Determinants of Health     Financial Resource Strain: Medium Risk    Difficulty of Paying Living Expenses: Somewhat hard   Food Insecurity: No Food Insecurity    Worried About Running Out of Food in the Last Year: Never true    Phill of Food in the Last Year: Never true   Transportation Needs: No Transportation Needs    Lack of Transportation (Medical): No    Lack of Transportation (Non-Medical): No   Physical Activity:     Days of Exercise per Week:     Minutes of Exercise per Session:    Stress:     Feeling of Stress :    Social Connections:     Frequency of Communication with Friends and Family:     Frequency of Social Gatherings with Friends and Family:     Attends [employer] Services:     Active Member of Clubs or Organizations:     Attends Club or Organization Meetings:     Marital Status:    Intimate Partner Violence:     Fear of Current or Ex-Partner:     Emotionally Abused:     Physically Abused:     Sexually Abused:         Allergies   Allergen Reactions    Amoxicillin Hives    Anesthetics, Halogenated      Hard to come out of it, hyperventilating, convulsion, nonresponsive    Oxycodone Nausea Only    Oxycodone-Acetaminophen Hives and Nausea And Vomiting    Topiramate Rash       Current Outpatient Medications on File Prior to Visit   Medication Sig Dispense Refill    esomeprazole (NEXIUM) 20 MG delayed release capsule Take 1 capsule by mouth 2 times daily 60 capsule 3    Rimegepant Sulfate (NURTEC) 75 MG TBDP Take 75 mg by mouth as needed (dissolve under tongue at onset of migraine) 6 SAMPLES GIVEN  LOT: [id number]  EXP: 10/22 12 tablet 0    Rimegepant Sulfate (NURTEC) 75 MG TBDP Take 75 mg by mouth daily as needed (migraine) 8 tablet 3    cyclobenzaprine (FLEXERIL) 10 MG tablet Take 10 mg by mouth 2 times daily       No current facility-administered medications on file prior to visit. Review of Systems   Constitutional: Negative. Negative for activity change, appetite change, chills, fatigue and fever. HENT: Negative. Negative for congestion, sore throat and trouble swallowing. Eyes: Positive for visual disturbance (diplopia,  and dry eye). Negative for pain, discharge, redness and itching. Respiratory: Negative. Negative for cough, shortness of breath and wheezing. Cardiovascular: Negative for chest pain and palpitations (bilateral lower distal legs x 3 months ). Gastrointestinal: Positive for nausea (with migraines when severe) and vomiting (with migraines when severe). Negative for abdominal distention, abdominal pain, constipation and diarrhea. Endocrine: Negative. Genitourinary: Negative. Negative for difficulty urinating, dysuria and hematuria. Musculoskeletal: Positive for back pain, neck pain and neck stiffness. Negative for arthralgias and gait problem. Skin: Negative. Neurological: Positive for dizziness, light-headedness, numbness (hands, feet, legs, arms) and headaches. Hematological: Bruises/bleeds easily. Psychiatric/Behavioral: Negative. OBJECTIVE:  /62   Pulse 87   Resp 12   Ht 5' 2\" (1.575 m)   Wt 161 lb (73 kg)   BMI 29.45 kg/m²     Physical Exam  Constitutional:       General: She is not in acute distress. Appearance: Normal appearance. She is not ill-appearing. HENT:      Head: Normocephalic and atraumatic. Nose: No congestion. Cardiovascular:      Rate and Rhythm: Normal rate. Heart sounds: Normal heart sounds. Pulmonary:      Effort: Pulmonary effort is normal.   Abdominal:      Palpations: Abdomen is soft. Musculoskeletal:         General: Normal range of motion. Cervical back: Normal range of motion. Right lower leg: No edema. Left lower leg: No edema. Skin:     General: Skin is warm and dry.       Capillary Refill: Capillary refill takes 2 to 3 seconds. Neurological:      Mental Status: She is alert and oriented to person, place, and time. Psychiatric:         Mood and Affect: Mood normal.         Behavior: Behavior normal.         ASSESSMENT AND PLAN:  Chart, medications, lab work reviewed. Diagnosis Orders   1. Paresthesia of both lower extremities  IR LUMBAR PUNCTURE FOR DIAGNOSIS    Protime-Inr   2. Papilledema  IR LUMBAR PUNCTURE FOR DIAGNOSIS    Protime-Inr   3. Vision changes  IR LUMBAR PUNCTURE FOR DIAGNOSIS    Protime-Inr   4. Lumbar radiculopathy  IR LUMBAR PUNCTURE FOR DIAGNOSIS    Protime-Inr   5. New daily persistent headache  IR LUMBAR PUNCTURE FOR DIAGNOSIS    Protime-Inr          Plan:     Orders Placed This Encounter   Procedures    IR LUMBAR PUNCTURE FOR DIAGNOSIS     Standing Status:   Future     Standing Expiration Date:   11/1/2022     Order Specific Question:   Reason for exam:     Answer:   with opening and closing pressures    Protime-Inr     Standing Status:   Future     Standing Expiration Date:   11/1/2022     Order Specific Question:   Daily Coumadin Dose? Answer:   . ............... No orders of the defined types were placed in this encounter. --  Fluoroscopy guided Lumbar Puncture with opening and closing pressure. Procedure with risks including infection, bleeding, Lower extremity weakness and/or numbness, spinal headache, pain at site all discussed with patient. Patient wishes to proceed. --  INR prior to  --  Follow up with referring Dr. Manolo Hebert CNP for pathology results. No further follow up care required in IR after biopsy completed. Thank You  (Neurology) LEELA Hebert CNP for referral of your patient to our practice for care.      PAKRER Arreaga - TREY

## 2021-11-01 NOTE — TELEPHONE ENCOUNTER
Called and spoke with patient regarding positive DAVID. Will put order in for her anti-DNA antibody, anti-SSA and SSB, rheumatoid factor. Referral placed to rheumatology.

## 2021-11-01 NOTE — TELEPHONE ENCOUNTER
From: Lottie Madden  To: Cinda Mcpherson, APRN - CNP  Sent: 10/29/2021 4:27 PM EDT  Subject: Test Results Question    Hi,  I called the office yesterday and was told you would give me a call today to go over the test results. Please call me.  Thanks

## 2021-11-02 ENCOUNTER — TELEPHONE (OUTPATIENT)
Dept: NEUROLOGY | Age: 36
End: 2021-11-02

## 2021-11-02 RX ORDER — RIZATRIPTAN BENZOATE 5 MG/1
5 TABLET ORAL
Qty: 9 TABLET | Refills: 5 | Status: SHIPPED | OUTPATIENT
Start: 2021-11-02 | End: 2022-09-30 | Stop reason: ALTCHOICE

## 2021-11-02 NOTE — TELEPHONE ENCOUNTER
Pts insurance denied [facility] and states that the patient has to try/have contraindication to the following medications:     Naratriptan, rizatriptan, rizatriptan ODT sumatriptan (tablets, nasal spray, or injectioin)

## 2021-11-02 NOTE — TELEPHONE ENCOUNTER
Patient has already tried sumatriptan with no improvement. We will send prescription for Maxalt over.

## 2021-11-05 ENCOUNTER — TELEPHONE (OUTPATIENT)
Dept: NEUROLOGY | Age: 36
End: 2021-11-05

## 2021-11-08 DIAGNOSIS — R76.8 POSITIVE ANA (ANTINUCLEAR ANTIBODY): ICD-10-CM

## 2021-11-08 DIAGNOSIS — G44.52 NEW DAILY PERSISTENT HEADACHE: ICD-10-CM

## 2021-11-08 DIAGNOSIS — R20.2 PARESTHESIA OF BOTH LOWER EXTREMITIES: ICD-10-CM

## 2021-11-08 DIAGNOSIS — H47.10 PAPILLEDEMA: ICD-10-CM

## 2021-11-08 DIAGNOSIS — M54.16 LUMBAR RADICULOPATHY: ICD-10-CM

## 2021-11-08 DIAGNOSIS — H53.9 VISION CHANGES: ICD-10-CM

## 2021-11-08 DIAGNOSIS — R20.2 PARESTHESIA OF UPPER AND LOWER EXTREMITIES OF BOTH SIDES: ICD-10-CM

## 2021-11-08 LAB
INR BLD: 1.1
PROTHROMBIN TIME: 14 SEC (ref 12.3–14.9)
RHEUMATOID FACTOR: <10 IU/ML (ref 0–14)
SEDIMENTATION RATE, ERYTHROCYTE: 10 MM (ref 0–20)

## 2021-11-11 ENCOUNTER — HOSPITAL ENCOUNTER (OUTPATIENT)
Dept: INTERVENTIONAL RADIOLOGY/VASCULAR | Age: 36
Discharge: HOME | End: 2021-11-13
Payer: COMMERCIAL

## 2021-11-11 VITALS
SYSTOLIC BLOOD PRESSURE: 103 MMHG | DIASTOLIC BLOOD PRESSURE: 57 MMHG | HEART RATE: 84 BPM | RESPIRATION RATE: 12 BRPM | OXYGEN SATURATION: 98 %

## 2021-11-11 DIAGNOSIS — R20.2 PARESTHESIA OF BOTH LOWER EXTREMITIES: ICD-10-CM

## 2021-11-11 DIAGNOSIS — H47.10 PAPILLEDEMA: ICD-10-CM

## 2021-11-11 DIAGNOSIS — H53.9 VISION CHANGES: ICD-10-CM

## 2021-11-11 DIAGNOSIS — G44.52 NEW DAILY PERSISTENT HEADACHE: ICD-10-CM

## 2021-11-11 DIAGNOSIS — M54.16 LUMBAR RADICULOPATHY: ICD-10-CM

## 2021-11-11 LAB — DOUBLE STRANDED DNA AB, IGG: 1 IU (ref 0–24)

## 2021-11-11 PROCEDURE — 62328 DX LMBR SPI PNXR W/FLUOR/CT: CPT | Performed by: RADIOLOGY

## 2021-11-11 PROCEDURE — 2500000003 HC RX 250 WO HCPCS: Performed by: RADIOLOGY

## 2021-11-11 PROCEDURE — 2709999900 IR LUMBAR PUNCTURE FOR DIAGNOSIS

## 2021-11-11 RX ORDER — LIDOCAINE HYDROCHLORIDE 20 MG/ML
INJECTION, SOLUTION INFILTRATION; PERINEURAL
Status: COMPLETED | OUTPATIENT
Start: 2021-11-11 | End: 2021-11-11

## 2021-11-11 RX ADMIN — LIDOCAINE HYDROCHLORIDE 2 ML: 20 INJECTION, SOLUTION INFILTRATION; PERINEURAL at 11:34

## 2021-11-11 ASSESSMENT — PAIN DESCRIPTION - LOCATION: LOCATION: HEAD

## 2021-11-11 ASSESSMENT — PAIN SCALES - GENERAL: PAINLEVEL_OUTOF10: 10

## 2021-11-11 NOTE — SEDATION DOCUMENTATION
NO SEDATION - FLUOROSCOPY GUIDED LUMBAR PUNCTURE WITH OPENING AND CLOSING PRESSURES    1113 Lumbar Puncture procedure explained. Consent confirmed. VSS. Pt reported some discomfort at IV site to [facility], site checked, no redness or edema noted. IV aspirated and flushed / brisk blood return, no complications noted. 1115 Patient positioned on L side on IR table with pillows for support. Imaging done by IR tech using fluoroscopy to visualize lumbar spine. 1 Mikie Patton in speaking with patient, answering all questions, and explaining procedure. Time out completed, site marked by Dr. Quentin Patton, then procedure site prepped with Betadine and draped with sterile drape and towels. 1135 Lumbar spine site then numbed with lidocaine 2% by Dr Quentin Patton, spinal needle placed into the central canal of the spinal cord using fluoroscopic guidance. Fluid draining appears clear and colorless. 1140 Using a measuring device, Dr. Quentin Patton determines opening pressure is 18 cmH20.     1141 Fluid obtained and placed into 4 separate specimen tubes. Each tube labeled with one through four. Total CSF removed 27 ml. Closing pressure is 6 cmH20. No labs ordered. 1148 Band-aid applied. Patient tolerated well and assisted onto cart for recovery period. Post procedure instructions telling patient to keep the head of bed no higher than Patient taken to CT holding for further monitoring. Pt instructed to keep HOB less than 20 degrees for next 4 hours. Dr. Quentin Patton states patient may leave at 1500 to collect her child from school. Pt will not be driving and is instructed to lay at flat as possible until the 4 hr miguel is up.

## 2021-11-11 NOTE — FLOWSHEET NOTE
1030 - Patient arrived to CT holding room, scheduled today for Fluoroscopy guided lumbar puncture with opening and closing pressures with Dr. Rosana Escobar. Alert, oriented x4. Ambulating independently with no issues. Dressed in gown and settled on cart. Attached to VS monitor and telemetry. Pt reports pain - headache 10/10 which has been a constant issue for her. 1050 - #20g IV inserted into pt's RFA and home med list reviewed. [id number] - Consent reviewed with patient and signed. Questions answered and emotional support given. 1057 - Pt transporting to specials via cart, will stop to use restroom beforehand and will obtain urine sample for poc pregnancy test.     1108 - Pregnancy test negative.     Electronically signed by Nelly Quintanilla RN on 11/11/2021 at 10:58 AM

## 2021-11-11 NOTE — PROGRESS NOTES
Pt's VSS. Pt denies states feeling pressure pain in her head that has improved since the LP. Pt rates headache 7/10 and states \"pain is much better. \" Pt able to move all extremities and states she does not have any new numbness or tingling to extremities since the LP. Band-aide to back is clean, dry, and intact. Pt able to urinate. Pt eating and drinking and is with out compliants.

## 2021-11-11 NOTE — FLOWSHEET NOTE
Pt brought back to CT HR via cart. States her headache has subsided and her vision is not blurry. VSS  Sipping water. Band aid intact to LP site. Call light is in reach. Pt watching TV on her tablet at this time. 1300 IV removed per pt request.   Informed pt it will need to be placed again if she encounters any issues, verbalized understanding. Lunch tray given. No new complaints. 1430 pt continues to rest. Band aid intact, no bleeding noted. Denies any headache or blurry vision at this time. D/c instructions given to pt with verbalized understanding. Encourage pt to dink plenty of water today, rest, and lay around for the rest of the day.

## 2021-11-11 NOTE — FLOWSHEET NOTE
Patient getting dressed independently. Patient denies any pain right now, large bandaid remains in place on lower back. Bandaid is clean, dry, and intact. Site soft. No drainage, bleeding, or hematoma noted. Patient taken out for discharge via wheelchair. Patient's mom to pick her up to drive her home.

## 2021-11-12 ENCOUNTER — POST-OP TELEPHONE (OUTPATIENT)
Dept: INTERVENTIONAL RADIOLOGY/VASCULAR | Age: 36
End: 2021-11-12

## 2021-11-12 LAB
ENA TO SSB (LA) ANTIBODY: 12 AU/ML (ref 0–40)
SSA 52 (RO) (ENA) AB, IGG: 6 AU/ML (ref 0–40)

## 2021-11-12 NOTE — PROGRESS NOTES
At 1119 Spoke to pt to follow up after yesterday's LP. Pt states she is no longer experiencing pressure to her eyes. Headache is a lot better and just recently came on more strongly about an hour ago, but the intensity is less. Site is sore. No leaking, redness or signs of infections to the site per pt. Pt encouraged to call for any concerns regarding LP.

## 2021-11-24 ENCOUNTER — HOSPITAL ENCOUNTER (OUTPATIENT)
Dept: NEUROLOGY | Age: 36
Discharge: HOME | End: 2021-11-24
Payer: COMMERCIAL

## 2021-11-24 DIAGNOSIS — R20.2 PARESTHESIA OF UPPER AND LOWER EXTREMITIES OF BOTH SIDES: ICD-10-CM

## 2021-11-24 PROCEDURE — 95913 NRV CNDJ TEST 13/> STUDIES: CPT | Performed by: PSYCHIATRY & NEUROLOGY

## 2021-11-24 PROCEDURE — 95911 NRV CNDJ TEST 9-10 STUDIES: CPT

## 2021-11-24 PROCEDURE — 95886 MUSC TEST DONE W/N TEST COMP: CPT | Performed by: PSYCHIATRY & NEUROLOGY

## 2021-11-24 PROCEDURE — 95886 MUSC TEST DONE W/N TEST COMP: CPT

## 2021-11-24 NOTE — PROCEDURES
[address]                      [facility], [address] [facility]                             ELECTROMYOGRAM REPORT    PATIENT NAME: Laurie Sales                   :        1985  MED REC NO:   [id number]                            ROOM:  ACCOUNT NO:   [de-identified]                           ADMIT DATE: 2021  PROVIDER:     Landon Klein MD    DATE OF EM2021    REASON FOR STUDY:  Neurophysiological studies are requested for numbness  in upper extremities bilaterally. FINDINGS:  MOTOR NERVE CONDUCTION STUDIES:  Right median nerve motor nerve  conduction study shows normal amplitudes, latency, and conduction  velocity. Left median nerve motor nerve conduction study shows normal  amplitudes, latency, and conduction velocity. Right ulnar nerve motor  nerve conduction study shows normal amplitudes, latency, and conduction  velocity. Left ulnar nerve motor nerve conduction study shows normal  amplitudes, latency, and conduction velocity. F-responses are normal.    SENSORY NERVE CONDUCTION STUDIES:  Sensory nerve conduction study of the  right median nerve recorded in digit 2 shows borderline peak latency,  normal amplitudes, and borderline conduction velocity. Further mid palm  stimulation was obtained to confirm findings and shows mildly prolonged  latencies, normal amplitudes, and decreased conduction velocity. Left  median digit 2 examination shows normal amplitudes, latency, and  conduction velocity. Left median mid palm stimulation shows normal  amplitudes, latency, and conduction velocity. Right ulnar digit 2  examination shows normal amplitudes, latency, and conduction velocity. Left ulnar digit 2 examination shows normal amplitudes, latency, and  conduction velocity. Right ulnar mixed orthodromic study shows normal  amplitudes, latency, and conduction velocity.   Left ulnar mixed  orthodromic study shows normal amplitudes, latency, and conduction  velocity. The radial sensory nerve conduction studies are normal.    Needle electrode examination of the above-sampled muscles is entirely  normal.    IMPRESSION:  Borderline right median nerve neuropathy at the wrist  suggesting carpal tunnel syndrome. These findings are seen with  borderline sensory changes in the digit and palm stimulation. This does  not explain the patient's symptoms. The symptoms clinically may be  suggestive of thoracic outlet syndrome. Multiple sensory nerve conduction studies are evaluated to avoid any  artifact or temperature differences. This test is personally performed and interpreted by me. Thank you Yusra Caceres for asking us to assist in the care of this patient.     With kindest regards,        Jessica Gross MD    D: 11/24/2021 11:26:02       T: 11/24/2021 11:28:27     [id number]  Job#: [id number]     Doc#: [id number]    CC:

## 2021-11-29 ENCOUNTER — OFFICE VISIT (OUTPATIENT)
Dept: NEUROLOGY | Age: 36
End: 2021-11-29
Payer: COMMERCIAL

## 2021-11-29 VITALS
DIASTOLIC BLOOD PRESSURE: 68 MMHG | HEART RATE: 90 BPM | SYSTOLIC BLOOD PRESSURE: 108 MMHG | WEIGHT: 165 LBS | BODY MASS INDEX: 30.18 KG/M2

## 2021-11-29 DIAGNOSIS — H47.10 PAPILLEDEMA: ICD-10-CM

## 2021-11-29 PROCEDURE — 4004F PT TOBACCO SCREEN RCVD TLK: CPT

## 2021-11-29 PROCEDURE — G8427 DOCREV CUR MEDS BY ELIG CLIN: HCPCS

## 2021-11-29 PROCEDURE — G8482 FLU IMMUNIZE ORDER/ADMIN: HCPCS

## 2021-11-29 PROCEDURE — 99212 OFFICE O/P EST SF 10 MIN: CPT

## 2021-11-29 PROCEDURE — G8417 CALC BMI ABV UP PARAM F/U: HCPCS

## 2021-11-29 NOTE — PROGRESS NOTES
[facility] Outpatient Follow-Up Note  Name: Homer Byers  Age: 29 y.o. Gender: female  Primary Care Provider: Oliver Dancer, DO        Chief Complaint:Follow-up (Pt states that she had the lp done, and she says she was having really bad spinal headaches and had her down for about a week, but since then she has been doing better, she says that her eyes arent hurting as much, She says that the vessels in the eye still are not getting better. But the migraines are not as severe as they were since the pressure has been relieved. She also wants to go over the results of the emg as well. )      HPI: 44-year-old woman seen in follow-up for new daily persistent headache with migraine semiology. My colleague, Osman Avitia nurse practitioner, prescribed Nurtec. Which worked and was tolerated well but the patient's insurance did not approve. She also recently underwent a lumbar puncture for concern about possible papilledema. Negative MRI and MRV. LP done on November 11. Her opening pressure was 18 [normal] and went down to 6. She reports she had \"spinal headache\" for 7 days after this but now she is feeling better. She still has a headache when it is christopher but it is more manageable. Review of her chart indicates that while there was possible to have a deep papilledema reported her headaches improved with laying down on history initial appointment. She is quite convinced that she had increased pressure causing her symptoms. I reviewed with her that her CSF pressure was normal and whereas this does not have 100% reliability her history is also not consistent with IIH nor is her MRI. At this point in the appointment the patient became  hyper focused on the specific details of her surgery. She was quite insistent that the wrong surgery had been performed and believes that this explains all of her symptoms.  The MRI report refers to the L4-L5 level in a single paragraph for logical coherence, and says \"laminectomy\" in the paragraph without specifically indicating which level specifically. She feels this explains all of her symptoms, despite multiple explanations of how this does not map in a congruent manner on the neurologic system. I encouraged her to contact her neurosurgeons office. The patient then chose to leave the appointment. Unfortunately I was not able to perform a neurologic exam,  specifically to check her papilledema. Patient requested she be rescheduled with a different provider.         Patient Active Problem List   Diagnosis    Allergy status to penicillin    Anesthesia of skin    Esophageal disorder    Other specified postprocedural states    Bipolar affective disorder (HCC)    Cervical radiculopathy    Displacement of lumbar intervertebral disc without myelopathy    Eczema    Headache    Em's syndrome    Intractable migraine without aura    Chronic left-sided low back pain    Multiple benign nevi    Postoperative pain    Tinea versicolor    New daily persistent headache    Paresthesia of upper and lower extremities of both sides    S/P lumbar laminectomy       Past Medical History:   Diagnosis Date    ADHD (attention deficit hyperactivity disorder)     Asthma     exercise induced       Medications:  Reviewed    Current Outpatient Medications   Medication Sig Dispense Refill    rizatriptan (MAXALT) 5 MG tablet Take 1 tablet by mouth once as needed for Migraine May repeat in 2 hours if needed 9 tablet 5    esomeprazole (NEXIUM) 20 MG delayed release capsule Take 1 capsule by mouth 2 times daily 60 capsule 3    cyclobenzaprine (FLEXERIL) 10 MG tablet Take 10 mg by mouth 2 times daily as needed       Rimegepant Sulfate (NURTEC) 75 MG TBDP Take 75 mg by mouth as needed (dissolve under tongue at onset of migraine) 5 SAMPLES GIVEN  LOT: [id number]  EXP: 10/22 (Patient not taking: Reported on 11/29/2021) 10 tablet 0    Rimegepant Sulfate (NURTEC) 75 MG TBDP Take 75 mg by mouth as needed (dissolve under tongue at onset of migraine) 6 SAMPLES GIVEN  LOT: [id number]  EXP: 10/22 (Patient not taking: Reported on 2021) 12 tablet 0    Rimegepant Sulfate (NURTEC) 75 MG TBDP Take 75 mg by mouth daily as needed (migraine) (Patient not taking: Reported on 2021) 8 tablet 3     No current facility-administered medications for this visit. Allergies   Allergen Reactions    Amoxicillin Hives    Anesthetics, Halogenated      Hard to come out of it, hyperventilating, convulsion, nonresponsive    Oxycodone Nausea Only    Oxycodone-Acetaminophen Hives and Nausea And Vomiting    Topiramate Rash       Family History   Problem Relation Age of Onset    Hypertension Mother     No Known Problems Father     Breast Cancer Neg Hx     Cancer Neg Hx     Colon Cancer Neg Hx     Diabetes Neg Hx     Eclampsia Neg Hx     Ovarian Cancer Neg Hx      Labor Neg Hx     Spont Abortions Neg Hx     Stroke Neg Hx        Past Surgical History:   Procedure Laterality Date    BACK SURGERY          BACK SURGERY  2021    BREAST ENHANCEMENT SURGERY      COSMETIC SURGERY      breast implants    FINGER SURGERY      age 4    LIPOSUCTION          Social History     Tobacco History     Smoking Status  Current Every Day Smoker Smoking Frequency  0.5 packs/day for 19 years (9.5 pk yrs)    Smokeless Tobacco Use  Never Used    Tobacco Comment  started age 13          Alcohol History     Alcohol Use Status  Yes Amount  0.0 standard drinks of alcohol/wk Comment  ocassionally          Drug Use     Drug Use Status  No          Sexual Activity     Sexually Active  Never                  Vitals:    21 1021   BP: 108/68   Pulse: 90         Neurologic Exam    Patient walked out before neurologic exam could be performed.         Labs:   Lab Results   Component Value Date    WBC 6.4 10/22/2021    HGB 13.0 10/22/2021    HCT 38.6 10/22/2021    MCV 89.8 10/22/2021    PLT 409 (H) 10/22/2021     Lab Results   Component Value Date    LABA1C 5.0 10/22/2021         Radiology:    [unfilled]    Most recent    EEG No valid procedures specified. MRI of Brain No results found for this or any previous visit. Results for orders placed during the hospital encounter of 09/14/21    MRI BRAIN WO CONTRAST    Narrative  EXAMINATION: MRI BRAIN WO CONTRAST, MRV HEAD WO CONTRAST    CLINICAL HISTORY: G44.52 New daily persistent headache ICD10    COMPARISONS: NONE AVAILABLE    TECHNIQUE:    Multiplanar multisequence images of the brain were obtained without contrast. Diffusion perfusion imaging was obtained. FINDINGS:    There are no extra-axial collections. There is no evidence of hemorrhage. There are no areas of perfusion diffusion signal abnormality to suggest ischemia. The susceptibility images do not demonstrate evidence of hemosiderin deposition within the brain  parenchyma or the leptomeninges. There is preservation of the gray-white matter differentiation. There are no areas of signal abnormality within the brain parenchyma or the posterior fossa to suggest lesion. The sulci and ventricles are within normal limits without evidence of  hydrocephalus. The midline structures are intact, the corpus callosum is within normal limits. The region of the pineal gland and the sella turcica are unremarkable. There are no space-occupying lesions in the posterior fossa. The basilar cisterns are patent. The craniocervical junction is unremarkable. There is normal flow without evidence of opacification more thrombus in the superior sagittal sinus, straight sinus, the transverse sinuses and sigmoid sinuses. The visualized portions of the orbits are within normal limits, the globes are intact. There are no intra or extraconal lesions. The optic nerves are symmetric and appear within normal limits. The visualized portions of the paranasal sinuses are within  normal limits.     The calvarium and soft tissues are unremarkable. Impression  There are no acute intracranial changes, no evidence of ischemia or hemorrhage. There are no regions of signal abnormality. There is no evidence of venous sinus thrombosis. MRA of the Head and Neck: No results found for this or any previous visit. No results found for this or any previous visit. No results found for this or any previous visit. CT of the Head: No results found for this or any previous visit. No results found for this or any previous visit. No results found for this or any previous visit. Carotid duplex: No results found for this or any previous visit. No results found for this or any previous visit. No results found for this or any previous visit. Echo No results found for this or any previous visit.       Assessment/Plan:  E2 billing as patient left appointment before exam/plan    Electronically signed by Paulie Zhang MD on 11/29/2021 at 11:04 AM

## 2021-12-08 ENCOUNTER — HOSPITAL ENCOUNTER (OUTPATIENT)
Dept: NEUROLOGY | Age: 36
Discharge: HOME | End: 2021-12-08
Payer: COMMERCIAL

## 2021-12-08 DIAGNOSIS — R20.2 PARESTHESIA OF UPPER AND LOWER EXTREMITIES OF BOTH SIDES: ICD-10-CM

## 2021-12-08 PROCEDURE — 95886 MUSC TEST DONE W/N TEST COMP: CPT | Performed by: PSYCHIATRY & NEUROLOGY

## 2021-12-08 PROCEDURE — 95912 NRV CNDJ TEST 11-12 STUDIES: CPT

## 2021-12-08 PROCEDURE — 95912 NRV CNDJ TEST 11-12 STUDIES: CPT | Performed by: PSYCHIATRY & NEUROLOGY

## 2021-12-08 PROCEDURE — 95886 MUSC TEST DONE W/N TEST COMP: CPT

## 2021-12-08 NOTE — PROCEDURES
[address]                      [facility], [address] [facility]                             ELECTROMYOGRAM REPORT    PATIENT NAME: Cierra Downs                   :        1985  MED REC NO:   [id number]                            ROOM:  ACCOUNT NO:   [de-identified]                           ADMIT DATE: 2021  PROVIDER:     London Garcia MD    DATE OF EM2021    REASON FOR STUDY:  Neurophysiological studies are requested for the  patient's intermittent numbness of the lower extremities. FINDINGS:  Motor nerve conduction study of the right common peroneal  nerve shows normal amplitudes, latency, and conduction velocity. Motor  nerve conduction study of the left common peroneal nerve shows normal  amplitudes, latency, and conduction velocity. Motor nerve conduction  study of the right tibial nerve shows normal amplitudes, latency, and  conduction velocity. Motor nerve conduction study of the left tibial  nerve shows normal amplitudes, latency, and conduction velocity. The  right sural nerve conduction study shows normal peak latency, low  amplitudes, and normal conduction velocity. The left sural nerve  conduction study shows normal amplitudes, latency, and conduction  velocity. The right superficial peroneal nerve shows normal peak  latency, normal amplitudes, and normal conduction velocity. The left  superficial peroneal nerve shows normal peak latency, normal amplitudes,  and normal conduction velocity. The right saphenous nerve shows normal  amplitudes, latency, and conduction velocity. The left saphenous nerve  was not recordable. Needle electrode examination is entirely normal.    H-responses are normal.  F-responses are normal.    IMPRESSION:  Normal nerve conduction studies of the lower extremities. There is no suggestion of any compressive neuropathies. No active or  chronic radicular findings are notable on this EMG.     Multiple sensory nerve conduction studies are evaluated to avoid any  artifact or temperature differences. Thank you Sherrlyn Olszewski for asking us to assist in the care of this patient.     With kindest regards,        Jasmin Bhakta MD    D: 12/08/2021 11:25:53       T: 12/08/2021 11:28:24     [email or web address]  Job#: [id number]     Doc#: [id number]    CC:

## 2021-12-14 ENCOUNTER — TELEPHONE (OUTPATIENT)
Dept: NEUROLOGY | Age: 36
End: 2021-12-14

## 2021-12-15 ENCOUNTER — TELEPHONE (OUTPATIENT)
Dept: NEUROLOGY | Age: 36
End: 2021-12-15

## 2021-12-27 ENCOUNTER — VIRTUAL VISIT (OUTPATIENT)
Dept: FAMILY MEDICINE CLINIC | Age: 36
End: 2021-12-27
Payer: COMMERCIAL

## 2021-12-27 DIAGNOSIS — Z20.822 CLOSE EXPOSURE TO COVID-19 VIRUS: ICD-10-CM

## 2021-12-27 DIAGNOSIS — J06.9 VIRAL URI: Primary | ICD-10-CM

## 2021-12-27 PROCEDURE — 99422 OL DIG E/M SVC 11-20 MIN: CPT | Performed by: STUDENT IN AN ORGANIZED HEALTH CARE EDUCATION/TRAINING PROGRAM

## 2021-12-27 ASSESSMENT — ENCOUNTER SYMPTOMS
RHINORRHEA: 0
SHORTNESS OF BREATH: 0
SINUS PAIN: 0
ABDOMINAL PAIN: 0
SORE THROAT: 0
EYE DISCHARGE: 0
SINUS PRESSURE: 0
NAUSEA: 0
DIARRHEA: 0
COUGH: 0
WHEEZING: 0
VOMITING: 0

## 2021-12-27 NOTE — PROGRESS NOTES
fatigue. Negative for chills, fever and unexpected weight change. HENT: Negative for congestion, ear pain, postnasal drip, rhinorrhea, sinus pressure, sinus pain and sore throat. Eyes: Negative for discharge. Respiratory: Negative for cough, shortness of breath and wheezing. Cardiovascular: Negative for chest pain, palpitations and leg swelling. Gastrointestinal: Negative for abdominal pain, diarrhea, nausea and vomiting. Genitourinary: Negative for difficulty urinating. Musculoskeletal: Negative for arthralgias and joint swelling. Skin: Negative for rash. Neurological: Negative for weakness, light-headedness, numbness and headaches. No flowsheet data found. Physical Exam  Due to nature of virtual visit, unable to complete full physical exam at this time, patient does not sound short of breath while talking. She does not appear to be in respiratory distress. On this date 12/27/2021 I have spent 15 minutes reviewing previous notes, test results and face to face (virtual) with the patient discussing the diagnosis and importance of compliance with the treatment plan as well as documenting on the day of the visit. Danielle Mai, was evaluated through a synchronous (real-time) audio-video encounter. The patient (or guardian if applicable) is aware that this is a billable service. Verbal consent to proceed has been obtained within the past 12 months. The visit was conducted pursuant to the emergency declaration under the [facility], [address] [place] [address] authority and the [employer] and [employer] General Act. Patient identification was verified, and a caregiver was present when appropriate. The patient was located in a state where the provider was credentialed to provide care. An electronic signature was used to authenticate this note.     --Damien Page DO

## 2022-01-28 ENCOUNTER — OFFICE VISIT (OUTPATIENT)
Dept: NEUROLOGY | Age: 37
End: 2022-01-28
Payer: COMMERCIAL

## 2022-01-28 VITALS
SYSTOLIC BLOOD PRESSURE: 120 MMHG | HEART RATE: 89 BPM | DIASTOLIC BLOOD PRESSURE: 80 MMHG | BODY MASS INDEX: 31.28 KG/M2 | WEIGHT: 171 LBS

## 2022-01-28 DIAGNOSIS — G43.709 CHRONIC MIGRAINE WITHOUT AURA WITHOUT STATUS MIGRAINOSUS, NOT INTRACTABLE: ICD-10-CM

## 2022-01-28 DIAGNOSIS — H47.10 PAPILLEDEMA: ICD-10-CM

## 2022-01-28 DIAGNOSIS — G93.2 PSEUDOTUMOR CEREBRI: Primary | ICD-10-CM

## 2022-01-28 DIAGNOSIS — M54.16 LUMBAR RADICULOPATHY: ICD-10-CM

## 2022-01-28 PROCEDURE — 4004F PT TOBACCO SCREEN RCVD TLK: CPT | Performed by: PSYCHIATRY & NEUROLOGY

## 2022-01-28 PROCEDURE — G8427 DOCREV CUR MEDS BY ELIG CLIN: HCPCS | Performed by: PSYCHIATRY & NEUROLOGY

## 2022-01-28 PROCEDURE — 99214 OFFICE O/P EST MOD 30 MIN: CPT | Performed by: PSYCHIATRY & NEUROLOGY

## 2022-01-28 PROCEDURE — G8482 FLU IMMUNIZE ORDER/ADMIN: HCPCS | Performed by: PSYCHIATRY & NEUROLOGY

## 2022-01-28 PROCEDURE — G8417 CALC BMI ABV UP PARAM F/U: HCPCS | Performed by: PSYCHIATRY & NEUROLOGY

## 2022-01-28 RX ORDER — ACETAZOLAMIDE 250 MG/1
250 TABLET ORAL 2 TIMES DAILY
Qty: 60 TABLET | Refills: 3 | Status: SHIPPED | OUTPATIENT
Start: 2022-01-28 | End: 2022-08-12

## 2022-01-28 ASSESSMENT — ENCOUNTER SYMPTOMS
SHORTNESS OF BREATH: 0
NAUSEA: 0
BACK PAIN: 0
VOMITING: 0
CHOKING: 0
TROUBLE SWALLOWING: 0
PHOTOPHOBIA: 0
COLOR CHANGE: 0

## 2022-01-28 NOTE — PROGRESS NOTES
Subjective:      Patient ID: Nacho Haines is a 40 y.o. female who presents today for:  Chief Complaint   Patient presents with    Follow-up     papilledema would like a second opinion from         HPI 37-year right-handed female who I have not seen in the past and was initially followed by my NP and was also seen by Dr. Danyell Gutierrez.  Patient is here for second opinion as she reports that she did not like the way she was treated. Extensive chart review has been done and as noted patient had a lumbar spine surgery at [facility] and since then she has had issues. After the surgery when she woke up she was ataxic and had considerable pain in the neck area and the spine and discontinued for a while with increasing headaches. She then was seen by ophthalmology and reportedly has had papilledema. She was referred here for the same. We had further obtain a lumbar puncture with initial CSF pressure of 18 but went down to 6. Patient felt much better after the lumbar puncture though she then developed post LP headache. \Somewhat better controlled but she still has numbness and tingling in hands and legs we are obtain an EMG study which is essentially normal except for mild carpal tunnel syndrome. She does have coldness of the hands and feet and I think she has Raynaud's disease. She was seen by rheumatology with a confirmation of the same. She reports that she has pain in the thigh area bilaterally with redness that occurs on and off. Back still bothers her. She has not had a recent eye examination.   She has history of occasional migraine headaches and takes Maxalt is on Flexeril for pain both in the back of the neck    Past Medical History:   Diagnosis Date    ADHD (attention deficit hyperactivity disorder)     Asthma     exercise induced     Past Surgical History:   Procedure Laterality Date    BACK SURGERY      2006    BACK SURGERY  08/05/2021    BREAST ENHANCEMENT SURGERY      COSMETIC SURGERY breast implants    FINGER SURGERY      age 3    LIPOSUCTION       Social History     Socioeconomic History    Marital status: Single     Spouse name: Not on file    Number of children: Not on file    Years of education: Not on file    Highest education level: Not on file   Occupational History    Not on file   Tobacco Use    Smoking status: Current Every Day Smoker     Packs/day: 0.50     Years: 19.00     Pack years: 9.50    Smokeless tobacco: Never Used    Tobacco comment: started age 13   Vaping Use    Vaping Use: Never used   Substance and Sexual Activity    Alcohol use: Yes     Alcohol/week: 0.0 standard drinks     Comment: ocassionally    Drug use: No    Sexual activity: Never   Other Topics Concern    Not on file   Social History Narrative    Not on file     Social Determinants of Health     Financial Resource Strain: Medium Risk    Difficulty of Paying Living Expenses: Somewhat hard   Food Insecurity: No Food Insecurity    Worried About Running Out of Food in the Last Year: Never true    Phill of Food in the Last Year: Never true   Transportation Needs: No Transportation Needs    Lack of Transportation (Medical): No    Lack of Transportation (Non-Medical):  No   Physical Activity:     Days of Exercise per Week: Not on file    Minutes of Exercise per Session: Not on file   Stress:     Feeling of Stress : Not on file   Social Connections:     Frequency of Communication with Friends and Family: Not on file    Frequency of Social Gatherings with Friends and Family: Not on file    Attends Sikh Services: Not on file    Active Member of Clubs or Organizations: Not on file    Attends Club or Organization Meetings: Not on file    Marital Status: Not on file   Intimate Partner Violence:     Fear of Current or Ex-Partner: Not on file    Emotionally Abused: Not on file    Physically Abused: Not on file    Sexually Abused: Not on file   Housing Stability:     Unable to Pay for Housing in the Last Year: Not on file    Number of Places Lived in the Last Year: Not on file    Unstable Housing in the Last Year: Not on file     Family History   Problem Relation Age of Onset    Hypertension Mother     No Known Problems Father     Breast Cancer Neg Hx     Cancer Neg Hx     Colon Cancer Neg Hx     Diabetes Neg Hx     Eclampsia Neg Hx     Ovarian Cancer Neg Hx      Labor Neg Hx     Spont Abortions Neg Hx     Stroke Neg Hx      Allergies   Allergen Reactions    Amoxicillin Hives    Anesthetics, Halogenated      Hard to come out of it, hyperventilating, convulsion, nonresponsive    Oxycodone Nausea Only    Oxycodone-Acetaminophen Hives and Nausea And Vomiting    Topiramate Rash       Current Outpatient Medications   Medication Sig Dispense Refill    acetaZOLAMIDE (DIAMOX) 250 MG tablet Take 1 tablet by mouth 2 times daily 60 tablet 3    rizatriptan (MAXALT) 5 MG tablet Take 1 tablet by mouth once as needed for Migraine May repeat in 2 hours if needed 9 tablet 5    esomeprazole (NEXIUM) 20 MG delayed release capsule Take 1 capsule by mouth 2 times daily 60 capsule 3    cyclobenzaprine (FLEXERIL) 10 MG tablet Take 10 mg by mouth 2 times daily as needed        No current facility-administered medications for this visit. Review of Systems   Constitutional: Negative for fever. HENT: Negative for ear pain, tinnitus and trouble swallowing. Eyes: Negative for photophobia and visual disturbance. Respiratory: Negative for choking and shortness of breath. Cardiovascular: Negative for chest pain and palpitations. Gastrointestinal: Negative for nausea and vomiting. Musculoskeletal: Negative for back pain, gait problem, joint swelling, myalgias, neck pain and neck stiffness. Skin: Negative for color change. Allergic/Immunologic: Negative for food allergies.    Neurological: Negative for dizziness, tremors, seizures, syncope, facial asymmetry, speech difficulty, weakness, light-headedness, numbness and headaches. Psychiatric/Behavioral: Negative for behavioral problems, confusion, hallucinations and sleep disturbance. Objective:   /80 (Site: Left Upper Arm, Position: Sitting, Cuff Size: Medium Adult)   Pulse 89   Wt 171 lb (77.6 kg)   BMI 31.28 kg/m²     Physical Exam  Vitals reviewed. Eyes:      Pupils: Pupils are equal, round, and reactive to light. Cardiovascular:      Rate and Rhythm: Normal rate and regular rhythm. Heart sounds: No murmur heard. Pulmonary:      Effort: Pulmonary effort is normal.      Breath sounds: Normal breath sounds. Abdominal:      General: Bowel sounds are normal.   Musculoskeletal:         General: Normal range of motion. Cervical back: Normal range of motion. Skin:     General: Skin is warm. Neurological:      Mental Status: She is alert and oriented to person, place, and time. Cranial Nerves: No cranial nerve deficit. Sensory: No sensory deficit. Motor: No abnormal muscle tone. Coordination: Coordination normal.      Deep Tendon Reflexes: Reflexes are normal and symmetric. Babinski sign absent on the right side. Babinski sign absent on the left side. Psychiatric:         Mood and Affect: Mood normal.     None is notable in the lumbar spine with a formal eye examination not done as she is already been diagnosed with papilledema. No results found.     Lab Results   Component Value Date    WBC 6.4 10/22/2021    RBC 4.30 10/22/2021    HGB 13.0 10/22/2021    HCT 38.6 10/22/2021    MCV 89.8 10/22/2021    MCH 30.3 10/22/2021    MCHC 33.7 10/22/2021    RDW 14.2 10/22/2021     10/22/2021     Lab Results   Component Value Date     10/22/2021    K 4.4 10/22/2021     10/22/2021    CO2 23 10/22/2021    BUN 9 10/22/2021    CREATININE 0.57 10/22/2021    GFRAA >60.0 10/22/2021    LABGLOM >60.0 10/22/2021    GLUCOSE 90 10/22/2021    PROT 6.8 10/22/2021    PROT 6.9 10/22/2021 LABALBU 4.08 10/22/2021    LABALBU 4.6 10/22/2021    CALCIUM 9.7 10/22/2021    BILITOT <0.2 10/22/2021    ALKPHOS 98 10/22/2021    AST 16 10/22/2021    ALT 16 10/22/2021     Lab Results   Component Value Date    PROTIME 14.0 11/08/2021    INR 1.1 11/08/2021     Lab Results   Component Value Date    DCYNBYLG66 725 10/22/2021    FOLATE 11.0 10/22/2021     No results found for: TRIG, HDL, LDLCALC, LDLDIRECT, LABVLDL  Lab Results   Component Value Date    LABAMPH Neg 03/28/2016    BARBSCNU Neg 03/28/2016    LABBENZ Neg 03/28/2016    OPIATESCREENURINE Neg 03/28/2016    PHENCYCLIDINESCREENURINE Neg 03/28/2016     No results found for: LITHIUM, DILFRTOT, VALPROATE    Assessment:       Diagnosis Orders   1. Pseudotumor cerebri     2. Papilledema  External Referral to Ophthalmology    Basic Metabolic Panel    Sedimentation Rate    C-Reactive Protein    Vitamin B12 & Folate    Cardiolipin Ab IgG, IgM, IgA    Anti-Dna Antibody, Double-Stranded   3. Lumbar radiculopathy     4. Chronic migraine without aura without status migrainosus, not intractable     Pseudotumor cerebri from the ophthalmic examination as noted. Patient had a lumbar puncture with a CSF opening pressure of 18 and closing pressure of 6 with significant improvement in her headaches. Given these findings and examination patient truly does have pseudotumor cerebri. Lumbar stenosis of pressures are low to start with and become much lower with improvement of headaches. Patient also had lumbar surgery is likely that she may had some leak initially of the CSF causing the symptoms that occurred after surgery which may have caused low CSF pressures. Patient likely has sealed off the leak given that she is not symptomatic. She had a significant postop headache which is improved. These 2 pathologies are likely unrelated. Recommended that we start her on Diamox.   She does have numbness and tingling of her upper and lower extremities and truly has Raynaud's phenomena which was diagnosed also by rheumatology. Diamox can cause increased numbness and tingling and I have reassured her that this is not from the medicine but from Raynaud's for now. Patient has some thigh rash that she is reporting at times and could have livedo reticularis we will look into this. I have explained to her in my best of the ability is of the diagnosis that she has. Recommended that she follow-up with her eye exam and this actually should with that by ophthalmology and not optometry to see if there is any other pathology of concern. We will review her in 3 months and see how she does    [place] MD, [address], American Board of Psychiatry & Neurology  Board Certified in Vascular Neurology  Board Certified in Neuromuscular Medicine  Certified in [address] [place] [address] Drive:      Orders Placed This Encounter   Procedures    Basic Metabolic Panel     In 4 weeks     Standing Status:   Future     Standing Expiration Date:   1/28/2023    Sedimentation Rate     Standing Status:   Future     Standing Expiration Date:   1/28/2023    C-Reactive Protein     Standing Status:   Future     Standing Expiration Date:   1/28/2023    Vitamin B12 & Folate     Standing Status:   Future     Standing Expiration Date:   1/28/2023    Cardiolipin Ab IgG, IgM, IgA     Standing Status:   Future     Standing Expiration Date:   1/28/2023    Anti-Dna Antibody, Double-Stranded     Standing Status:   Future     Standing Expiration Date:   1/28/2023    External Referral to Ophthalmology     Referral Priority:   Routine     Referral Type:   Eval and Treat     Referral Reason:   Specialty Services Required     Requested Specialty:   Ophthalmology     Number of Visits Requested:   1     Orders Placed This Encounter   Medications    acetaZOLAMIDE (DIAMOX) 250 MG tablet     Sig: Take 1 tablet by mouth 2 times daily     Dispense:  60 tablet     Refill:  3       Return in about 3 months (around 4/28/2022).       Sixto Tapia MD

## 2022-02-01 ENCOUNTER — TELEPHONE (OUTPATIENT)
Dept: FAMILY MEDICINE CLINIC | Age: 37
End: 2022-02-01

## 2022-02-01 NOTE — TELEPHONE ENCOUNTER
PA started on Cover My Meds for Esomeprazole Magnesium.      Patient maybe required to try & fail 2 of the following before approval can be made:    Lansoprazole Caps  Nexium Packets  Omeprazole Caps  Pantoprazole Tabs  Protonix Suspension ( no authorization is required age 10 or younger)

## 2022-02-07 ENCOUNTER — TELEPHONE (OUTPATIENT)
Dept: NEUROLOGY | Age: 37
End: 2022-02-07

## 2022-02-07 NOTE — TELEPHONE ENCOUNTER
PA was DENIED patient must try & fail TWO preferred prton pump inhibitors: Lansoprazole capsules, nexium packets, omeprazole capsules, pantoprazole tablets, or protonix suspension (no authorization required for members age 10 or younger).

## 2022-02-07 NOTE — TELEPHONE ENCOUNTER
Pt called in and states that she is getting nausea with the diamox. She is wondering if you would be able to give a nausea medications. She states that it is pretty constant now.

## 2022-02-08 RX ORDER — ONDANSETRON 4 MG/1
4 TABLET, FILM COATED ORAL 2 TIMES DAILY PRN
Qty: 60 TABLET | Refills: 0 | Status: SHIPPED | OUTPATIENT
Start: 2022-02-08 | End: 2022-03-10

## 2022-02-08 NOTE — TELEPHONE ENCOUNTER
LVM for pt to call office back to inform. Medication was pended in refill encounter to local pharmacy.

## 2022-02-14 DIAGNOSIS — K21.9 GASTROESOPHAGEAL REFLUX DISEASE, UNSPECIFIED WHETHER ESOPHAGITIS PRESENT: Primary | ICD-10-CM

## 2022-02-14 RX ORDER — PANTOPRAZOLE SODIUM 40 MG/1
40 TABLET, DELAYED RELEASE ORAL
Qty: 30 TABLET | Refills: 3 | Status: SHIPPED | OUTPATIENT
Start: 2022-02-14 | End: 2022-10-31

## 2022-03-18 ENCOUNTER — TELEPHONE (OUTPATIENT)
Dept: FAMILY MEDICINE CLINIC | Age: 37
End: 2022-03-18

## 2022-03-18 NOTE — TELEPHONE ENCOUNTER
Patient called about the script for pantoprazole 40 mg tablets. She is trying to get the refill and the insurance is requesting a PA for the refill. Thank you.

## 2022-03-21 NOTE — TELEPHONE ENCOUNTER
Called pharmacy since nothing has been rcvd about need for PA. She stated this medication does not need a PA. Called & informed pt. & told her if she has any problems to call back & let me know.

## 2022-03-30 ENCOUNTER — HOSPITAL ENCOUNTER (EMERGENCY)
Age: 37
Discharge: HOME | End: 2022-03-30
Attending: EMERGENCY MEDICINE
Payer: COMMERCIAL

## 2022-03-30 ENCOUNTER — TELEPHONE (OUTPATIENT)
Dept: NEUROLOGY | Age: 37
End: 2022-03-30

## 2022-03-30 ENCOUNTER — APPOINTMENT (OUTPATIENT)
Dept: CT IMAGING | Age: 37
End: 2022-03-30
Payer: COMMERCIAL

## 2022-03-30 VITALS
SYSTOLIC BLOOD PRESSURE: 102 MMHG | RESPIRATION RATE: 15 BRPM | DIASTOLIC BLOOD PRESSURE: 68 MMHG | BODY MASS INDEX: 29.44 KG/M2 | HEART RATE: 93 BPM | TEMPERATURE: 98.6 F | WEIGHT: 160 LBS | HEIGHT: 62 IN | OXYGEN SATURATION: 100 %

## 2022-03-30 DIAGNOSIS — G43.109 COMPLICATED MIGRAINE: Primary | ICD-10-CM

## 2022-03-30 LAB
ALBUMIN SERPL-MCNC: 4.6 G/DL (ref 3.5–4.6)
ALP BLD-CCNC: 82 U/L (ref 40–130)
ALT SERPL-CCNC: 12 U/L (ref 0–33)
AMPHETAMINE SCREEN, URINE: NORMAL
ANION GAP SERPL CALCULATED.3IONS-SCNC: 10 MEQ/L (ref 9–15)
AST SERPL-CCNC: 17 U/L (ref 0–35)
BACTERIA: ABNORMAL /HPF
BARBITURATE SCREEN URINE: NORMAL
BASOPHILS ABSOLUTE: 0 K/UL (ref 0–0.2)
BASOPHILS RELATIVE PERCENT: 0.6 %
BENZODIAZEPINE SCREEN, URINE: NORMAL
BILIRUB SERPL-MCNC: <0.2 MG/DL (ref 0.2–0.7)
BILIRUBIN URINE: NEGATIVE
BLOOD, URINE: ABNORMAL
BUN BLDV-MCNC: 8 MG/DL (ref 6–20)
CALCIUM SERPL-MCNC: 9.4 MG/DL (ref 8.5–9.9)
CANNABINOID SCREEN URINE: NORMAL
CHLORIDE BLD-SCNC: 110 MEQ/L (ref 95–107)
CHP ED QC CHECK: YES
CLARITY: CLEAR
CO2: 21 MEQ/L (ref 20–31)
COCAINE METABOLITE SCREEN URINE: NORMAL
COLOR: YELLOW
CREAT SERPL-MCNC: 0.69 MG/DL (ref 0.5–0.9)
EOSINOPHILS ABSOLUTE: 0.1 K/UL (ref 0–0.7)
EOSINOPHILS RELATIVE PERCENT: 1.4 %
EPITHELIAL CELLS, UA: ABNORMAL /HPF (ref 0–5)
ETHANOL PERCENT: NORMAL G/DL
ETHANOL: <10 MG/DL (ref 0–0.08)
GFR AFRICAN AMERICAN: >60
GFR AFRICAN AMERICAN: >60
GFR NON-AFRICAN AMERICAN: >60
GFR NON-AFRICAN AMERICAN: >60
GLOBULIN: 2.2 G/DL (ref 2.3–3.5)
GLUCOSE BLD-MCNC: 88 MG/DL (ref 70–99)
GLUCOSE BLD-MCNC: 91 MG/DL
GLUCOSE BLD-MCNC: 91 MG/DL (ref 70–99)
GLUCOSE URINE: NEGATIVE MG/DL
HCG, URINE, POC: NEGATIVE
HCT VFR BLD CALC: 37.3 % (ref 37–47)
HEMOGLOBIN: 12.5 G/DL (ref 12–16)
HYALINE CASTS: ABNORMAL /HPF (ref 0–5)
KETONES, URINE: NEGATIVE MG/DL
LACTIC ACID: 0.7 MMOL/L (ref 0.5–2.2)
LEUKOCYTE ESTERASE, URINE: NEGATIVE
LYMPHOCYTES ABSOLUTE: 2.3 K/UL (ref 1–4.8)
LYMPHOCYTES RELATIVE PERCENT: 32.7 %
Lab: NORMAL
Lab: NORMAL
MAGNESIUM: 2.5 MG/DL (ref 1.7–2.4)
MCH RBC QN AUTO: 30 PG (ref 27–31.3)
MCHC RBC AUTO-ENTMCNC: 33.5 % (ref 33–37)
MCV RBC AUTO: 89.6 FL (ref 82–100)
METHADONE SCREEN, URINE: NORMAL
MONOCYTES ABSOLUTE: 0.5 K/UL (ref 0.2–0.8)
MONOCYTES RELATIVE PERCENT: 7 %
NEGATIVE QC PASS/FAIL: NORMAL
NEUTROPHILS ABSOLUTE: 4 K/UL (ref 1.4–6.5)
NEUTROPHILS RELATIVE PERCENT: 58.3 %
NITRITE, URINE: NEGATIVE
OPIATE SCREEN URINE: NORMAL
OXYCODONE URINE: NORMAL
PDW BLD-RTO: 14.3 % (ref 11.5–14.5)
PERFORMED ON: NORMAL
PERFORMED ON: NORMAL
PH UA: 8.5 (ref 5–9)
PHENCYCLIDINE SCREEN URINE: NORMAL
PLATELET # BLD: 357 K/UL (ref 130–400)
POC CREATININE WHOLE BLOOD: 0.7
POC CREATININE: 0.7 MG/DL (ref 0.6–1.2)
POC SAMPLE TYPE: NORMAL
POSITIVE QC PASS/FAIL: NORMAL
POTASSIUM SERPL-SCNC: 4.1 MEQ/L (ref 3.4–4.9)
PROPOXYPHENE SCREEN: NORMAL
PROTEIN UA: NEGATIVE MG/DL
RBC # BLD: 4.17 M/UL (ref 4.2–5.4)
RBC UA: ABNORMAL /HPF (ref 0–5)
SODIUM BLD-SCNC: 141 MEQ/L (ref 135–144)
SPECIFIC GRAVITY UA: 1 (ref 1–1.03)
TOTAL PROTEIN: 6.8 G/DL (ref 6.3–8)
TSH REFLEX: 1.11 UIU/ML (ref 0.44–3.86)
URINE REFLEX TO CULTURE: ABNORMAL
UROBILINOGEN, URINE: 0.2 E.U./DL
WBC # BLD: 6.9 K/UL (ref 4.8–10.8)
WBC UA: ABNORMAL /HPF (ref 0–5)

## 2022-03-30 PROCEDURE — 83605 ASSAY OF LACTIC ACID: CPT

## 2022-03-30 PROCEDURE — 36415 COLL VENOUS BLD VENIPUNCTURE: CPT

## 2022-03-30 PROCEDURE — 96375 TX/PRO/DX INJ NEW DRUG ADDON: CPT

## 2022-03-30 PROCEDURE — 2580000003 HC RX 258

## 2022-03-30 PROCEDURE — 84443 ASSAY THYROID STIM HORMONE: CPT

## 2022-03-30 PROCEDURE — 6360000002 HC RX W HCPCS: Performed by: EMERGENCY MEDICINE

## 2022-03-30 PROCEDURE — 80307 DRUG TEST PRSMV CHEM ANLYZR: CPT

## 2022-03-30 PROCEDURE — 83735 ASSAY OF MAGNESIUM: CPT

## 2022-03-30 PROCEDURE — 70450 CT HEAD/BRAIN W/O DYE: CPT

## 2022-03-30 PROCEDURE — 85025 COMPLETE CBC W/AUTO DIFF WBC: CPT

## 2022-03-30 PROCEDURE — 99283 EMERGENCY DEPT VISIT LOW MDM: CPT

## 2022-03-30 PROCEDURE — 81001 URINALYSIS AUTO W/SCOPE: CPT

## 2022-03-30 PROCEDURE — 80053 COMPREHEN METABOLIC PANEL: CPT

## 2022-03-30 PROCEDURE — 96374 THER/PROPH/DIAG INJ IV PUSH: CPT

## 2022-03-30 PROCEDURE — 82077 ASSAY SPEC XCP UR&BREATH IA: CPT

## 2022-03-30 RX ORDER — KETOROLAC TROMETHAMINE 30 MG/ML
30 INJECTION, SOLUTION INTRAMUSCULAR; INTRAVENOUS ONCE
Status: COMPLETED | OUTPATIENT
Start: 2022-03-30 | End: 2022-03-30

## 2022-03-30 RX ORDER — METOCLOPRAMIDE 10 MG/1
10 TABLET ORAL 4 TIMES DAILY PRN
Qty: 30 TABLET | Refills: 2 | Status: SHIPPED | OUTPATIENT
Start: 2022-03-30

## 2022-03-30 RX ORDER — DIPHENHYDRAMINE HYDROCHLORIDE 50 MG/ML
12.5 INJECTION INTRAMUSCULAR; INTRAVENOUS ONCE
Status: COMPLETED | OUTPATIENT
Start: 2022-03-30 | End: 2022-03-30

## 2022-03-30 RX ORDER — 0.9 % SODIUM CHLORIDE 0.9 %
500 INTRAVENOUS SOLUTION INTRAVENOUS ONCE
Status: COMPLETED | OUTPATIENT
Start: 2022-03-30 | End: 2022-03-30

## 2022-03-30 RX ORDER — DEXAMETHASONE SODIUM PHOSPHATE 10 MG/ML
6 INJECTION INTRAMUSCULAR; INTRAVENOUS ONCE
Status: COMPLETED | OUTPATIENT
Start: 2022-03-30 | End: 2022-03-30

## 2022-03-30 RX ORDER — SODIUM CHLORIDE 9 MG/ML
INJECTION, SOLUTION INTRAVENOUS
Status: COMPLETED
Start: 2022-03-30 | End: 2022-03-30

## 2022-03-30 RX ORDER — METOCLOPRAMIDE HYDROCHLORIDE 5 MG/ML
10 INJECTION INTRAMUSCULAR; INTRAVENOUS ONCE
Status: COMPLETED | OUTPATIENT
Start: 2022-03-30 | End: 2022-03-30

## 2022-03-30 RX ADMIN — Medication 500 ML: at 16:58

## 2022-03-30 RX ADMIN — DEXAMETHASONE SODIUM PHOSPHATE 6 MG: 10 INJECTION INTRAMUSCULAR; INTRAVENOUS at 17:00

## 2022-03-30 RX ADMIN — SODIUM CHLORIDE 500 ML: 9 INJECTION, SOLUTION INTRAVENOUS at 16:58

## 2022-03-30 RX ADMIN — METOCLOPRAMIDE HYDROCHLORIDE 10 MG: 5 INJECTION INTRAMUSCULAR; INTRAVENOUS at 17:00

## 2022-03-30 RX ADMIN — KETOROLAC TROMETHAMINE 30 MG: 30 INJECTION, SOLUTION INTRAMUSCULAR at 17:00

## 2022-03-30 RX ADMIN — DIPHENHYDRAMINE HYDROCHLORIDE 12.5 MG: 50 INJECTION, SOLUTION INTRAMUSCULAR; INTRAVENOUS at 17:00

## 2022-03-30 ASSESSMENT — PAIN SCALES - GENERAL: PAINLEVEL_OUTOF10: 6

## 2022-03-30 ASSESSMENT — ENCOUNTER SYMPTOMS
EYE PAIN: 0
VOMITING: 0
ABDOMINAL PAIN: 0
NAUSEA: 0
SORE THROAT: 0
CHEST TIGHTNESS: 0
SHORTNESS OF BREATH: 0

## 2022-03-30 NOTE — ED PROVIDER NOTES
[address] [facility]  EMERGENCY DEPARTMENT ENCOUNTER      Pt Name: Mary Camacho  MRN: [id number]  [place] 1985  Date of evaluation: 3/30/2022  Provider: Greg Chris DO    CHIEF COMPLAINT       Chief Complaint   Patient presents with    Eye Problem     fussy tunnel vision         HISTORY OF PRESENT ILLNESS   (Location/Symptom, Timing/Onset, Context/Setting, Quality, Duration, Modifying Factors, Severity)  Note limiting factors. Mary Camacho is a 40 y.o. female who presents to the emergency department . Patient comes in because she was driving and suddenly she seemed to lose her peripheral vision and her main vision was blurry. She has had a headache for a couple of days. She does have history of migraines but she also has history of pseudotumor cerebri and papilledema. Patient is on Diamox and sees Dr. Caridad Mansfield from neurology. Patient denies any focal weakness or numbness that is new. Has ongoing numbness to her upper extremities. Patient is ambulatory. She did get a ride here. She is not vomiting no fevers or chills. HPI    Nursing Notes were reviewed. REVIEW OF SYSTEMS    (2-9 systems for level 4, 10 or more for level 5)     Review of Systems   Constitutional: Negative for activity change, appetite change and fatigue. HENT: Negative for congestion and sore throat. Eyes: Positive for visual disturbance. Negative for pain. Respiratory: Negative for chest tightness and shortness of breath. Cardiovascular: Negative for chest pain. Gastrointestinal: Negative for abdominal pain, nausea and vomiting. Endocrine: Negative for polydipsia. Genitourinary: Negative for flank pain and urgency. Musculoskeletal: Negative for gait problem and neck stiffness. Skin: Negative for rash. Neurological: Positive for numbness and headaches. Negative for weakness and light-headedness. Psychiatric/Behavioral: Negative for confusion and sleep disturbance.        Except as noted above the remainder of the review of systems was reviewed and negative. PAST MEDICAL HISTORY     Past Medical History:   Diagnosis Date    ADHD (attention deficit hyperactivity disorder)     Asthma     exercise induced         SURGICAL HISTORY       Past Surgical History:   Procedure Laterality Date    BACK SURGERY      2006    BACK SURGERY  2021    BREAST ENHANCEMENT SURGERY      COSMETIC SURGERY      breast implants    FINGER SURGERY      age 4    LIPOSUCTION           CURRENT MEDICATIONS       Discharge Medication List as of 3/30/2022  6:22 PM      CONTINUE these medications which have NOT CHANGED    Details   pantoprazole (PROTONIX) 40 MG tablet Take 1 tablet by mouth every morning (before breakfast), Disp-30 tablet, R-3Normal      acetaZOLAMIDE (DIAMOX) 250 MG tablet Take 1 tablet by mouth 2 times daily, Disp-60 tablet, R-3Normal      rizatriptan (MAXALT) 5 MG tablet Take 1 tablet by mouth once as needed for Migraine May repeat in 2 hours if needed, Disp-9 tablet, R-5Normal      esomeprazole (NEXIUM) 20 MG delayed release capsule Take 1 capsule by mouth 2 times daily, Disp-60 capsule, R-3Normal      cyclobenzaprine (FLEXERIL) 10 MG tablet Take 10 mg by mouth 2 times daily as needed Historical Med             ALLERGIES     Amoxicillin; Anesthetics, halogenated; Oxycodone;  Oxycodone-acetaminophen; and Topiramate    FAMILY HISTORY       Family History   Problem Relation Age of Onset    Hypertension Mother     No Known Problems Father     Breast Cancer Neg Hx     Cancer Neg Hx     Colon Cancer Neg Hx     Diabetes Neg Hx     Eclampsia Neg Hx     Ovarian Cancer Neg Hx      Labor Neg Hx     Spont Abortions Neg Hx     Stroke Neg Hx           SOCIAL HISTORY       Social History     Socioeconomic History    Marital status: Single     Spouse name: None    Number of children: None    Years of education: None    Highest education level: None   Occupational History    None   Tobacco Use No visual loss  Facial Palsy (4): Normal symmetrical movement  Motor Arm, Left (5a): No drift  Motor Arm, Right (5b): No drift  Motor Leg, Left (6a): No drift  Motor Leg, Right (6b): No drift  Limb Ataxia (7): Absent  Sensory (8): Normal  Best Language (9): No aphasia  Dysarthria (10): Normal  Extinction and Inattention (11): No abnormality  Total: 0    Mya Coma Scale  Eye Opening: Spontaneous  Best Verbal Response: Oriented  Best Motor Response: Obeys commands  Miles Coma Scale Score: 15               PHYSICAL EXAM    (up to 7 for level 4, 8 or more for level 5)     ED Triage Vitals [03/30/22 1631]   BP Temp Temp Source Pulse Resp SpO2 Height Weight   126/88 98.6 °F (37 °C) Oral 93 15 98 % 5' 2\" (1.575 m) 160 lb (72.6 kg)       Physical Exam  Vitals and nursing note reviewed. Constitutional:       General: She is not in acute distress. Appearance: She is well-developed. She is not diaphoretic. HENT:      Head: Normocephalic and atraumatic. Right Ear: External ear normal.      Left Ear: External ear normal.      Mouth/Throat:      Pharynx: No oropharyngeal exudate. Eyes:      Extraocular Movements: Extraocular movements intact. Conjunctiva/sclera: Conjunctivae normal.      Pupils: Pupils are equal, round, and reactive to light. Neck:      Thyroid: No thyromegaly. Vascular: No JVD. Trachea: No tracheal deviation. Cardiovascular:      Rate and Rhythm: Normal rate and regular rhythm. Heart sounds: Normal heart sounds. No murmur heard. Pulmonary:      Effort: Pulmonary effort is normal. No respiratory distress. Breath sounds: Normal breath sounds. No wheezing. Abdominal:      General: Bowel sounds are normal.      Palpations: Abdomen is soft. Tenderness: There is no abdominal tenderness. There is no guarding. Musculoskeletal:         General: Normal range of motion. Cervical back: Normal range of motion and neck supple.    Skin:     General: Skin is warm and dry. Findings: No rash. Neurological:      General: No focal deficit present. Mental Status: She is alert and oriented to person, place, and time. Cranial Nerves: No cranial nerve deficit. Psychiatric:         Behavior: Behavior normal.         DIAGNOSTIC RESULTS     EKG: All EKG's are interpreted by the Emergency Department Physician who either signs or Co-signs this chart in the absence of a cardiologist.        RADIOLOGY:   Non-plain film images such as CT, Ultrasound and MRI are read by the radiologist. Plain radiographic images are visualized and preliminarily interpreted by the emergency physician with the below findings:        Interpretation per the Radiologist below, if available at the time of this note:    CT Head WO Contrast   Final Result   Impression:      Negative CT of the brain. All CT scans at this facility use dose modulation, iterative reconstruction, and/or weight based dosing when appropriate to reduce radiation dose to as low as reasonably achievable.              ED BEDSIDE ULTRASOUND:   Performed by ED Physician - none    LABS:  Labs Reviewed   URINALYSIS WITH REFLEX TO CULTURE - Abnormal; Notable for the following components:       Result Value    Blood, Urine TRACE (*)     All other components within normal limits   CBC WITH AUTO DIFFERENTIAL - Abnormal; Notable for the following components:    RBC 4.17 (*)     All other components within normal limits   MAGNESIUM - Abnormal; Notable for the following components:    Magnesium 2.5 (*)     All other components within normal limits   COMPREHENSIVE METABOLIC PANEL - Abnormal; Notable for the following components:    Chloride 110 (*)     Globulin 2.2 (*)     All other components within normal limits   MICROSCOPIC URINALYSIS - Abnormal; Notable for the following components:    Bacteria, UA RARE (*)     All other components within normal limits   POCT GLUCOSE - Normal   POCT CREATININE - URINE - Normal   LACTIC ACID   ETHANOL   URINE DRUG SCREEN   TSH WITH REFLEX   POC PREGNANCY UR-QUAL   POCT GLUCOSE   POCT VENOUS       All other labs were within normal range or not returned as of this dictation. EMERGENCY DEPARTMENT COURSE and DIFFERENTIAL DIAGNOSIS/MDM:   Vitals:    Vitals:    03/30/22 1630 03/30/22 1631 03/30/22 1700   BP: 126/88 126/88 102/68   Pulse:  93    Resp:  15    Temp:  98.6 °F (37 °C)    TempSrc:  Oral    SpO2:  98% 100%   Weight:  160 lb (72.6 kg)    Height:  5' 2\" (1.575 m)        Patient came in with atypical migraine. After IV medications migraine much improved    MDM      REASSESSMENT          CRITICAL CARE TIME   Total Critical Care time was 0 minutes, excluding separately reportable procedures. There was a high probability of clinically significant/life threatening deterioration in the patient's condition which required my urgent intervention. CONSULTS:  None    PROCEDURES:  Unless otherwise noted below, none     Procedures        FINAL IMPRESSION      1. Complicated migraine          DISPOSITION/PLAN   DISPOSITION Decision To Discharge 03/30/2022 06:15:17 PM      PATIENT REFERRED TO:  Bianca Tong MD  [address] [place] [address] A  [address] [place] [address]  [phone]            DISCHARGE MEDICATIONS:  Discharge Medication List as of 3/30/2022  6:22 PM      START taking these medications    Details   metoclopramide (REGLAN) 10 MG tablet Take 1 tablet by mouth 4 times daily as needed (headache), Disp-30 tablet, R-2Normal           Controlled Substances Monitoring:     No flowsheet data found.     (Please note that portions of this note were completed with a voice recognition program.  Efforts were made to edit the dictations but occasionally words are mis-transcribed.)    Kenneth Argueta DO (electronically signed)  Attending Emergency Physician            Kenneth Argueta DO  04/01/22 [id number]

## 2022-03-30 NOTE — TELEPHONE ENCOUNTER
Patient called stated that she has had loss of vision that started today with severe headache. She has a history of pseudotumor cerebri. Per discussion with Dr. Mala Candelaria, patient advised to report to ED at [facility] for evaluation.

## 2022-03-30 NOTE — ED TRIAGE NOTES
Pt to the ED via walk into triage after mother dropped her off with c/o loss of sight and or change in eye sight. Pt states that her peripheral vision went out and tunneled to the front and became fuzzy and overlapping. Pt ambulated to room from triage with no problems and to bathroom to provide urine specimen with no problems. Pt is alert and oriented x4 and states that she also has a little bit of a headache. Pt is a [facility] patient and has hx of pseudo-tumor that she is being treated for.

## 2022-04-01 NOTE — TELEPHONE ENCOUNTER
Patient did go to ER, states she is feeling better but they advised for her to follow up with office. She is scheduled for an appointment on 5/6/2022-did you want to see her sooner? Also advised to get labs done prior to appt that were ordered at last visit. Wants to know if she can get the migraine cocktail that was given in ER once a month or so as needed? Please advise. Thanks!

## 2022-04-03 NOTE — TELEPHONE ENCOUNTER
We cannot do a migraine cocktail that they gave her as this is IV we can put her on Vyepti infusion every 3 months

## 2022-04-04 NOTE — TELEPHONE ENCOUNTER
Ok for Holli Ruddy, I do not have her B12 results but we can prescribe her B12 1000 mcg daily but she will require follow-up B12 level in 6 weeks

## 2022-04-04 NOTE — TELEPHONE ENCOUNTER
Pt states that she is willing to try the vyepti. Pt also states that in her recnt lab work she had at the hospital she had concerns of her B12 being low and wants to know if she should do b12 injections.

## 2022-04-05 DIAGNOSIS — H47.10 PAPILLEDEMA: ICD-10-CM

## 2022-04-05 LAB
ANION GAP SERPL CALCULATED.3IONS-SCNC: 14 MEQ/L (ref 9–15)
BUN BLDV-MCNC: 13 MG/DL (ref 6–20)
C-REACTIVE PROTEIN: <3 MG/L (ref 0–5)
CALCIUM SERPL-MCNC: 9 MG/DL (ref 8.5–9.9)
CHLORIDE BLD-SCNC: 109 MEQ/L (ref 95–107)
CO2: 17 MEQ/L (ref 20–31)
CREAT SERPL-MCNC: 0.67 MG/DL (ref 0.5–0.9)
GFR AFRICAN AMERICAN: >60
GFR NON-AFRICAN AMERICAN: >60
GLUCOSE BLD-MCNC: 87 MG/DL (ref 70–99)
POTASSIUM SERPL-SCNC: 4.3 MEQ/L (ref 3.4–4.9)
SEDIMENTATION RATE, ERYTHROCYTE: 8 MM (ref 0–20)
SODIUM BLD-SCNC: 140 MEQ/L (ref 135–144)

## 2022-04-05 RX ORDER — SODIUM CHLORIDE 0.9 % (FLUSH) 0.9 %
5-40 SYRINGE (ML) INJECTION PRN
Status: CANCELLED | OUTPATIENT
Start: 2022-04-05

## 2022-04-06 LAB
FOLATE: 9.6 NG/ML
VITAMIN B-12: 596 PG/ML (ref 232–1245)

## 2022-04-07 LAB
ANTICARDIOLIPIN IGA ANTIBODY: <10 APL
ANTICARDIOLIPIN IGG ANTIBODY: <10 GPL
CARDIOLIPIN AB IGM: 23 MPL

## 2022-04-09 LAB — DOUBLE STRANDED DNA AB, IGG: 2 IU (ref 0–24)

## 2022-05-06 ENCOUNTER — OFFICE VISIT (OUTPATIENT)
Dept: NEUROLOGY | Age: 37
End: 2022-05-06
Payer: COMMERCIAL

## 2022-05-06 VITALS
BODY MASS INDEX: 30.73 KG/M2 | HEART RATE: 117 BPM | WEIGHT: 168 LBS | SYSTOLIC BLOOD PRESSURE: 118 MMHG | DIASTOLIC BLOOD PRESSURE: 70 MMHG

## 2022-05-06 DIAGNOSIS — G44.86 CERVICOGENIC HEADACHE: Primary | ICD-10-CM

## 2022-05-06 DIAGNOSIS — Z98.890 S/P LUMBAR LAMINECTOMY: ICD-10-CM

## 2022-05-06 DIAGNOSIS — H47.10 PAPILLEDEMA: ICD-10-CM

## 2022-05-06 DIAGNOSIS — M54.16 LUMBAR RADICULOPATHY: ICD-10-CM

## 2022-05-06 DIAGNOSIS — G43.011 INTRACTABLE MIGRAINE WITHOUT AURA AND WITH STATUS MIGRAINOSUS: ICD-10-CM

## 2022-05-06 PROCEDURE — 4004F PT TOBACCO SCREEN RCVD TLK: CPT | Performed by: PSYCHIATRY & NEUROLOGY

## 2022-05-06 PROCEDURE — G8428 CUR MEDS NOT DOCUMENT: HCPCS | Performed by: PSYCHIATRY & NEUROLOGY

## 2022-05-06 PROCEDURE — 99214 OFFICE O/P EST MOD 30 MIN: CPT | Performed by: PSYCHIATRY & NEUROLOGY

## 2022-05-06 PROCEDURE — G8417 CALC BMI ABV UP PARAM F/U: HCPCS | Performed by: PSYCHIATRY & NEUROLOGY

## 2022-05-06 RX ORDER — TIZANIDINE 4 MG/1
4 TABLET ORAL NIGHTLY
Qty: 30 TABLET | Refills: 3 | Status: SHIPPED | OUTPATIENT
Start: 2022-05-06 | End: 2022-08-05 | Stop reason: ALTCHOICE

## 2022-05-06 RX ORDER — BUTALBITAL, ACETAMINOPHEN AND CAFFEINE 50; 325; 40 MG/1; MG/1; MG/1
TABLET ORAL
COMMUNITY
Start: 2022-01-28 | End: 2022-10-18 | Stop reason: ALTCHOICE

## 2022-05-06 RX ORDER — SUMATRIPTAN 50 MG/1
TABLET, FILM COATED ORAL
COMMUNITY
Start: 2022-03-11 | End: 2022-08-12

## 2022-05-06 ASSESSMENT — ENCOUNTER SYMPTOMS
CHOKING: 0
TROUBLE SWALLOWING: 0
NAUSEA: 0
BACK PAIN: 0
COLOR CHANGE: 0
VOMITING: 0
SHORTNESS OF BREATH: 0
PHOTOPHOBIA: 0

## 2022-05-06 NOTE — PROGRESS NOTES
Subjective:      Patient ID: Jeff Pandya is a 40 y.o. female who presents today for:  Chief Complaint   Patient presents with    Follow-up     Pt states that sheh was in the er recently due to having a migraine so bad it took her vision away. She says that she has been having them everyday now, she says they are lasting mostly all day long. She says none of the medications she is on for them are helping her with them. HPI 41-year right-handed female with a history of pseudotumor cerebri,lumbar  radiculopathy with chronic migraine headaches. Patient reports that she was in the emergency room with a bad migraine headache with visual difficulties. Patient is now having headaches on a daily basis no medication is helping. Patient still is on acetazolamide. Patient in the interim was referred to [address] [place] [address] [id number] and was seen by Dr. Karen Neves and a detailed report was generated from there which I reviewed. Patient does not have papilledema. Her entire eye evaluations are normal.  When she is seen in the emergency room she continued to complain of visual blurring and headaches. The headaches appears to be diffuse headaches more in the neck on the top with some nausea and she has more than 15 headache days a month some of them appear to be migrainous. We will try to therefore obtain medications for migraine which she was not able to obtain. In any case her headaches appear to be relentless on a daily basis.     Past Medical History:   Diagnosis Date    ADHD (attention deficit hyperactivity disorder)     Asthma     exercise induced     Past Surgical History:   Procedure Laterality Date    BACK SURGERY      2006    BACK SURGERY  08/05/2021    BREAST ENHANCEMENT SURGERY      COSMETIC SURGERY      breast implants    FINGER SURGERY      age 3    LIPOSUCTION       Social History     Socioeconomic History    Marital status: Single     Spouse name: Not on file    Number of children: Not on file    Years of education: Not on file    Highest education level: Not on file   Occupational History    Not on file   Tobacco Use    Smoking status: Current Every Day Smoker     Packs/day: 0.50     Years: 19.00     Pack years: 9.50    Smokeless tobacco: Never Used    Tobacco comment: started age 13   Vaping Use    Vaping Use: Never used   Substance and Sexual Activity    Alcohol use: Yes     Alcohol/week: 0.0 standard drinks     Comment: ocassionally    Drug use: No    Sexual activity: Never   Other Topics Concern    Not on file   Social History Narrative    Not on file     Social Determinants of Health     Financial Resource Strain: Medium Risk    Difficulty of Paying Living Expenses: Somewhat hard   Food Insecurity: No Food Insecurity    Worried About Running Out of Food in the Last Year: Never true    Phill of Food in the Last Year: Never true   Transportation Needs: No Transportation Needs    Lack of Transportation (Medical): No    Lack of Transportation (Non-Medical):  No   Physical Activity:     Days of Exercise per Week: Not on file    Minutes of Exercise per Session: Not on file   Stress:     Feeling of Stress : Not on file   Social Connections:     Frequency of Communication with Friends and Family: Not on file    Frequency of Social Gatherings with Friends and Family: Not on file    Attends Voodoo Services: Not on file    Active Member of [address] [place] [address] or Organizations: Not on file    Attends Club or Organization Meetings: Not on file    Marital Status: Not on file   Intimate Partner Violence:     Fear of Current or Ex-Partner: Not on file    Emotionally Abused: Not on file    Physically Abused: Not on file    Sexually Abused: Not on file   Housing Stability:     Unable to Pay for Housing in the Last Year: Not on file    Number of [place] in the Last Year: Not on file    Unstable Housing in the Last Year: Not on file     Family History   Problem Relation Age of Onset    Hypertension Mother     No Known Problems Father     Breast Cancer Neg Hx     Cancer Neg Hx     Colon Cancer Neg Hx     Diabetes Neg Hx     Eclampsia Neg Hx     Ovarian Cancer Neg Hx      Labor Neg Hx     Spont Abortions Neg Hx     Stroke Neg Hx      Allergies   Allergen Reactions    Amoxicillin Hives    Anesthetics, Halogenated      Hard to come out of it, hyperventilating, convulsion, nonresponsive    Oxycodone Nausea Only    Tramadol     Oxycodone-Acetaminophen Hives and Nausea And Vomiting    Topiramate Rash       Current Outpatient Medications   Medication Sig Dispense Refill    butalbital-acetaminophen-caffeine (FIORICET, ESGIC) -40 MG per tablet TAKE 1 TABLET BY MOUTH EVERY 8 HOURS AS NEEDED for headaches      SUMAtriptan (IMITREX) 50 MG tablet Take 1 tablet by mouth once as needed for Migraine      metoclopramide (REGLAN) 10 MG tablet Take 1 tablet by mouth 4 times daily as needed (headache) 30 tablet 2    pantoprazole (PROTONIX) 40 MG tablet Take 1 tablet by mouth every morning (before breakfast) 30 tablet 3    acetaZOLAMIDE (DIAMOX) 250 MG tablet Take 1 tablet by mouth 2 times daily 60 tablet 3    rizatriptan (MAXALT) 5 MG tablet Take 1 tablet by mouth once as needed for Migraine May repeat in 2 hours if needed 9 tablet 5    esomeprazole (NEXIUM) 20 MG delayed release capsule Take 1 capsule by mouth 2 times daily 60 capsule 3    cyclobenzaprine (FLEXERIL) 10 MG tablet Take 10 mg by mouth 2 times daily as needed        No current facility-administered medications for this visit. Review of Systems   Constitutional: Negative for fever. HENT: Negative for ear pain, tinnitus and trouble swallowing. Eyes: Positive for visual disturbance. Negative for photophobia. Respiratory: Negative for choking and shortness of breath. Cardiovascular: Negative for chest pain and palpitations. Gastrointestinal: Negative for nausea and vomiting. Musculoskeletal: Negative for back pain, gait problem, joint swelling, myalgias, neck pain and neck stiffness. Skin: Negative for color change. Allergic/Immunologic: Negative for food allergies. Neurological: Positive for headaches. Negative for dizziness, tremors, seizures, syncope, facial asymmetry, speech difficulty, weakness, light-headedness and numbness. Psychiatric/Behavioral: Negative for behavioral problems, confusion, hallucinations and sleep disturbance. Objective:   /70 (Site: Left Upper Arm, Position: Sitting, Cuff Size: Medium Adult)   Pulse 117   Wt 168 lb (76.2 kg)   BMI 30.73 kg/m²     Physical Exam  Vitals reviewed. Eyes:      Pupils: Pupils are equal, round, and reactive to light. Cardiovascular:      Rate and Rhythm: Normal rate and regular rhythm. Heart sounds: No murmur heard. Pulmonary:      Effort: Pulmonary effort is normal.      Breath sounds: Normal breath sounds. Abdominal:      General: Bowel sounds are normal.   Musculoskeletal:         General: Normal range of motion. Cervical back: Normal range of motion. Skin:     General: Skin is warm. Neurological:      Mental Status: She is alert and oriented to person, place, and time. Cranial Nerves: No cranial nerve deficit. Sensory: No sensory deficit. Motor: No abnormal muscle tone. Coordination: Coordination normal.      Deep Tendon Reflexes: Reflexes are normal and symmetric. Babinski sign absent on the right side. Babinski sign absent on the left side. Psychiatric:         Mood and Affect: Mood normal.     Significant tenderness is notable in the trapezius muscles bilaterally. Patient is not myelopathic.    CT Head WO Contrast    Result Date: 3/30/2022  CT Brain. Contrast medium:  without contrast.. History:  Visual disturbance. Technical factors: CT imaging of the brain was obtained and formatted as 5 mm contiguous axial images.  2.5 mm contiguous axial images were obtained through the osseous structures. Sagittal and coronal reconstruction obtained during postprocessing. Comparison:  None. Findings: Extra-axial spaces:  Normal. Intracranial hemorrhage:  None. Ventricular system: [Negative. Basal Cisterns:  Without anomaly. Cerebral Parenchyma: Without anomaly. Midline Shift:  None. Cerebellum:  No anomaly identified. Paranasal sinuses and mastoid air cells:  No anomaly identified. Visualized Orbits:  Negative. Impression: Negative CT of the brain. All CT scans at this facility use dose modulation, iterative reconstruction, and/or weight based dosing when appropriate to reduce radiation dose to as low as reasonably achievable. Lab Results   Component Value Date    WBC 6.9 03/30/2022    RBC 4.17 03/30/2022    HGB 12.5 03/30/2022    HCT 37.3 03/30/2022    MCV 89.6 03/30/2022    MCH 30.0 03/30/2022    MCHC 33.5 03/30/2022    RDW 14.3 03/30/2022     03/30/2022     Lab Results   Component Value Date     04/05/2022    K 4.3 04/05/2022     04/05/2022    CO2 17 04/05/2022    BUN 13 04/05/2022    CREATININE 0.67 04/05/2022    GFRAA >60.0 04/05/2022    LABGLOM >60.0 04/05/2022    GLUCOSE 87 04/05/2022    PROT 6.8 03/30/2022    LABALBU 4.6 03/30/2022    CALCIUM 9.0 04/05/2022    BILITOT <0.2 03/30/2022    ALKPHOS 82 03/30/2022    AST 17 03/30/2022    ALT 12 03/30/2022     Lab Results   Component Value Date    PROTIME 14.0 11/08/2021    INR 1.1 11/08/2021     Lab Results   Component Value Date    BUDJOBJO32 596 04/05/2022    FOLATE 9.6 04/05/2022     No results found for: TRIG, HDL, LDLCALC, LDLDIRECT, LABVLDL  Lab Results   Component Value Date    LABAMPH Neg 03/30/2022    BARBSCNU Neg 03/30/2022    LABBENZ Neg 03/30/2022    LABMETH Neg 03/30/2022    OPIATESCREENURINE Neg 03/30/2022    PHENCYCLIDINESCREENURINE Neg 03/30/2022    ETOH <10 03/30/2022     No results found for: LITHIUM, DILFRTOT, VALPROATE    Assessment:       Diagnosis Orders   1.  Intractable migraine without aura and with status migrainosus     2. Cervicogenic headache     3. Lumbar radiculopathy     4. S/P lumbar laminectomy     Cervicogenic headaches from the clinical examination with intermittent migraine headaches. Patient since last seen continues to have continued headaches and some of them may be migrainous in nature a dn more then 15 headache days a month. We therefore had recommended Vyepti infusion and we have submitted. In the past we have had some difficulty obtaining other injectables such as CGRP blockers. Patient had called us with a diffuse headache and therefore we have obtained a repeat CT scan of the head which is normal as well. Her eye examination was admitted from [facility] by [place] and this does not show any abnormal eye findings and there is no papilledema. I discussed this in detail with her and a full report was read. At this point in time recommended discontinuation of acetazolamide. Not quite sure if this already responded to the medication and will follow this in a few months with a repeat examination. No further recommendations were performed from ophthalmology. Patient's headaches appears to now become chronic daily headaches and recommended that we subjected to physical therapy heat and ice treatments further helps with the muscle relaxant and also recommended we refer her to a headache clinic given that we are not able to have much response from the medication that we have. Recommended that we start her on Herminio Seton for the migraine headaches that she has intermittent in between the chronic headaches which may be vascular in more than 15 headache days a month. We will awaiting her pre-CERT for Vyepti to see if that would help. As noted when last seen there appeared to be suggestion of papilledema and therefore lumbar puncture was done though CSF pressures were low.   She may have started a lower CSF pressures and therefore it went down from 25. To 6   It is likely that she may have started with lower pressures already and underlying hypotensive headaches may be seen with lower intracranial pressures as well. Can be worsened with lumbar punctures though she had not seen any major changes with this. We have tried her on Maxalt and she is on Fioricet. I did not have any access to her initial eye examination. This appears to be reports Trump notes seen by my NP that she was called by ophthalmology regarding the papilledema. We had therefore sent her to a another ophthalmologist and the review is as noted above    Still has ongoing lumbar issues after laminectomy. Plan:      No orders of the defined types were placed in this encounter. No orders of the defined types were placed in this encounter. No follow-ups on file.       Milady Villarreal MD

## 2022-05-10 ENCOUNTER — TELEPHONE (OUTPATIENT)
Dept: NEUROLOGY | Age: 37
End: 2022-05-10

## 2022-05-10 DIAGNOSIS — R21 RASH: Primary | ICD-10-CM

## 2022-05-10 NOTE — TELEPHONE ENCOUNTER
Spoke with patient she is worried about just going on medication to raise her blood pressure so I told her if she can gain access to a BP cuff and can keep track of it 2 times a day for a few weeks and let us know what the readings are we can give them to you and you could possible come up with a medication that will not increase her to much. She is worried that it is going to effect her heart just going on medication and she had problems with her pregnancy when her BP did rise so she doesn't want it to go to high. She is also asking about the purple/ red splotches if there is anything that can be done for this or if you knew what was possibly causing this?

## 2022-05-10 NOTE — TELEPHONE ENCOUNTER
Pt states that she was supposed to be referred to Headache clinic. She would like to get the results from her labs. She states that she is still getting the purple/red splotches and then she is getting busing. She states that her BP is typically low and she is wonder if there is a cause for this and she is wondering if there is test that need to be ran. She states that the spinal tap gave her relief and the medical staff told her it hindered her.

## 2022-05-10 NOTE — TELEPHONE ENCOUNTER
Patient's labs from April are normal.  There is no one because of low blood pressure and she should check with her primary care doctor for the same. On most occasions this is dehydration and low salt. She can try more salt and if not we can give her medicines to raise her blood pressure to see if it helps her headache or not. Please also forward a referral to a headache clinic.

## 2022-05-12 NOTE — TELEPHONE ENCOUNTER
I am not sure what they are as I did check for Inflammation markers,  and they are negative, The BP meds I am going to give will be PRN if SBP less then 100, and won't effect heart. Might help headache, its just a try.  I can send for some more Rheumatolgogy w/u  if she wants bu tI am not sure what they are,

## 2022-05-13 NOTE — TELEPHONE ENCOUNTER
Midodrine 5 mg to be taken once a day as needed if systolic blood pressure less than 100.   Patient must monitor her blood pressures for this please pend me midodrine 5 mg once a day as needed

## 2022-05-17 RX ORDER — MIDODRINE HYDROCHLORIDE 5 MG/1
5 TABLET ORAL PRN
Qty: 90 TABLET | Refills: 2 | Status: SHIPPED | OUTPATIENT
Start: 2022-05-17 | End: 2022-10-18 | Stop reason: ALTCHOICE

## 2022-05-27 ENCOUNTER — TELEPHONE (OUTPATIENT)
Dept: NEUROLOGY | Age: 37
End: 2022-05-27

## 2022-05-27 DIAGNOSIS — R21 RASH: Primary | ICD-10-CM
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
## 2022-05-27 NOTE — TELEPHONE ENCOUNTER
MIKE with referral office to check on status of referral to headache clinic that was sent in on 5/11/22-Pt states she has not heard from them.

## 2022-05-31 NOTE — TELEPHONE ENCOUNTER
The episodes that she describes are not quite related to the presumed diagnosis of pseudotumor cerebri. I am at a loss as to what these are and truly feel that these are related to underlying anxiety. We will refer her for neuropsychometric testing to see if there is anything underneath. Given that she has Multiple symptoms not related to what I am seeing her for and I am at a loss,  she may want to seek another opinion from [facility] neurologist as I am not quite sure what this all is and it would be for her best interest to have an opinion as I am not helping her in this situation. Follow-up blood work you she should. To her primary care doctor and see what would be the best option for a referral.  Some of this are best managed by primary care than myself.

## 2022-06-01 NOTE — TELEPHONE ENCOUNTER
HARRISON for patient to call . Torri Nunez Referral was faxed to [facility] for neuro psych testing and referral was faxed to Dr. Cordell Gonzales for rheumatology .  Also let her know to call PCP to follow up with labs

## 2022-07-26 ENCOUNTER — TELEPHONE (OUTPATIENT)
Dept: FAMILY MEDICINE CLINIC | Age: 37
End: 2022-07-26

## 2022-07-26 NOTE — TELEPHONE ENCOUNTER
Spoke with patient & scheduled f/u w/Dr. Debi Valle.  Asked patient to contact PT & ask that they send their notes & recommendations over as Dr. Tanya Canela is the one who ordered patient to go to PT. done

## 2022-07-26 NOTE — TELEPHONE ENCOUNTER
Patient called because when she was at physical therapy at [facility], they told her that her spine has shifted. Patient states they advised her to ask pcp to order an MRI. Please advise.

## 2022-08-05 ENCOUNTER — OFFICE VISIT (OUTPATIENT)
Dept: FAMILY MEDICINE CLINIC | Age: 37
End: 2022-08-05
Payer: COMMERCIAL

## 2022-08-05 VITALS
HEIGHT: 62 IN | TEMPERATURE: 98.2 F | SYSTOLIC BLOOD PRESSURE: 120 MMHG | WEIGHT: 159 LBS | DIASTOLIC BLOOD PRESSURE: 79 MMHG | OXYGEN SATURATION: 98 % | HEART RATE: 88 BPM | BODY MASS INDEX: 29.26 KG/M2

## 2022-08-05 DIAGNOSIS — Z98.890 STATUS POST LUMBAR LAMINECTOMY: Primary | ICD-10-CM

## 2022-08-05 DIAGNOSIS — M51.26 DISPLACEMENT OF LUMBAR INTERVERTEBRAL DISC WITHOUT MYELOPATHY: ICD-10-CM

## 2022-08-05 DIAGNOSIS — G89.29 CHRONIC LEFT-SIDED LOW BACK PAIN, UNSPECIFIED WHETHER SCIATICA PRESENT: ICD-10-CM

## 2022-08-05 DIAGNOSIS — M54.50 CHRONIC LEFT-SIDED LOW BACK PAIN, UNSPECIFIED WHETHER SCIATICA PRESENT: ICD-10-CM

## 2022-08-05 DIAGNOSIS — K21.9 GASTROESOPHAGEAL REFLUX DISEASE, UNSPECIFIED WHETHER ESOPHAGITIS PRESENT: ICD-10-CM

## 2022-08-05 DIAGNOSIS — R61 EXCESSIVE SWEATING: ICD-10-CM

## 2022-08-05 DIAGNOSIS — T14.8XXA BRUISING: ICD-10-CM

## 2022-08-05 PROCEDURE — G8427 DOCREV CUR MEDS BY ELIG CLIN: HCPCS | Performed by: STUDENT IN AN ORGANIZED HEALTH CARE EDUCATION/TRAINING PROGRAM

## 2022-08-05 PROCEDURE — G8417 CALC BMI ABV UP PARAM F/U: HCPCS | Performed by: STUDENT IN AN ORGANIZED HEALTH CARE EDUCATION/TRAINING PROGRAM

## 2022-08-05 PROCEDURE — 4004F PT TOBACCO SCREEN RCVD TLK: CPT | Performed by: STUDENT IN AN ORGANIZED HEALTH CARE EDUCATION/TRAINING PROGRAM

## 2022-08-05 PROCEDURE — 99215 OFFICE O/P EST HI 40 MIN: CPT | Performed by: STUDENT IN AN ORGANIZED HEALTH CARE EDUCATION/TRAINING PROGRAM

## 2022-08-05 RX ORDER — ATOGEPANT 60 MG/1
TABLET ORAL
COMMUNITY
Start: 2022-06-13

## 2022-08-05 RX ORDER — CYCLOBENZAPRINE HCL 10 MG
10 TABLET ORAL 2 TIMES DAILY PRN
Qty: 60 TABLET | Refills: 1 | Status: SHIPPED | OUTPATIENT
Start: 2022-08-05 | End: 2022-09-04

## 2022-08-05 ASSESSMENT — PATIENT HEALTH QUESTIONNAIRE - PHQ9
10. IF YOU CHECKED OFF ANY PROBLEMS, HOW DIFFICULT HAVE THESE PROBLEMS MADE IT FOR YOU TO DO YOUR WORK, TAKE CARE OF THINGS AT HOME, OR GET ALONG WITH OTHER PEOPLE: 0
SUM OF ALL RESPONSES TO PHQ QUESTIONS 1-9: 0
8. MOVING OR SPEAKING SO SLOWLY THAT OTHER PEOPLE COULD HAVE NOTICED. OR THE OPPOSITE, BEING SO FIGETY OR RESTLESS THAT YOU HAVE BEEN MOVING AROUND A LOT MORE THAN USUAL: 0
5. POOR APPETITE OR OVEREATING: 0
6. FEELING BAD ABOUT YOURSELF - OR THAT YOU ARE A FAILURE OR HAVE LET YOURSELF OR YOUR FAMILY DOWN: 0
3. TROUBLE FALLING OR STAYING ASLEEP: 0
9. THOUGHTS THAT YOU WOULD BE BETTER OFF DEAD, OR OF HURTING YOURSELF: 0
SUM OF ALL RESPONSES TO PHQ QUESTIONS 1-9: 0
SUM OF ALL RESPONSES TO PHQ QUESTIONS 1-9: 0
1. LITTLE INTEREST OR PLEASURE IN DOING THINGS: 0
7. TROUBLE CONCENTRATING ON THINGS, SUCH AS READING THE NEWSPAPER OR WATCHING TELEVISION: 0
2. FEELING DOWN, DEPRESSED OR HOPELESS: 0
SUM OF ALL RESPONSES TO PHQ QUESTIONS 1-9: 0
DEPRESSION UNABLE TO ASSESS: PT REFUSES
4. FEELING TIRED OR HAVING LITTLE ENERGY: 0
SUM OF ALL RESPONSES TO PHQ9 QUESTIONS 1 & 2: 0

## 2022-08-05 NOTE — PROGRESS NOTES
Subjective  Saman uBddy, 40 y.o. female presents today with:  Chief Complaint   Patient presents with    Other     Discuss Physical therapy recommendations. Needs back MRI ordered per PT. Back Pain    Back Problem     Back surgery 8/2021    Migraine     Her specialist wanted her to get MRI, needs order     HPI    Patient with appointment today to discuss physical therapy recommendations and low back pain. She has been following with multiple specialists including 2 neurologists, Dr. Nithin Reza and headache specialist [facility]. She has been ordered to have a MRI of the brain. She is waiting for approval of this. She is getting vertigo symptoms and dizziness at times. She states her physical therapy had to stop last week because of the symptoms. Her physical therapist initially recommended repeat MRI imaging but then realized she had had imaging done within the last year. Patient had back surgery in August 2021. She has had issues since that time. She suffers from chronic low back pain. She has also been having debilitating headaches. She is on acetazolamide from neurology. She is also on Maxalt. She is using Flexeril as needed. She has been getting some benefit from physical therapy. Patient also with continued acid reflux. She is using esomeprazole but her insurance wanted a different medication. She has to switch to Protonix. This will be sent. She is taking 40 mg twice daily which does control her acid reflux symptoms. She was previously following with gastroenterology and has not seen them in quite some time. She was getting every 3-year colonoscopies. Requesting new referral to gastroenterology. This will be placed. Patient also with excessive bruising. She states this been going on for the last year or so. Requesting to see hematology. This referral will be placed per patient request.    Patient also with excessive sweating.   She states this been going on the last 6 months or so. All recent labs are within normal limits. She has used Drysol previously. We will plan to resend this for her. Continue to monitor. Follow-up as scheduled. Review of Systems   Constitutional:  Positive for fatigue. Negative for chills, fever and unexpected weight change. HENT:  Negative for congestion, ear pain, postnasal drip, rhinorrhea, sinus pressure, sinus pain and sore throat. Respiratory:  Negative for cough, shortness of breath and wheezing. Cardiovascular:  Negative for chest pain, palpitations and leg swelling. Gastrointestinal:  Negative for abdominal pain, diarrhea, nausea and vomiting. Endocrine: Positive for heat intolerance. Genitourinary:  Negative for difficulty urinating. Musculoskeletal:  Positive for back pain. Negative for arthralgias and joint swelling. Skin:  Negative for rash. Neurological:  Negative for weakness, light-headedness, numbness and headaches. Past Medical History:   Diagnosis Date    ADHD (attention deficit hyperactivity disorder)     Asthma     exercise induced     Past Surgical History:   Procedure Laterality Date    BACK SURGERY      2006    BACK SURGERY  08/05/2021    BREAST ENHANCEMENT SURGERY      COSMETIC SURGERY      breast implants    FINGER SURGERY      age 3    LIPOSUCTION       Current Outpatient Medications   Medication Sig Dispense Refill    pantoprazole (PROTONIX) 40 MG tablet Take 1 tablet by mouth in the morning and 1 tablet in the evening. Take before meals. 180 tablet 1    aluminum chloride (DRYSOL) 20 % external solution Apply topically nightly.  60 mL 1    cyclobenzaprine (FLEXERIL) 10 MG tablet Take 1 tablet by mouth 2 times daily as needed for Muscle spasms 60 tablet 1    midodrine (PROAMATINE) 5 MG tablet Take 1 tablet by mouth as needed (take is systolic is less then 942) 90 tablet 2    butalbital-acetaminophen-caffeine (FIORICET, ESGIC) -40 MG per tablet TAKE 1 TABLET BY MOUTH EVERY 8 HOURS AS NEEDED for Behavior: Behavior normal.      Assessment & Plan     1. Status post lumbar laminectomy  Patient with ongoing low back pain with history of laminectomy and lumbar disc slip and chronic pain from this. She is using Flexeril as needed. Prescription sent to the pharmacy. She is following with multiple specialists for this. She is having a lot of pain associated with it. She has been doing physical therapy. Did talk with physical therapist who stated that since she has recent imaging there is no need for further MRI imaging. We will continue to monitor. Follow-up as scheduled. Keep all appointments with specialist.  Continue with therapy. All questions answered. Follow-up as scheduled. 2. Displacement of lumbar intervertebral disc without myelopathy  As above    3. Chronic left-sided low back pain, unspecified whether sciatica present  As above  - cyclobenzaprine (FLEXERIL) 10 MG tablet; Take 1 tablet by mouth 2 times daily as needed for Muscle spasms  Dispense: 60 tablet; Refill: 1    4. Gastroesophageal reflux disease, unspecified whether esophagitis present  Patient with ongoing acid reflux symptoms. We had to switch from esomeprazole to pantoprazole. She is taking 40 mg twice daily. New prescription sent to the pharmacy. Also place referral to GI as she has not seen them in quite some time. Patient voiced understanding. All questions answered. Follow-up scheduled. - pantoprazole (PROTONIX) 40 MG tablet; Take 1 tablet by mouth in the morning and 1 tablet in the evening. Take before meals. Dispense: 180 tablet; Refill: 1  - Flor Pemberton MD, Gastroenterology, [facility]    5. Bruising  Patient with bruising that has been getting worse over the last year. Requesting referral to hematology. This been placed. Follow-up with specialist.  Continue to monitor.  - Cecille Nageotte, MD, Oncology, [facility]    6. Excessive sweating  With excessive sweating. She has used Drysol previously.   Will reorder. Recent labs within normal limits. Continue to monitor. Follow-up as scheduled. - aluminum chloride (DRYSOL) 20 % external solution; Apply topically nightly. Dispense: 60 mL; Refill: 1  Orders Placed This Encounter   Procedures    [facility] - Solomon Bah MD, Oncology, [facility]     Referral Priority:   Routine     Referral Type:   Eval and Treat     Referral Reason:   Specialty Services Required     Referred to Provider:   Nash Buchanan MD     Requested Specialty:   Hematology and Oncology     Number of Visits Requested:   Errol Winkler MD, Gastroenterology, [facility]     Referral Priority:   Routine     Referral Type:   Eval and Treat     Referral Reason:   Specialty Services Required     Referred to Provider:   Jessie Swanson MD     Requested Specialty:   Gastroenterology     Number of Visits Requested:   1       Orders Placed This Encounter   Medications    cyclobenzaprine (FLEXERIL) 10 MG tablet     Sig: Take 1 tablet by mouth 2 times daily as needed for Muscle spasms     Dispense:  60 tablet     Refill:  1    pantoprazole (PROTONIX) 40 MG tablet     Sig: Take 1 tablet by mouth in the morning and 1 tablet in the evening. Take before meals. Dispense:  180 tablet     Refill:  1    aluminum chloride (DRYSOL) 20 % external solution     Sig: Apply topically nightly. Dispense:  60 mL     Refill:  1       Medications Discontinued During This Encounter   Medication Reason    tiZANidine (ZANAFLEX) 4 MG tablet Therapy completed    cyclobenzaprine (FLEXERIL) 10 MG tablet REORDER    esomeprazole (NEXIUM) 20 MG delayed release capsule Alternate therapy     Return in about 2 months (around 10/5/2022) for follow up. Reviewed with the patient: current clinical status,medications, activities and diet. 45 minutes spent talking with patient and reviewing chart.   Side effects, adverse effects of themedication prescribed today, as well as treatment plan/ rationale and result expectations have been discussed with the patient who expresses understanding and desires to proceed. Close follow up to evaluate treatment results and for coordination of care. I have reviewed the patient's medical history in detail and updated the computerized patient record. Please note, this report has been partially produced using speech recognition software and may cause  and /or contain errors related to that system including grammar, punctuation and spelling as well as words and phrases that may seem inappropriate. If there are questions or concerns please feel free to contact me to clarify.     Galilea Marcos, DO

## 2022-08-10 ENCOUNTER — TELEPHONE (OUTPATIENT)
Dept: NEUROLOGY | Age: 37
End: 2022-08-10

## 2022-08-10 NOTE — TELEPHONE ENCOUNTER
[facility]uropsych group called and spoke with Dr. Sharon Moeller reguarding referral for Neuropsych testing, discussed referral and will help with referral to Psychology.

## 2022-08-11 ENCOUNTER — TELEPHONE (OUTPATIENT)
Dept: NEUROLOGY | Age: 37
End: 2022-08-11

## 2022-08-11 DIAGNOSIS — G44.52 NEW DAILY PERSISTENT HEADACHE (NDPH): ICD-10-CM

## 2022-08-11 RX ORDER — PANTOPRAZOLE SODIUM 40 MG/1
40 TABLET, DELAYED RELEASE ORAL
Qty: 180 TABLET | Refills: 1 | Status: SHIPPED | OUTPATIENT
Start: 2022-08-11 | End: 2022-09-30

## 2022-08-11 ASSESSMENT — ENCOUNTER SYMPTOMS
RHINORRHEA: 0
SINUS PRESSURE: 0
BACK PAIN: 1
VOMITING: 0
SINUS PAIN: 0
COUGH: 0
WHEEZING: 0
SORE THROAT: 0
ABDOMINAL PAIN: 0
SHORTNESS OF BREATH: 0
NAUSEA: 0
DIARRHEA: 0

## 2022-08-11 NOTE — TELEPHONE ENCOUNTER
Pharmacy is requesting medication refill.  Please approve or deny this request.    Rx requested:  Requested Prescriptions     Pending Prescriptions Disp Refills    acetaZOLAMIDE (DIAMOX) 250 MG tablet [Pharmacy Med Name: acetazolamide 250 mg tablet] 60 tablet 3     Sig: TAKE 1 TABLET BY MOUTH TWICE DAILY         Last Office Visit:   5/6/2022      Next Visit Date:  Future Appointments   Date Time Provider Venkatesh Kirk   9/16/2022 10:00 AM Nancy Shay MD [address] [place] [address]   10/5/2022 10:30 AM Radha Rankin DO [address] [facility]

## 2022-08-11 NOTE — TELEPHONE ENCOUNTER
Pharmacy is requesting medication refill.  Please approve or deny this request.    Rx requested:  Requested Prescriptions     Pending Prescriptions Disp Refills    SUMAtriptan (IMITREX) 50 MG tablet [Pharmacy Med Name: sumatriptan 50 mg tablet] 9 tablet 3     Sig: Take 1 tablet by mouth once as needed for Migraine         Last Office Visit:   10/11/2021      Next Visit Date:  Future Appointments   Date Time Provider Venkatesh Kirk   9/16/2022 10:00 AM Brandt Mills MD [facility]   10/5/2022 10:30 AM Praveen Lara DO [address] [place] [address] 7

## 2022-08-12 RX ORDER — ACETAZOLAMIDE 250 MG/1
TABLET ORAL
Qty: 60 TABLET | Refills: 3 | Status: SHIPPED | OUTPATIENT
Start: 2022-08-12 | End: 2022-10-18 | Stop reason: ALTCHOICE

## 2022-08-12 RX ORDER — SUMATRIPTAN 50 MG/1
TABLET, FILM COATED ORAL
Qty: 9 TABLET | Refills: 3 | Status: SHIPPED | OUTPATIENT
Start: 2022-08-12 | End: 2022-09-30 | Stop reason: ALTCHOICE

## 2022-09-07 PROBLEM — J45.990 EXERCISE-INDUCED ASTHMA: Status: ACTIVE | Noted: 2022-09-07

## 2022-09-07 PROBLEM — G43.011 INTRACTABLE MIGRAINE WITHOUT AURA AND WITH STATUS MIGRAINOSUS: Status: ACTIVE | Noted: 2022-09-07

## 2022-09-07 PROBLEM — R94.6 ABNORMAL FINDING ON THYROID FUNCTION TEST: Status: ACTIVE | Noted: 2022-09-07

## 2022-09-07 PROBLEM — F90.9 ATTENTION DEFICIT HYPERACTIVITY DISORDER (ADHD): Status: ACTIVE | Noted: 2022-09-07

## 2022-09-07 PROBLEM — H43.21 ASTEROID HYALOSIS OF RIGHT EYE: Status: ACTIVE | Noted: 2022-09-07

## 2022-09-07 PROBLEM — N93.9 ABNORMAL UTERINE BLEEDING: Status: ACTIVE | Noted: 2022-09-07

## 2022-09-07 PROBLEM — J20.9 ACUTE BRONCHITIS: Status: ACTIVE | Noted: 2022-09-07

## 2022-09-07 PROBLEM — K21.9 GASTROESOPHAGEAL REFLUX DISEASE: Status: ACTIVE | Noted: 2019-03-25

## 2022-09-07 PROBLEM — G47.00 INSOMNIA: Status: ACTIVE | Noted: 2022-09-07

## 2022-09-07 PROBLEM — H53.19 PHOTOPSIA: Status: ACTIVE | Noted: 2022-09-07

## 2022-09-07 PROBLEM — H47.11 PAPILLEDEMA ASSOCIATED WITH INCREASED INTRACRANIAL PRESSURE: Status: ACTIVE | Noted: 2021-11-29

## 2022-09-23 ENCOUNTER — TELEPHONE (OUTPATIENT)
Dept: NEUROLOGY | Age: 37
End: 2022-09-23

## 2022-09-23 DIAGNOSIS — H47.11 PAPILLEDEMA ASSOCIATED WITH INCREASED INTRACRANIAL PRESSURE: Primary | ICD-10-CM

## 2022-09-23 NOTE — TELEPHONE ENCOUNTER
Pt would like a referral places for Hosea Haynes he is through [facility].   Could you please place referral.

## 2022-09-30 ENCOUNTER — OFFICE VISIT (OUTPATIENT)
Dept: FAMILY MEDICINE CLINIC | Age: 37
End: 2022-09-30
Payer: COMMERCIAL

## 2022-09-30 VITALS
HEIGHT: 62 IN | SYSTOLIC BLOOD PRESSURE: 100 MMHG | OXYGEN SATURATION: 98 % | BODY MASS INDEX: 29.08 KG/M2 | TEMPERATURE: 98.4 F | DIASTOLIC BLOOD PRESSURE: 68 MMHG | HEART RATE: 101 BPM

## 2022-09-30 DIAGNOSIS — M54.50 CHRONIC LEFT-SIDED LOW BACK PAIN, UNSPECIFIED WHETHER SCIATICA PRESENT: Primary | ICD-10-CM

## 2022-09-30 DIAGNOSIS — M51.26 DISPLACEMENT OF LUMBAR INTERVERTEBRAL DISC WITHOUT MYELOPATHY: ICD-10-CM

## 2022-09-30 DIAGNOSIS — Z98.890 STATUS POST LUMBAR LAMINECTOMY: ICD-10-CM

## 2022-09-30 DIAGNOSIS — G89.29 CHRONIC LEFT-SIDED LOW BACK PAIN, UNSPECIFIED WHETHER SCIATICA PRESENT: Primary | ICD-10-CM

## 2022-09-30 PROCEDURE — 4004F PT TOBACCO SCREEN RCVD TLK: CPT | Performed by: STUDENT IN AN ORGANIZED HEALTH CARE EDUCATION/TRAINING PROGRAM

## 2022-09-30 PROCEDURE — G8427 DOCREV CUR MEDS BY ELIG CLIN: HCPCS | Performed by: STUDENT IN AN ORGANIZED HEALTH CARE EDUCATION/TRAINING PROGRAM

## 2022-09-30 PROCEDURE — 99214 OFFICE O/P EST MOD 30 MIN: CPT | Performed by: STUDENT IN AN ORGANIZED HEALTH CARE EDUCATION/TRAINING PROGRAM

## 2022-09-30 PROCEDURE — G8417 CALC BMI ABV UP PARAM F/U: HCPCS | Performed by: STUDENT IN AN ORGANIZED HEALTH CARE EDUCATION/TRAINING PROGRAM

## 2022-09-30 RX ORDER — CYCLOBENZAPRINE HCL 10 MG
TABLET ORAL
COMMUNITY
Start: 2022-09-18 | End: 2022-10-31

## 2022-09-30 RX ORDER — HYDROCODONE BITARTRATE AND ACETAMINOPHEN 5; 325 MG/1; MG/1
1 TABLET ORAL EVERY 6 HOURS PRN
COMMUNITY

## 2022-09-30 RX ORDER — METHYLPREDNISOLONE 4 MG/1
TABLET ORAL
Qty: 21 TABLET | Refills: 0 | Status: SHIPPED | OUTPATIENT
Start: 2022-09-30 | End: 2022-10-06

## 2022-09-30 SDOH — ECONOMIC STABILITY: FOOD INSECURITY: WITHIN THE PAST 12 MONTHS, YOU WORRIED THAT YOUR FOOD WOULD RUN OUT BEFORE YOU GOT MONEY TO BUY MORE.: NEVER TRUE

## 2022-09-30 SDOH — ECONOMIC STABILITY: FOOD INSECURITY: WITHIN THE PAST 12 MONTHS, THE FOOD YOU BOUGHT JUST DIDN'T LAST AND YOU DIDN'T HAVE MONEY TO GET MORE.: NEVER TRUE

## 2022-09-30 ASSESSMENT — SOCIAL DETERMINANTS OF HEALTH (SDOH): HOW HARD IS IT FOR YOU TO PAY FOR THE VERY BASICS LIKE FOOD, HOUSING, MEDICAL CARE, AND HEATING?: NOT HARD AT ALL

## 2022-09-30 NOTE — PROGRESS NOTES
Subjective  Colleensondra Quezada, 40 y.o. female presents today with:  Chief Complaint   Patient presents with    Back Pain     States her back pain has increased. Is having a hard time walking & moving around at all. States her pain is severe. States the pain is from mid to lower back all the way down to her toes on the right side. Is requesting a referral to pain management or something & requesting testing be done. HPI    Patient with appointment today to discuss ongoing back pain. She states her back pain is increased. She is having a hard time walking and moving around at all. She states her pain is severe. She states it is from the mid to low back all the way down to her toes on the right side. She is requesting a referral to pain management. She was previously seeing pain management in [facility]. She is requesting new referral to be placed to her original pain management doctor. No loss of bowel or bladder function. No saddle anesthesia. She has no other concerns at this time. Review of Systems   Constitutional:  Negative for chills, fever and unexpected weight change. HENT:  Negative for congestion, ear pain, postnasal drip, rhinorrhea, sinus pressure, sinus pain and sore throat. Respiratory:  Negative for cough, shortness of breath and wheezing. Cardiovascular:  Negative for chest pain, palpitations and leg swelling. Gastrointestinal:  Negative for abdominal pain, diarrhea, nausea and vomiting. Endocrine: Negative for heat intolerance. Genitourinary:  Negative for difficulty urinating. Musculoskeletal:  Positive for back pain. Negative for arthralgias and joint swelling. Skin:  Negative for rash. Neurological:  Negative for weakness, light-headedness, numbness and headaches.      Past Medical History:   Diagnosis Date    ADHD (attention deficit hyperactivity disorder)     Asthma     exercise induced     Past Surgical History:   Procedure Laterality Date    BACK SURGERY 2006    BACK SURGERY  08/05/2021    BREAST ENHANCEMENT SURGERY      COSMETIC SURGERY      breast implants    FINGER SURGERY      age 3    LIPOSUCTION       Current Outpatient Medications   Medication Sig Dispense Refill    cyclobenzaprine (FLEXERIL) 10 MG tablet TAKE 1 TABLET BY MOUTH TWICE DAILY AS NEEDED FOR MUSCLE SPASMS      HYDROcodone-acetaminophen (NORCO) 5-325 MG per tablet Take 1 tablet by mouth every 6 hours as needed for Pain. methylPREDNISolone (MEDROL DOSEPACK) 4 MG tablet Take by mouth. 21 tablet 0    QULIPTA 60 MG TABS TAKE 1 TABLET BY MOUTH EVERY DAY      metoclopramide (REGLAN) 10 MG tablet Take 1 tablet by mouth 4 times daily as needed (headache) 30 tablet 2    pantoprazole (PROTONIX) 40 MG tablet Take 1 tablet by mouth every morning (before breakfast) 30 tablet 3    acetaZOLAMIDE (DIAMOX) 250 MG tablet TAKE 1 TABLET BY MOUTH TWICE DAILY (Patient not taking: Reported on 9/30/2022) 60 tablet 3    aluminum chloride (DRYSOL) 20 % external solution Apply topically nightly. (Patient not taking: Reported on 9/30/2022) 60 mL 1    midodrine (PROAMATINE) 5 MG tablet Take 1 tablet by mouth as needed (take is systolic is less then 684) (Patient not taking: Reported on 9/30/2022) 90 tablet 2    butalbital-acetaminophen-caffeine (FIORICET, ESGIC) -40 MG per tablet TAKE 1 TABLET BY MOUTH EVERY 8 HOURS AS NEEDED for headaches (Patient not taking: Reported on 9/30/2022)       No current facility-administered medications for this visit. PMH, Surgical Hx, Family Hx, and Social Hx reviewed and updated. Health Maintenance reviewed. Objective    Vitals:    09/30/22 0954   BP: 100/68   Pulse: (!) 101   Temp: 98.4 °F (36.9 °C)   SpO2: 98%   Height: 5' 2\" (1.575 m)       Physical Exam  Vitals reviewed. Constitutional:       General: She is not in acute distress. HENT:      Head: Normocephalic and atraumatic.    Eyes:      Conjunctiva/sclera: Conjunctivae normal.   Cardiovascular:      Rate and Rhythm: Normal rate and regular rhythm. Heart sounds: No murmur heard. No friction rub. No gallop. Pulmonary:      Effort: Pulmonary effort is normal.      Breath sounds: Normal breath sounds. No wheezing, rhonchi or rales. Musculoskeletal:         General: No swelling or deformity. Right lower leg: No edema. Left lower leg: No edema. Comments: Decreased range of motion with cervical spine, thoracic spine and lumbar spine. Lymphadenopathy:      Cervical: No cervical adenopathy. Skin:     General: Skin is warm and dry. Findings: No rash. Neurological:      General: No focal deficit present. Mental Status: She is alert. Gait: Gait is intact. Psychiatric:         Mood and Affect: Mood normal.         Behavior: Behavior normal.          Assessment & Plan       1. Chronic left-sided low back pain, unspecified whether sciatica present  Patient with chronic low back pain following lumbar laminectomy. She is requesting referral to pain management. Referral has been placed. We will also provide Medrol Dosepak to help with this. She is just over a year out from her previous surgery. She is no longer following with the surgeon as she was not getting the results. She is requesting a new referral to pain management. This been placed. Follow-up as scheduled. If symptoms worsen or change, return to care sooner. All questions answered. Follow-up as scheduled. - External Referral to Pain Management  - methylPREDNISolone (MEDROL DOSEPACK) 4 MG tablet; Take by mouth. Dispense: 21 tablet; Refill: 0    2. Status post lumbar laminectomy  As above  - External Referral to Pain Management    3.  Displacement of lumbar intervertebral disc without myelopathy  As above  - External Referral to Pain Management      Orders Placed This Encounter   Procedures    External Referral to Pain Management     Referral Priority:   Routine     Referral Type:   Eval and Treat     Referral Reason: Specialty Services Required     Requested Specialty:   Pain Management     Number of Visits Requested:   1     Orders Placed This Encounter   Medications    methylPREDNISolone (MEDROL DOSEPACK) 4 MG tablet     Sig: Take by mouth. Dispense:  21 tablet     Refill:  0     Medications Discontinued During This Encounter   Medication Reason    pantoprazole (PROTONIX) 40 MG tablet LIST CLEANUP    rizatriptan (MAXALT) 5 MG tablet Therapy completed    SUMAtriptan (IMITREX) 50 MG tablet Therapy completed     Return in about 2 months (around 11/30/2022) for follow up.    30 minutes spent talking with patient and reviewing chart. Reviewed with the patient: current clinical status,medications, activities and diet. Side effects, adverse effects of themedication prescribed today, as well as treatment plan/ rationale and result expectations have been discussed with the patient who expresses understanding and desires to proceed. Close follow up to evaluate treatment results and for coordination of care. I have reviewed the patient's medical history in detail and updated the computerized patient record. Please note, this report has been partially produced using speech recognition software and may cause  and /or contain errors related to that system including grammar, punctuation and spelling as well as words and phrases that may seem inappropriate. If there are questions or concerns please feel free to contact me to clarify.     Marianna Holland, DO

## 2022-10-04 ASSESSMENT — ENCOUNTER SYMPTOMS
DIARRHEA: 0
VOMITING: 0
RHINORRHEA: 0
SINUS PAIN: 0
BACK PAIN: 1
WHEEZING: 0
COUGH: 0
SINUS PRESSURE: 0
SORE THROAT: 0
SHORTNESS OF BREATH: 0
NAUSEA: 0
ABDOMINAL PAIN: 0

## 2022-10-18 ENCOUNTER — TELEPHONE (OUTPATIENT)
Dept: FAMILY MEDICINE CLINIC | Age: 37
End: 2022-10-18

## 2022-10-18 ENCOUNTER — OFFICE VISIT (OUTPATIENT)
Dept: FAMILY MEDICINE CLINIC | Age: 37
End: 2022-10-18
Payer: COMMERCIAL

## 2022-10-18 VITALS
TEMPERATURE: 98.5 F | SYSTOLIC BLOOD PRESSURE: 128 MMHG | WEIGHT: 159.4 LBS | OXYGEN SATURATION: 98 % | DIASTOLIC BLOOD PRESSURE: 82 MMHG | HEART RATE: 99 BPM | HEIGHT: 62 IN | BODY MASS INDEX: 29.33 KG/M2

## 2022-10-18 DIAGNOSIS — R25.2 SPASM: ICD-10-CM

## 2022-10-18 DIAGNOSIS — Z23 NEED FOR INFLUENZA VACCINATION: ICD-10-CM

## 2022-10-18 DIAGNOSIS — R11.0 NAUSEA: ICD-10-CM

## 2022-10-18 DIAGNOSIS — N92.0 MENORRHAGIA WITH REGULAR CYCLE: ICD-10-CM

## 2022-10-18 DIAGNOSIS — R11.0 NAUSEA: Primary | ICD-10-CM

## 2022-10-18 LAB
ALBUMIN SERPL-MCNC: 4.8 G/DL (ref 3.5–4.6)
ALP BLD-CCNC: 79 U/L (ref 40–130)
ALT SERPL-CCNC: 27 U/L (ref 0–33)
ANION GAP SERPL CALCULATED.3IONS-SCNC: 11 MEQ/L (ref 9–15)
AST SERPL-CCNC: 25 U/L (ref 0–35)
BILIRUB SERPL-MCNC: 0.3 MG/DL (ref 0.2–0.7)
BUN BLDV-MCNC: 7 MG/DL (ref 6–20)
CALCIUM SERPL-MCNC: 9.7 MG/DL (ref 8.5–9.9)
CHLORIDE BLD-SCNC: 103 MEQ/L (ref 95–107)
CO2: 26 MEQ/L (ref 20–31)
CREAT SERPL-MCNC: 0.51 MG/DL (ref 0.5–0.9)
GFR SERPL CREATININE-BSD FRML MDRD: >60 ML/MIN/{1.73_M2}
GLOBULIN: 2.7 G/DL (ref 2.3–3.5)
GLUCOSE FASTING: 96 MG/DL (ref 70–99)
MAGNESIUM: 2.3 MG/DL (ref 1.7–2.4)
POTASSIUM SERPL-SCNC: 3.9 MEQ/L (ref 3.4–4.9)
SODIUM BLD-SCNC: 140 MEQ/L (ref 135–144)
TOTAL PROTEIN: 7.5 G/DL (ref 6.3–8)

## 2022-10-18 PROCEDURE — 99214 OFFICE O/P EST MOD 30 MIN: CPT | Performed by: STUDENT IN AN ORGANIZED HEALTH CARE EDUCATION/TRAINING PROGRAM

## 2022-10-18 PROCEDURE — 90471 IMMUNIZATION ADMIN: CPT | Performed by: STUDENT IN AN ORGANIZED HEALTH CARE EDUCATION/TRAINING PROGRAM

## 2022-10-18 PROCEDURE — 4004F PT TOBACCO SCREEN RCVD TLK: CPT | Performed by: STUDENT IN AN ORGANIZED HEALTH CARE EDUCATION/TRAINING PROGRAM

## 2022-10-18 PROCEDURE — G8482 FLU IMMUNIZE ORDER/ADMIN: HCPCS | Performed by: STUDENT IN AN ORGANIZED HEALTH CARE EDUCATION/TRAINING PROGRAM

## 2022-10-18 PROCEDURE — G8417 CALC BMI ABV UP PARAM F/U: HCPCS | Performed by: STUDENT IN AN ORGANIZED HEALTH CARE EDUCATION/TRAINING PROGRAM

## 2022-10-18 PROCEDURE — 90674 CCIIV4 VAC NO PRSV 0.5 ML IM: CPT | Performed by: STUDENT IN AN ORGANIZED HEALTH CARE EDUCATION/TRAINING PROGRAM

## 2022-10-18 PROCEDURE — G8427 DOCREV CUR MEDS BY ELIG CLIN: HCPCS | Performed by: STUDENT IN AN ORGANIZED HEALTH CARE EDUCATION/TRAINING PROGRAM

## 2022-10-18 RX ORDER — ETONOGESTREL 68 MG/1
68 IMPLANT SUBCUTANEOUS ONCE
Qty: 1 EACH | Refills: 0 | Status: SHIPPED | OUTPATIENT
Start: 2022-10-18 | End: 2022-11-21

## 2022-10-18 RX ORDER — GABAPENTIN 300 MG/1
300 CAPSULE ORAL 3 TIMES DAILY
COMMUNITY
Start: 2022-10-14 | End: 2022-11-07 | Stop reason: SDUPTHER

## 2022-10-18 NOTE — PROGRESS NOTES
Vaccine Information Sheet, \"Influenza - Inactivated\"  given to Shanda Art, or parent/legal guardian of  Shanda Art and verbalized understanding. Patient responses:    Have you ever had a reaction to a flu vaccine? No  Are you able to eat eggs without adverse effects? Yes  Do you have any current illness? No  Have you ever had Guillian [place] Syndrome? No    Flu vaccine given per order. Please see immunization tab. Patient does not exhibit any risk factors

## 2022-10-18 NOTE — PROGRESS NOTES
Subjective  Mann Morales, 40 y.o. female presents today with:  Chief Complaint   Patient presents with    Nausea     Is concerned she may of had a \"stroke\" on Friday following an appointment with her pain management Dr. Palma Babinski when she left she became nauseous, sweaty & pale. States she then developed left arm pain & numbness. States her hand jolene up & she wasn't able to use the arm & was not able to use her right hand either. States she was unable to talk to make a phone call on her car phone. States when she looked in the mirror it appeared her mouth was drooping. Is unsure how she made it home & then to bed. HPI    Patient with acute appointment today to discuss neurologic symptoms that occurred over the weekend. She states that on Friday she left the appointment with her pain management doctor and she became very nauseous and started slurring her words and became very pale. She also developed left arm pain and weakness. She states she was not able to use the left or right arm. She states she did drive herself home. She has some amnesia regarding the event. She states she was unable to talk to use her car phone. She did drive herself home and get herself into bed. She states that it looks like her mouth was drooping, however, she states that it did eventually go away. She states that she did not think to go to the ER. She states that she is really just unsure what kind of happened. She states she slept most of the day the following day. She did have bilateral symptoms. She has never had symptoms like this before. She does have chronic back issues. She is scheduled to see neurosurgeon in the coming weeks. She has not had any episodes since then. Blood pressure has been normal.  She has no other concerns at this time. All symptoms have since resolved. Patient also reports significant menorrhagia with regular cycle. She is interested in birth control.   She is interested in [employer] for this. Review of Systems   Constitutional:  Negative for chills, fatigue, fever and unexpected weight change. HENT:  Negative for congestion, rhinorrhea and sore throat. Eyes:  Negative for discharge. Respiratory:  Negative for cough, shortness of breath and wheezing. Cardiovascular:  Negative for chest pain, palpitations and leg swelling. Gastrointestinal:  Negative for abdominal distention, abdominal pain, diarrhea, nausea and vomiting. Genitourinary:  Positive for menstrual problem. Negative for difficulty urinating, dyspareunia, dysuria and frequency. Musculoskeletal:  Positive for arthralgias, back pain and joint swelling. Skin:  Negative for rash. Neurological:  Negative for weakness, light-headedness, numbness and headaches. Hematological:  Does not bruise/bleed easily. Psychiatric/Behavioral:  Negative for confusion, self-injury, sleep disturbance and suicidal ideas. The patient is not nervous/anxious. Past Medical History:   Diagnosis Date    Asthma     exercise induced     Past Surgical History:   Procedure Laterality Date    BACK SURGERY      2006    BACK SURGERY  08/05/2021    BREAST ENHANCEMENT SURGERY      COSMETIC SURGERY      breast implants    FINGER SURGERY      age 3    LIPOSUCTION       Current Outpatient Medications   Medication Sig Dispense Refill    gabapentin (NEURONTIN) 300 MG capsule       etonogestrel (NEXPLANON) 68 MG implant 68 mg by Subdermal route once for 1 dose 1 each 0    cyclobenzaprine (FLEXERIL) 10 MG tablet TAKE 1 TABLET BY MOUTH TWICE DAILY AS NEEDED FOR MUSCLE SPASMS      HYDROcodone-acetaminophen (NORCO) 5-325 MG per tablet Take 1 tablet by mouth every 6 hours as needed for Pain.       QULIPTA 60 MG TABS TAKE 1 TABLET BY MOUTH EVERY DAY      metoclopramide (REGLAN) 10 MG tablet Take 1 tablet by mouth 4 times daily as needed (headache) 30 tablet 2    pantoprazole (PROTONIX) 40 MG tablet Take 1 tablet by mouth every morning (before breakfast) 30 tablet 3    HYDROcodone-acetaminophen (NORCO) 5-325 MG per tablet Take 2 tablets by mouth every 6 hours as needed for Pain for up to 7 days. Intended supply: 3 days. Take lowest dose possible to manage pain 10 tablet 0    lidocaine (LIDODERM) 5 % Place 1 patch onto the skin daily 12 hours on, 12 hours off. 30 patch 0    ondansetron (ZOFRAN ODT) 4 MG disintegrating tablet Place 1 tablet under the tongue every 8 hours as needed for Nausea or Vomiting 21 tablet 0    predniSONE (DELTASONE) 10 MG tablet Take 5 tabs daily x 3 days, 4 tabs daily x 3 days, 3 tabs daily x 3 days, 2 tabs daily x 3 days, 1 tab daily x 3 days 45 tablet 0    cyclobenzaprine (FLEXERIL) 10 MG tablet Take 1 tablet by mouth 3 times daily as needed for Muscle spasms 30 tablet 0     No current facility-administered medications for this visit. PMH, Surgical Hx, Family Hx, and Social Hx reviewed and updated. Health Maintenance reviewed. Objective    Vitals:    10/18/22 1131   BP: 128/82   Pulse: 99   Temp: 98.5 °F (36.9 °C)   SpO2: 98%   Weight: 159 lb 6.4 oz (72.3 kg)   Height: 5' 2\" (1.575 m)       Physical Exam  Vitals reviewed. Constitutional:       General: She is not in acute distress. HENT:      Head: Normocephalic and atraumatic. Eyes:      Conjunctiva/sclera: Conjunctivae normal.   Neck:      Thyroid: No thyromegaly or thyroid tenderness. Cardiovascular:      Rate and Rhythm: Normal rate and regular rhythm. Heart sounds: No murmur heard. No friction rub. No gallop. Pulmonary:      Effort: Pulmonary effort is normal.      Breath sounds: Normal breath sounds. No wheezing, rhonchi or rales. Abdominal:      General: Bowel sounds are normal. There is no distension. Palpations: Abdomen is soft. Tenderness: There is no abdominal tenderness. Musculoskeletal:         General: No swelling or deformity. Cervical back: Normal range of motion and neck supple. Right lower leg: No edema.       Left lower leg: No edema. Comments: Decreased range of motion with cervical spine, thoracic spine and lumbar spine. Lymphadenopathy:      Cervical: No cervical adenopathy. Skin:     General: Skin is warm and dry. Findings: No rash. Neurological:      General: No focal deficit present. Mental Status: She is alert. Cranial Nerves: No cranial nerve deficit. Sensory: No sensory deficit. Gait: Gait is intact. Psychiatric:         Mood and Affect: Mood normal.         Behavior: Behavior normal.          Assessment & Plan       1. Nausea  Patient with nausea and spasm with slurred speech over the weekend. Symptoms have mostly resolved. We will check labs including metabolic panel, magnesium, B12 and folate. Call with results when returned. She has not had any prolonged symptoms since then. Continue to monitor. If symptoms worsen or change, return to care sooner. Follow-up as scheduled. I did instruct her that if she has repeat symptoms, she should be seen in the ER right away. She voiced understanding. All questions answered. - Magnesium; Future  - Comprehensive Metabolic Panel, Fasting; Future    2. Spasm  As above. Check labs. Call with results when return  - Magnesium; Future  - Comprehensive Metabolic Panel, Fasting; Future  - Vitamin B12 & Folate; Future    3. Menorrhagia with regular cycle  Patient with menorrhagia with regular cycle. Interested  To help with this. Order placed. Follow-up once device is in for placement. - etonogestrel (NEXPLANON) 68 MG implant; 68 mg by Subdermal route once for 1 dose  Dispense: 1 each; Refill: 0    4.  Need for influenza vaccination  Shot given  - Influenza, FLUCELVAX, (age 10 mo+), IM, Preservative Free, 0.5 mL    Orders Placed This Encounter   Procedures    Influenza, FLUCELVAX, (age 10 mo+), IM, Preservative Free, 0.5 mL    Magnesium     Standing Status:   Future     Number of Occurrences:   1     Standing Expiration Date:   10/18/2023 Comprehensive Metabolic Panel, Fasting     Standing Status:   Future     Number of Occurrences:   1     Standing Expiration Date:   10/18/2023    Vitamin B12 & Folate     Standing Status:   Future     Number of Occurrences:   1     Standing Expiration Date:   10/18/2023     Orders Placed This Encounter   Medications    etonogestrel (NEXPLANON) 68 MG implant     Si mg by Subdermal route once for 1 dose     Dispense:  1 each     Refill:  0       Medications Discontinued During This Encounter   Medication Reason    acetaZOLAMIDE (DIAMOX) 250 MG tablet Therapy completed    aluminum chloride (DRYSOL) 20 % external solution Therapy completed    butalbital-acetaminophen-caffeine (FIORICET, ESGIC) -40 MG per tablet Therapy completed    midodrine (PROAMATINE) 5 MG tablet Therapy completed     No follow-ups on file. Reviewed with the patient: current clinical status,medications, activities and diet. Side effects, adverse effects of themedication prescribed today, as well as treatment plan/ rationale and result expectations have been discussed with the patient who expresses understanding and desires to proceed. Close follow up to evaluate treatment results and for coordination of care. I have reviewed the patient's medical history in detail and updated the computerized patient record. Please note, this report has been partially produced using speech recognition software and may cause  and /or contain errors related to that system including grammar, punctuation and spelling as well as words and phrases that may seem inappropriate. If there are questions or concerns please feel free to contact me to clarify.     Whit Pettit, DO

## 2022-10-19 LAB
FOLATE: 17.2 NG/ML
VITAMIN B-12: 1190 PG/ML (ref 232–1245)

## 2022-10-23 ENCOUNTER — OFFICE VISIT (OUTPATIENT)
Dept: FAMILY MEDICINE CLINIC | Age: 37
End: 2022-10-23
Payer: COMMERCIAL

## 2022-10-23 VITALS
RESPIRATION RATE: 14 BRPM | OXYGEN SATURATION: 99 % | WEIGHT: 169 LBS | SYSTOLIC BLOOD PRESSURE: 106 MMHG | HEART RATE: 82 BPM | DIASTOLIC BLOOD PRESSURE: 66 MMHG | TEMPERATURE: 98 F | BODY MASS INDEX: 31.1 KG/M2 | HEIGHT: 62 IN

## 2022-10-23 DIAGNOSIS — G89.29 CHRONIC RIGHT-SIDED LOW BACK PAIN WITH RIGHT-SIDED SCIATICA: Primary | ICD-10-CM

## 2022-10-23 DIAGNOSIS — M54.41 CHRONIC RIGHT-SIDED LOW BACK PAIN WITH RIGHT-SIDED SCIATICA: Primary | ICD-10-CM

## 2022-10-23 PROCEDURE — 96372 THER/PROPH/DIAG INJ SC/IM: CPT | Performed by: PHYSICIAN ASSISTANT

## 2022-10-23 PROCEDURE — G8482 FLU IMMUNIZE ORDER/ADMIN: HCPCS | Performed by: PHYSICIAN ASSISTANT

## 2022-10-23 PROCEDURE — 4004F PT TOBACCO SCREEN RCVD TLK: CPT | Performed by: PHYSICIAN ASSISTANT

## 2022-10-23 PROCEDURE — G8417 CALC BMI ABV UP PARAM F/U: HCPCS | Performed by: PHYSICIAN ASSISTANT

## 2022-10-23 PROCEDURE — G8427 DOCREV CUR MEDS BY ELIG CLIN: HCPCS | Performed by: PHYSICIAN ASSISTANT

## 2022-10-23 PROCEDURE — 99213 OFFICE O/P EST LOW 20 MIN: CPT | Performed by: PHYSICIAN ASSISTANT

## 2022-10-23 RX ORDER — KETOROLAC TROMETHAMINE 30 MG/ML
60 INJECTION, SOLUTION INTRAMUSCULAR; INTRAVENOUS ONCE
Status: COMPLETED | OUTPATIENT
Start: 2022-10-23 | End: 2022-10-23

## 2022-10-23 RX ORDER — CYCLOBENZAPRINE HCL 10 MG
10 TABLET ORAL 3 TIMES DAILY PRN
Qty: 30 TABLET | Refills: 0 | Status: SHIPPED | OUTPATIENT
Start: 2022-10-23 | End: 2022-11-02

## 2022-10-23 RX ORDER — PREDNISONE 10 MG/1
TABLET ORAL
Qty: 45 TABLET | Refills: 0 | Status: SHIPPED | OUTPATIENT
Start: 2022-10-23 | End: 2022-11-21 | Stop reason: ALTCHOICE

## 2022-10-23 RX ADMIN — KETOROLAC TROMETHAMINE 60 MG: 30 INJECTION, SOLUTION INTRAMUSCULAR; INTRAVENOUS at 11:01

## 2022-10-23 ASSESSMENT — PATIENT HEALTH QUESTIONNAIRE - PHQ9
6. FEELING BAD ABOUT YOURSELF - OR THAT YOU ARE A FAILURE OR HAVE LET YOURSELF OR YOUR FAMILY DOWN: 0
1. LITTLE INTEREST OR PLEASURE IN DOING THINGS: 0
SUM OF ALL RESPONSES TO PHQ QUESTIONS 1-9: 0
7. TROUBLE CONCENTRATING ON THINGS, SUCH AS READING THE NEWSPAPER OR WATCHING TELEVISION: 0
2. FEELING DOWN, DEPRESSED OR HOPELESS: 0
3. TROUBLE FALLING OR STAYING ASLEEP: 0
SUM OF ALL RESPONSES TO PHQ QUESTIONS 1-9: 0
SUM OF ALL RESPONSES TO PHQ QUESTIONS 1-9: 0
SUM OF ALL RESPONSES TO PHQ9 QUESTIONS 1 & 2: 0
9. THOUGHTS THAT YOU WOULD BE BETTER OFF DEAD, OR OF HURTING YOURSELF: 0
4. FEELING TIRED OR HAVING LITTLE ENERGY: 0
5. POOR APPETITE OR OVEREATING: 0
10. IF YOU CHECKED OFF ANY PROBLEMS, HOW DIFFICULT HAVE THESE PROBLEMS MADE IT FOR YOU TO DO YOUR WORK, TAKE CARE OF THINGS AT HOME, OR GET ALONG WITH OTHER PEOPLE: 0
8. MOVING OR SPEAKING SO SLOWLY THAT OTHER PEOPLE COULD HAVE NOTICED. OR THE OPPOSITE, BEING SO FIGETY OR RESTLESS THAT YOU HAVE BEEN MOVING AROUND A LOT MORE THAN USUAL: 0
SUM OF ALL RESPONSES TO PHQ QUESTIONS 1-9: 0

## 2022-10-23 ASSESSMENT — ENCOUNTER SYMPTOMS
CHEST TIGHTNESS: 0
VOMITING: 0
COUGH: 0
DIARRHEA: 0
SINUS PRESSURE: 0
TROUBLE SWALLOWING: 0
SHORTNESS OF BREATH: 0
BACK PAIN: 1
ABDOMINAL PAIN: 0

## 2022-10-23 NOTE — PROGRESS NOTES
Subjective:      Patient ID: Irving Gregory is a 40 y.o. female who presents today for:  Chief Complaint   Patient presents with    Leg Pain     R leg- patient states that she had back surgery and they did the incorrect surgery and has had pain ever since states that she was seen by Dr. Solo Dixon recently and given prednisone for it and needs something       HPI   40year old female who presents with complaints of low back pain radiating to her left leg. . had surgery on her spine but did not help. Past Medical History:   Diagnosis Date    ADHD (attention deficit hyperactivity disorder)     Asthma     exercise induced     Past Surgical History:   Procedure Laterality Date    BACK SURGERY      2006    BACK SURGERY  08/05/2021    BREAST ENHANCEMENT SURGERY      COSMETIC SURGERY      breast implants    FINGER SURGERY      age 3    LIPOSUCTION       Social History     Socioeconomic History    Marital status: Single     Spouse name: Not on file    Number of children: Not on file    Years of education: Not on file    Highest education level: Not on file   Occupational History    Not on file   Tobacco Use    Smoking status: Every Day     Packs/day: 0.50     Years: 19.00     Pack years: 9.50     Types: Cigarettes    Smokeless tobacco: Never    Tobacco comments:     started age 13   Vaping Use    Vaping Use: Never used   Substance and Sexual Activity    Alcohol use: Yes     Alcohol/week: 0.0 standard drinks     Comment: ocassionally    Drug use: No    Sexual activity: Never   Other Topics Concern    Not on file   Social History Narrative    Not on file     Social Determinants of Health     Financial Resource Strain: Low Risk     Difficulty of Paying Living Expenses: Not hard at all   Food Insecurity: No Food Insecurity    Worried About [address] in the Last Year: Never true    [address] in the Last Year: Never true   Transportation Needs: No Transportation Needs    Lack of Transportation (Medical):  No Lack of Transportation (Non-Medical): No   Physical Activity: Not on file   Stress: Not on file   Social Connections: Not on file   Intimate Partner Violence: Not on file   Housing Stability: Not on file     Family History   Problem Relation Age of Onset    Hypertension Mother     No Known Problems Father     Breast Cancer Neg Hx     Cancer Neg Hx     Colon Cancer Neg Hx     Diabetes Neg Hx     Eclampsia Neg Hx     Ovarian Cancer Neg Hx      Labor Neg Hx     Spont Abortions Neg Hx     Stroke Neg Hx      Allergies   Allergen Reactions    Amoxicillin Hives    Anesthetics, Halogenated      Hard to come out of it, hyperventilating, convulsion, nonresponsive    Oxycodone Nausea Only    Tramadol     Oxycodone-Acetaminophen Hives and Nausea And Vomiting    Topiramate Rash     Current Outpatient Medications   Medication Sig Dispense Refill    predniSONE (DELTASONE) 10 MG tablet Take 5 tabs daily x 3 days, 4 tabs daily x 3 days, 3 tabs daily x 3 days, 2 tabs daily x 3 days, 1 tab daily x 3 days 45 tablet 0    cyclobenzaprine (FLEXERIL) 10 MG tablet Take 1 tablet by mouth 3 times daily as needed for Muscle spasms 30 tablet 0    gabapentin (NEURONTIN) 300 MG capsule       cyclobenzaprine (FLEXERIL) 10 MG tablet TAKE 1 TABLET BY MOUTH TWICE DAILY AS NEEDED FOR MUSCLE SPASMS      HYDROcodone-acetaminophen (NORCO) 5-325 MG per tablet Take 1 tablet by mouth every 6 hours as needed for Pain.       QULIPTA 60 MG TABS TAKE 1 TABLET BY MOUTH EVERY DAY      metoclopramide (REGLAN) 10 MG tablet Take 1 tablet by mouth 4 times daily as needed (headache) 30 tablet 2    etonogestrel (NEXPLANON) 68 MG implant 68 mg by Subdermal route once for 1 dose 1 each 0    pantoprazole (PROTONIX) 40 MG tablet Take 1 tablet by mouth every morning (before breakfast) 30 tablet 3     Current Facility-Administered Medications   Medication Dose Route Frequency Provider Last Rate Last Admin    ketorolac (TORADOL) injection 60 mg  60 mg IntraMUSCular Once SAIGE Chun              Review of Systems   Constitutional:  Negative for activity change, appetite change, chills, fever and unexpected weight change. HENT:  Negative for drooling, ear pain, nosebleeds, sinus pressure and trouble swallowing. Respiratory:  Negative for cough, chest tightness and shortness of breath. Cardiovascular:  Negative for chest pain and leg swelling. Gastrointestinal:  Negative for abdominal pain, diarrhea and vomiting. Endocrine: Negative for polydipsia and polyphagia. Genitourinary:  Negative for dysuria, flank pain and frequency. Musculoskeletal:  Positive for back pain. Negative for myalgias. Skin:  Negative for pallor and rash. Neurological:  Negative for syncope, weakness and headaches. Hematological:  Does not bruise/bleed easily. Psychiatric/Behavioral:  Negative for agitation, behavioral problems and confusion. All other systems reviewed and are negative. Objective:   /66 (Site: Right Upper Arm, Position: Sitting, Cuff Size: Medium Adult)   Pulse 82   Temp 98 °F (36.7 °C) (Temporal)   Resp 14   Ht 5' 2\" (1.575 m)   Wt 169 lb (76.7 kg)   SpO2 99%   BMI 30.91 kg/m²     Physical Exam  Vitals and nursing note reviewed. Constitutional:       General: She is awake. She is not in acute distress. Appearance: Normal appearance. She is well-developed and normal weight. She is not ill-appearing, toxic-appearing or diaphoretic. Comments: No photophobia. No phonophobia. HENT:      Head: Normocephalic and atraumatic. No Bingham's sign. Right Ear: External ear normal.      Left Ear: External ear normal.      Nose: Nose normal. No congestion or rhinorrhea. Mouth/Throat:      Mouth: Mucous membranes are moist.      Pharynx: Oropharynx is clear. No oropharyngeal exudate or posterior oropharyngeal erythema. Eyes:      General: No scleral icterus. Right eye: No foreign body or discharge.          Left eye: No discharge. Extraocular Movements: Extraocular movements intact. Conjunctiva/sclera: Conjunctivae normal.      Left eye: No exudate. Pupils: Pupils are equal, round, and reactive to light. Neck:      Vascular: No JVD. Trachea: No tracheal deviation. Comments: No meningismus. Cardiovascular:      Rate and Rhythm: Normal rate and regular rhythm. Heart sounds: Normal heart sounds. Heart sounds not distant. No murmur heard. No friction rub. No gallop. Pulmonary:      Effort: Pulmonary effort is normal. No respiratory distress. Breath sounds: Normal breath sounds. No stridor. No wheezing, rhonchi or rales. Chest:      Chest wall: No tenderness. Abdominal:      General: Abdomen is flat. Bowel sounds are normal. There is no distension. Palpations: Abdomen is soft. There is no mass. Tenderness: There is no abdominal tenderness. There is no right CVA tenderness, left CVA tenderness, guarding or rebound. Hernia: No hernia is present. Musculoskeletal:         General: No swelling, tenderness, deformity or signs of injury. Normal range of motion. Cervical back: Normal range of motion and neck supple. No rigidity. Lymphadenopathy:      Head:      Right side of head: No submental adenopathy. Left side of head: No submental adenopathy. Skin:     General: Skin is warm and dry. Capillary Refill: Capillary refill takes less than 2 seconds. Coloration: Skin is not jaundiced or pale. Findings: No bruising, erythema, lesion or rash. Neurological:      General: No focal deficit present. Mental Status: She is alert and oriented to person, place, and time. Mental status is at baseline. Cranial Nerves: No cranial nerve deficit. Sensory: No sensory deficit. Motor: No weakness. Coordination: Coordination normal.      Deep Tendon Reflexes: Reflexes are normal and symmetric.    Psychiatric:         Mood and Affect: Mood normal. Behavior: Behavior normal. Behavior is cooperative. Thought Content: Thought content normal.         Judgment: Judgment normal.       Assessment:       Diagnosis Orders   1. Chronic right-sided low back pain with right-sided sciatica  predniSONE (DELTASONE) 10 MG tablet    cyclobenzaprine (FLEXERIL) 10 MG tablet    ketorolac (TORADOL) injection 60 mg    Chidi Luis MD, Neurosurgery, [place]        No results found for this visit on 10/23/22. Plan:     Assessment & Plan   Brianna Jones was seen today for leg pain. Diagnoses and all orders for this visit:    Chronic right-sided low back pain with right-sided sciatica  -     predniSONE (DELTASONE) 10 MG tablet; Take 5 tabs daily x 3 days, 4 tabs daily x 3 days, 3 tabs daily x 3 days, 2 tabs daily x 3 days, 1 tab daily x 3 days  -     cyclobenzaprine (FLEXERIL) 10 MG tablet; Take 1 tablet by mouth 3 times daily as needed for Muscle spasms  -     ketorolac (TORADOL) injection 60 mg  -     [facility]   Procedures    Chidi Luis MD, Neurosurgery, [place]     Referral Priority:   Routine     Referral Type:   Eval and Treat     Referral Reason:   Specialty Services Required     Referred to Provider:   Rigoberto Yusuf MD     Requested Specialty:   Neurosurgery     Number of Visits Requested:   1     Orders Placed This Encounter   Medications    predniSONE (DELTASONE) 10 MG tablet     Sig: Take 5 tabs daily x 3 days, 4 tabs daily x 3 days, 3 tabs daily x 3 days, 2 tabs daily x 3 days, 1 tab daily x 3 days     Dispense:  45 tablet     Refill:  0    cyclobenzaprine (FLEXERIL) 10 MG tablet     Sig: Take 1 tablet by mouth 3 times daily as needed for Muscle spasms     Dispense:  30 tablet     Refill:  0    ketorolac (TORADOL) injection 60 mg     There are no discontinued medications. Return if symptoms worsen or fail to improve.         Reviewed with the patient/family: current clinical status & medications. Side effects of the medication prescribed today, as well as treatment plan/rationale and result expectations have been discussed with the patient/family who expresses understanding. Patient will be discharged home in stable condition. Follow up with PCP to evaluate treatment results or return if symptoms worsen or fail to improve. Discussed signs and symptoms which require immediate follow-up in ED/call to 911. Understanding verbalized. I have reviewed the patient's medical history in detail and updated the computerized patient record.     SAIGE Gregory

## 2022-10-24 ENCOUNTER — HOSPITAL ENCOUNTER (EMERGENCY)
Age: 37
Discharge: HOME | End: 2022-10-24
Payer: COMMERCIAL

## 2022-10-24 ENCOUNTER — APPOINTMENT (OUTPATIENT)
Dept: MRI IMAGING | Age: 37
End: 2022-10-24
Payer: COMMERCIAL

## 2022-10-24 ENCOUNTER — TELEPHONE (OUTPATIENT)
Dept: FAMILY MEDICINE CLINIC | Age: 37
End: 2022-10-24

## 2022-10-24 VITALS
TEMPERATURE: 98.6 F | DIASTOLIC BLOOD PRESSURE: 65 MMHG | RESPIRATION RATE: 18 BRPM | HEART RATE: 88 BPM | OXYGEN SATURATION: 97 % | SYSTOLIC BLOOD PRESSURE: 108 MMHG

## 2022-10-24 DIAGNOSIS — M54.50 ACUTE EXACERBATION OF CHRONIC LOW BACK PAIN: Primary | ICD-10-CM

## 2022-10-24 DIAGNOSIS — G89.29 ACUTE EXACERBATION OF CHRONIC LOW BACK PAIN: Primary | ICD-10-CM

## 2022-10-24 LAB
ALBUMIN SERPL-MCNC: 4.5 G/DL (ref 3.5–4.6)
ALP BLD-CCNC: 78 U/L (ref 40–130)
ALT SERPL-CCNC: 19 U/L (ref 0–33)
AMPHETAMINE SCREEN, URINE: NORMAL
ANION GAP SERPL CALCULATED.3IONS-SCNC: 11 MEQ/L (ref 9–15)
AST SERPL-CCNC: 16 U/L (ref 0–35)
BACTERIA: ABNORMAL /HPF
BARBITURATE SCREEN URINE: NORMAL
BASOPHILS ABSOLUTE: 0 K/UL (ref 0–0.2)
BASOPHILS RELATIVE PERCENT: 0.4 %
BENZODIAZEPINE SCREEN, URINE: NORMAL
BILIRUB SERPL-MCNC: <0.2 MG/DL (ref 0.2–0.7)
BILIRUBIN URINE: NEGATIVE
BLOOD, URINE: NEGATIVE
BUN BLDV-MCNC: 11 MG/DL (ref 6–20)
C-REACTIVE PROTEIN: <3 MG/L (ref 0–5)
CALCIUM SERPL-MCNC: 9.3 MG/DL (ref 8.5–9.9)
CANNABINOID SCREEN URINE: NORMAL
CHLORIDE BLD-SCNC: 104 MEQ/L (ref 95–107)
CLARITY: CLEAR
CO2: 26 MEQ/L (ref 20–31)
COCAINE METABOLITE SCREEN URINE: NORMAL
COLOR: YELLOW
CREAT SERPL-MCNC: 0.55 MG/DL (ref 0.5–0.9)
EOSINOPHILS ABSOLUTE: 0 K/UL (ref 0–0.7)
EOSINOPHILS RELATIVE PERCENT: 0.3 %
EPITHELIAL CELLS, UA: ABNORMAL /HPF (ref 0–5)
FENTANYL SCREEN, URINE: NORMAL
GFR SERPL CREATININE-BSD FRML MDRD: >60 ML/MIN/{1.73_M2}
GLOBULIN: 3.1 G/DL (ref 2.3–3.5)
GLUCOSE BLD-MCNC: 99 MG/DL (ref 70–99)
GLUCOSE URINE: NEGATIVE MG/DL
HCG, URINE, POC: NEGATIVE
HCT VFR BLD CALC: 39 % (ref 37–47)
HEMOGLOBIN: 13 G/DL (ref 12–16)
HYALINE CASTS: ABNORMAL /HPF (ref 0–5)
KETONES, URINE: NEGATIVE MG/DL
LEUKOCYTE ESTERASE, URINE: ABNORMAL
LYMPHOCYTES ABSOLUTE: 1.2 K/UL (ref 1–4.8)
LYMPHOCYTES RELATIVE PERCENT: 13.3 %
Lab: NORMAL
Lab: NORMAL
MCH RBC QN AUTO: 30.1 PG (ref 27–31.3)
MCHC RBC AUTO-ENTMCNC: 33.4 % (ref 33–37)
MCV RBC AUTO: 90 FL (ref 79.4–94.8)
METHADONE SCREEN, URINE: NORMAL
MONOCYTES ABSOLUTE: 0.8 K/UL (ref 0.2–0.8)
MONOCYTES RELATIVE PERCENT: 9 %
NEGATIVE QC PASS/FAIL: NORMAL
NEUTROPHILS ABSOLUTE: 6.9 K/UL (ref 1.4–6.5)
NEUTROPHILS RELATIVE PERCENT: 77 %
NITRITE, URINE: NEGATIVE
OPIATE SCREEN URINE: NORMAL
OXYCODONE URINE: NORMAL
PDW BLD-RTO: 14 % (ref 11.5–14.5)
PH UA: 6.5 (ref 5–9)
PHENCYCLIDINE SCREEN URINE: NORMAL
PLATELET # BLD: 363 K/UL (ref 130–400)
POSITIVE QC PASS/FAIL: NORMAL
POTASSIUM SERPL-SCNC: 3.3 MEQ/L (ref 3.4–4.9)
PROPOXYPHENE SCREEN: NORMAL
PROTEIN UA: NEGATIVE MG/DL
RBC # BLD: 4.33 M/UL (ref 4.2–5.4)
RBC UA: ABNORMAL /HPF (ref 0–5)
SEDIMENTATION RATE, ERYTHROCYTE: 15 MM (ref 0–20)
SODIUM BLD-SCNC: 141 MEQ/L (ref 135–144)
SPECIFIC GRAVITY UA: 1 (ref 1–1.03)
TOTAL PROTEIN: 7.6 G/DL (ref 6.3–8)
URINE REFLEX TO CULTURE: ABNORMAL
UROBILINOGEN, URINE: 0.2 E.U./DL
WBC # BLD: 9 K/UL (ref 4.8–10.8)
WBC UA: ABNORMAL /HPF (ref 0–5)

## 2022-10-24 PROCEDURE — 86140 C-REACTIVE PROTEIN: CPT

## 2022-10-24 PROCEDURE — 2580000003 HC RX 258: Performed by: STUDENT IN AN ORGANIZED HEALTH CARE EDUCATION/TRAINING PROGRAM

## 2022-10-24 PROCEDURE — 6360000002 HC RX W HCPCS: Performed by: STUDENT IN AN ORGANIZED HEALTH CARE EDUCATION/TRAINING PROGRAM

## 2022-10-24 PROCEDURE — 85652 RBC SED RATE AUTOMATED: CPT

## 2022-10-24 PROCEDURE — 96372 THER/PROPH/DIAG INJ SC/IM: CPT

## 2022-10-24 PROCEDURE — 96375 TX/PRO/DX INJ NEW DRUG ADDON: CPT

## 2022-10-24 PROCEDURE — 99285 EMERGENCY DEPT VISIT HI MDM: CPT

## 2022-10-24 PROCEDURE — 96374 THER/PROPH/DIAG INJ IV PUSH: CPT

## 2022-10-24 PROCEDURE — 80053 COMPREHEN METABOLIC PANEL: CPT

## 2022-10-24 PROCEDURE — 6360000004 HC RX CONTRAST MEDICATION: Performed by: STUDENT IN AN ORGANIZED HEALTH CARE EDUCATION/TRAINING PROGRAM

## 2022-10-24 PROCEDURE — 80307 DRUG TEST PRSMV CHEM ANLYZR: CPT

## 2022-10-24 PROCEDURE — 96361 HYDRATE IV INFUSION ADD-ON: CPT

## 2022-10-24 PROCEDURE — A9579 GAD-BASE MR CONTRAST NOS,1ML: HCPCS | Performed by: STUDENT IN AN ORGANIZED HEALTH CARE EDUCATION/TRAINING PROGRAM

## 2022-10-24 PROCEDURE — 96376 TX/PRO/DX INJ SAME DRUG ADON: CPT

## 2022-10-24 PROCEDURE — 72158 MRI LUMBAR SPINE W/O & W/DYE: CPT

## 2022-10-24 PROCEDURE — 85025 COMPLETE CBC W/AUTO DIFF WBC: CPT

## 2022-10-24 PROCEDURE — 81001 URINALYSIS AUTO W/SCOPE: CPT

## 2022-10-24 RX ORDER — KETOROLAC TROMETHAMINE 30 MG/ML
30 INJECTION, SOLUTION INTRAMUSCULAR; INTRAVENOUS ONCE
Status: COMPLETED | OUTPATIENT
Start: 2022-10-24 | End: 2022-10-24

## 2022-10-24 RX ORDER — ORPHENADRINE CITRATE 30 MG/ML
60 INJECTION INTRAMUSCULAR; INTRAVENOUS ONCE
Status: COMPLETED | OUTPATIENT
Start: 2022-10-24 | End: 2022-10-24

## 2022-10-24 RX ORDER — LIDOCAINE 50 MG/G
1 PATCH TOPICAL DAILY
Qty: 30 PATCH | Refills: 0 | Status: SHIPPED | OUTPATIENT
Start: 2022-10-24 | End: 2022-11-23

## 2022-10-24 RX ORDER — ONDANSETRON 4 MG/1
4 TABLET, ORALLY DISINTEGRATING ORAL EVERY 8 HOURS PRN
Qty: 21 TABLET | Refills: 0 | Status: SHIPPED | OUTPATIENT
Start: 2022-10-24 | End: 2022-10-31

## 2022-10-24 RX ORDER — 0.9 % SODIUM CHLORIDE 0.9 %
1000 INTRAVENOUS SOLUTION INTRAVENOUS ONCE
Status: COMPLETED | OUTPATIENT
Start: 2022-10-24 | End: 2022-10-24

## 2022-10-24 RX ORDER — ONDANSETRON 2 MG/ML
4 INJECTION INTRAMUSCULAR; INTRAVENOUS ONCE
Status: COMPLETED | OUTPATIENT
Start: 2022-10-24 | End: 2022-10-24

## 2022-10-24 RX ORDER — HYDROCODONE BITARTRATE AND ACETAMINOPHEN 5; 325 MG/1; MG/1
2 TABLET ORAL EVERY 6 HOURS PRN
Qty: 10 TABLET | Refills: 0 | Status: SHIPPED | OUTPATIENT
Start: 2022-10-24 | End: 2022-10-31

## 2022-10-24 RX ADMIN — GADOTERIDOL 15 ML: 279.3 INJECTION, SOLUTION INTRAVENOUS at 14:00

## 2022-10-24 RX ADMIN — HYDROMORPHONE HYDROCHLORIDE 1 MG: 1 INJECTION, SOLUTION INTRAMUSCULAR; INTRAVENOUS; SUBCUTANEOUS at 14:49

## 2022-10-24 RX ADMIN — ONDANSETRON 4 MG: 2 INJECTION INTRAMUSCULAR; INTRAVENOUS at 11:42

## 2022-10-24 RX ADMIN — SODIUM CHLORIDE 1000 ML: 9 INJECTION, SOLUTION INTRAVENOUS at 11:58

## 2022-10-24 RX ADMIN — ORPHENADRINE CITRATE 60 MG: 30 INJECTION INTRAMUSCULAR; INTRAVENOUS at 14:49

## 2022-10-24 RX ADMIN — HYDROMORPHONE HYDROCHLORIDE 1 MG: 1 INJECTION, SOLUTION INTRAMUSCULAR; INTRAVENOUS; SUBCUTANEOUS at 11:42

## 2022-10-24 RX ADMIN — KETOROLAC TROMETHAMINE 30 MG: 30 INJECTION, SOLUTION INTRAMUSCULAR at 11:43

## 2022-10-24 ASSESSMENT — ENCOUNTER SYMPTOMS
VOMITING: 0
WHEEZING: 0
PHOTOPHOBIA: 0
COUGH: 0
NAUSEA: 0
WHEEZING: 0
ABDOMINAL PAIN: 0
SHORTNESS OF BREATH: 0
ABDOMINAL DISTENTION: 0
DIARRHEA: 0
NAUSEA: 0
SHORTNESS OF BREATH: 0
ABDOMINAL PAIN: 0
VOMITING: 0
COUGH: 0
EYE DISCHARGE: 0
BACK PAIN: 1
RHINORRHEA: 0
SORE THROAT: 0
BACK PAIN: 1

## 2022-10-24 ASSESSMENT — PAIN DESCRIPTION - ORIENTATION: ORIENTATION: MID;LOWER

## 2022-10-24 ASSESSMENT — PAIN SCALES - GENERAL
PAINLEVEL_OUTOF10: 10

## 2022-10-24 ASSESSMENT — PAIN DESCRIPTION - PAIN TYPE: TYPE: CHRONIC PAIN;ACUTE PAIN

## 2022-10-24 ASSESSMENT — PAIN - FUNCTIONAL ASSESSMENT: PAIN_FUNCTIONAL_ASSESSMENT: 0-10

## 2022-10-24 ASSESSMENT — PAIN DESCRIPTION - LOCATION: LOCATION: BACK

## 2022-10-24 ASSESSMENT — PAIN DESCRIPTION - DIRECTION: RADIATING_TOWARDS: DOWN LEGS

## 2022-10-24 NOTE — RESULT ENCOUNTER NOTE
Please inform patient that B12 and folate numbers were normal.  Metabolic panel was normal.  Follow-up as scheduled.   Thanks

## 2022-10-24 NOTE — ED NOTES
Pt ambulates to restroom with steady gait, independently, no obvious s/s of distress noted.       Ama Camacho RN  10/24/22 1016
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
## 2022-10-25 ENCOUNTER — INITIAL CONSULT (OUTPATIENT)
Dept: NEUROSURGERY | Age: 37
End: 2022-10-25
Payer: COMMERCIAL

## 2022-10-25 VITALS
WEIGHT: 158 LBS | DIASTOLIC BLOOD PRESSURE: 68 MMHG | TEMPERATURE: 98 F | BODY MASS INDEX: 29.08 KG/M2 | HEIGHT: 62 IN | SYSTOLIC BLOOD PRESSURE: 116 MMHG

## 2022-10-25 DIAGNOSIS — D68.00 VON WILLEBRAND DISEASE: ICD-10-CM

## 2022-10-25 DIAGNOSIS — F17.200 SMOKER: Primary | ICD-10-CM

## 2022-10-25 DIAGNOSIS — M51.26 HERNIATED NUCLEUS PULPOSUS, L4-5 RIGHT: ICD-10-CM

## 2022-10-25 PROCEDURE — G8417 CALC BMI ABV UP PARAM F/U: HCPCS | Performed by: NEUROLOGICAL SURGERY

## 2022-10-25 PROCEDURE — G8482 FLU IMMUNIZE ORDER/ADMIN: HCPCS | Performed by: NEUROLOGICAL SURGERY

## 2022-10-25 PROCEDURE — G8427 DOCREV CUR MEDS BY ELIG CLIN: HCPCS | Performed by: NEUROLOGICAL SURGERY

## 2022-10-25 PROCEDURE — 99243 OFF/OP CNSLTJ NEW/EST LOW 30: CPT | Performed by: NEUROLOGICAL SURGERY

## 2022-10-25 ASSESSMENT — ENCOUNTER SYMPTOMS
SHORTNESS OF BREATH: 0
CONSTIPATION: 1
EYE PAIN: 0
BACK PAIN: 1
NAUSEA: 1

## 2022-10-31 ENCOUNTER — PROCEDURE VISIT (OUTPATIENT)
Dept: FAMILY MEDICINE CLINIC | Age: 37
End: 2022-10-31
Payer: COMMERCIAL

## 2022-10-31 VITALS
HEIGHT: 62 IN | OXYGEN SATURATION: 99 % | BODY MASS INDEX: 30.4 KG/M2 | WEIGHT: 165.2 LBS | HEART RATE: 105 BPM | SYSTOLIC BLOOD PRESSURE: 133 MMHG | DIASTOLIC BLOOD PRESSURE: 77 MMHG | TEMPERATURE: 98.8 F

## 2022-10-31 DIAGNOSIS — N92.0 MENORRHAGIA WITH REGULAR CYCLE: ICD-10-CM

## 2022-10-31 DIAGNOSIS — Z30.017 NEXPLANON INSERTION: Primary | ICD-10-CM

## 2022-10-31 LAB
CONTROL: NORMAL
PREGNANCY TEST URINE, POC: NEGATIVE

## 2022-10-31 PROCEDURE — 11981 INSERTION DRUG DLVR IMPLANT: CPT | Performed by: STUDENT IN AN ORGANIZED HEALTH CARE EDUCATION/TRAINING PROGRAM

## 2022-10-31 PROCEDURE — 81025 URINE PREGNANCY TEST: CPT | Performed by: STUDENT IN AN ORGANIZED HEALTH CARE EDUCATION/TRAINING PROGRAM

## 2022-10-31 NOTE — PROGRESS NOTES
Nexplanon Insertion Procedure Note    Pre-operative Diagnosis: irregular menses    Post-operative Diagnosis: irregular menses    Indications: same     Procedure Details   Urine pregnancy test was done  and result was negative. The risks (including infection, bleeding, pain) and benefits of the procedure were explained to the patient and written informed consent was obtained. Nondominant arm , left arm, medial aspect the medial epicondyle was located , at 8-10 cm above the medial epicondyle a marker pen was used and 2 points in a straight line parallel to the long axis of the arm in the midline was chosen. I used ~ 3 cc of xylocaine 1% with epinephrine injected along the designated line. The Nexplanon applicator was then used , the large bore needle on the applicator harboring the nexaplanon implant was then tunneled through the skin subdermally, once the whole needle was under the skin, the knob on the applicator was push back to a complete stop, ejecting the nexplanon, where it was palpated and confirmed to be in the right location. Patient tolerated procedure well. Arm wrapped with compression gauze to prevent possible bruising. Patient also palpated device. Condition:  Stable    Complications:  None    Plan:    The patient was advised to call for any fever or for prolonged or severe pain or bleeding. She was advised to use OTC ibuprofen as needed for mild to moderate pain. Follow up:  Return in about 4 weeks for check nexplanon.       Drucie Osgood, DO

## 2022-11-02 ENCOUNTER — TELEPHONE (OUTPATIENT)
Dept: NEUROSURGERY | Age: 37
End: 2022-11-02

## 2022-11-02 PROBLEM — D68.00 VON WILLEBRAND DISEASE (HCC): Status: ACTIVE | Noted: 2022-11-02

## 2022-11-02 NOTE — TELEPHONE ENCOUNTER
PT SAW DR. Howard Loop 10/13/2022 - OFFICE NOTE IS SCANNED INTO THE MEDIA TAB. PLEASE ADVISE IF SURGERY NEEDS TO BE POSTPONED.

## 2022-11-02 NOTE — TELEPHONE ENCOUNTER
SURG DATE:  11/11/2022 -  RIGHT L 4-5 MICRODISKECTOMY  ** PT ALLERGY TO HALOGEN ANESTHETICS  **    PT STATES SHE IS BEING SEEN BY DR. Yesi Mccartney FOR DIAGNOSING VON WILLEBRAND DISEASE. NO TREATMENT HAS BEEN STARTED. SHE DID NOT THINK TO LET [facility] KNOW THAT SINCE SHE WAS SHOCKED SHE NEEDED SURGERY. Bard Everardo BEAULIEU TO BE AWARE.

## 2022-11-02 NOTE — TELEPHONE ENCOUNTER
Patient referred to Pat Michaels [phone].   We will need those notes or information from the patient

## 2022-11-07 ENCOUNTER — OFFICE VISIT (OUTPATIENT)
Dept: FAMILY MEDICINE CLINIC | Age: 37
End: 2022-11-07
Payer: COMMERCIAL

## 2022-11-07 VITALS
OXYGEN SATURATION: 98 % | HEART RATE: 110 BPM | HEIGHT: 62 IN | TEMPERATURE: 98.1 F | SYSTOLIC BLOOD PRESSURE: 122 MMHG | BODY MASS INDEX: 29.48 KG/M2 | WEIGHT: 160.2 LBS | DIASTOLIC BLOOD PRESSURE: 74 MMHG

## 2022-11-07 DIAGNOSIS — M51.26 LUMBAR DISC HERNIATION: ICD-10-CM

## 2022-11-07 DIAGNOSIS — Z01.818 PRE-OP EXAM: Primary | ICD-10-CM

## 2022-11-07 DIAGNOSIS — Z01.818 PRE-OP EXAM: ICD-10-CM

## 2022-11-07 LAB
APTT: 32.9 SEC (ref 24.4–36.8)
INR BLD: 1.1
PROTHROMBIN TIME: 13.8 SEC (ref 12.3–14.9)

## 2022-11-07 PROCEDURE — G8482 FLU IMMUNIZE ORDER/ADMIN: HCPCS | Performed by: NURSE PRACTITIONER

## 2022-11-07 PROCEDURE — 4004F PT TOBACCO SCREEN RCVD TLK: CPT | Performed by: NURSE PRACTITIONER

## 2022-11-07 PROCEDURE — 99213 OFFICE O/P EST LOW 20 MIN: CPT | Performed by: NURSE PRACTITIONER

## 2022-11-07 PROCEDURE — G8417 CALC BMI ABV UP PARAM F/U: HCPCS | Performed by: NURSE PRACTITIONER

## 2022-11-07 PROCEDURE — G8427 DOCREV CUR MEDS BY ELIG CLIN: HCPCS | Performed by: NURSE PRACTITIONER

## 2022-11-07 RX ORDER — ATOGEPANT 60 MG/1
60 TABLET ORAL ONCE
Qty: 4 TABLET | Refills: 0 | Status: CANCELLED | COMMUNITY
Start: 2022-11-07 | End: 2022-11-07

## 2022-11-07 RX ORDER — CYCLOBENZAPRINE HCL 10 MG
10 TABLET ORAL 3 TIMES DAILY PRN
Qty: 20 TABLET | Refills: 0 | Status: SHIPPED | OUTPATIENT
Start: 2022-11-07 | End: 2022-11-17

## 2022-11-07 RX ORDER — GABAPENTIN 300 MG/1
300 CAPSULE ORAL 3 TIMES DAILY
Qty: 90 CAPSULE | Refills: 0 | Status: SHIPPED | OUTPATIENT
Start: 2022-11-07 | End: 2022-12-07

## 2022-11-07 ASSESSMENT — ENCOUNTER SYMPTOMS
ABDOMINAL DISTENTION: 0
SINUS PRESSURE: 0
CHEST TIGHTNESS: 0
FACIAL SWELLING: 0
EYE DISCHARGE: 0
ABDOMINAL PAIN: 0
WHEEZING: 0
SHORTNESS OF BREATH: 0
DIARRHEA: 0
CONSTIPATION: 0
EYE REDNESS: 0
COUGH: 0
SORE THROAT: 0
BACK PAIN: 1
NAUSEA: 0
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
## 2022-11-08 ENCOUNTER — ANESTHESIA EVENT (OUTPATIENT)
Dept: OPERATING ROOM | Age: 37
End: 2022-11-08
Payer: COMMERCIAL

## 2022-11-11 ENCOUNTER — HOSPITAL ENCOUNTER (OUTPATIENT)
Age: 37
Setting detail: OUTPATIENT SURGERY
Discharge: HOME | End: 2022-11-11
Attending: NEUROLOGICAL SURGERY | Admitting: NEUROLOGICAL SURGERY
Payer: COMMERCIAL

## 2022-11-11 ENCOUNTER — TELEPHONE (OUTPATIENT)
Dept: NEUROSURGERY | Age: 37
End: 2022-11-11

## 2022-11-11 ENCOUNTER — HOSPITAL ENCOUNTER (OUTPATIENT)
Dept: GENERAL RADIOLOGY | Age: 37
Setting detail: OUTPATIENT SURGERY
Discharge: HOME | End: 2022-11-13
Attending: NEUROLOGICAL SURGERY
Payer: COMMERCIAL

## 2022-11-11 ENCOUNTER — ANESTHESIA (OUTPATIENT)
Dept: OPERATING ROOM | Age: 37
End: 2022-11-11
Payer: COMMERCIAL

## 2022-11-11 VITALS
HEART RATE: 82 BPM | SYSTOLIC BLOOD PRESSURE: 110 MMHG | TEMPERATURE: 98 F | RESPIRATION RATE: 16 BRPM | OXYGEN SATURATION: 100 % | WEIGHT: 160 LBS | BODY MASS INDEX: 29.44 KG/M2 | DIASTOLIC BLOOD PRESSURE: 66 MMHG | HEIGHT: 62 IN

## 2022-11-11 DIAGNOSIS — M51.26 HERNIATED NUCLEUS PULPOSUS, L4-5 RIGHT: Primary | ICD-10-CM

## 2022-11-11 DIAGNOSIS — R52 PAIN: ICD-10-CM

## 2022-11-11 LAB
HCG, URINE, POC: NEGATIVE
Lab: NORMAL
NEGATIVE QC PASS/FAIL: NORMAL
POSITIVE QC PASS/FAIL: NORMAL

## 2022-11-11 PROCEDURE — 7100000001 HC PACU RECOVERY - ADDTL 15 MIN: Performed by: NEUROLOGICAL SURGERY

## 2022-11-11 PROCEDURE — 3600000014 HC SURGERY LEVEL 4 ADDTL 15MIN: Performed by: NEUROLOGICAL SURGERY

## 2022-11-11 PROCEDURE — 2709999900 HC NON-CHARGEABLE SUPPLY: Performed by: NEUROLOGICAL SURGERY

## 2022-11-11 PROCEDURE — C1713 ANCHOR/SCREW BN/BN,TIS/BN: HCPCS | Performed by: NEUROLOGICAL SURGERY

## 2022-11-11 PROCEDURE — 3209999900 FLUORO FOR SURGICAL PROCEDURES

## 2022-11-11 PROCEDURE — 6370000000 HC RX 637 (ALT 250 FOR IP): Performed by: NEUROLOGICAL SURGERY

## 2022-11-11 PROCEDURE — 2580000003 HC RX 258: Performed by: NEUROLOGICAL SURGERY

## 2022-11-11 PROCEDURE — 6360000002 HC RX W HCPCS: Performed by: NEUROLOGICAL SURGERY

## 2022-11-11 PROCEDURE — 3700000001 HC ADD 15 MINUTES (ANESTHESIA): Performed by: NEUROLOGICAL SURGERY

## 2022-11-11 PROCEDURE — 2580000003 HC RX 258: Performed by: STUDENT IN AN ORGANIZED HEALTH CARE EDUCATION/TRAINING PROGRAM

## 2022-11-11 PROCEDURE — 63042 LAMINOTOMY SINGLE LUMBAR: CPT | Performed by: NEUROLOGICAL SURGERY

## 2022-11-11 PROCEDURE — 7100000000 HC PACU RECOVERY - FIRST 15 MIN: Performed by: NEUROLOGICAL SURGERY

## 2022-11-11 PROCEDURE — A4217 STERILE WATER/SALINE, 500 ML: HCPCS | Performed by: NEUROLOGICAL SURGERY

## 2022-11-11 PROCEDURE — 2500000003 HC RX 250 WO HCPCS: Performed by: NEUROLOGICAL SURGERY

## 2022-11-11 PROCEDURE — 6360000002 HC RX W HCPCS: Performed by: STUDENT IN AN ORGANIZED HEALTH CARE EDUCATION/TRAINING PROGRAM

## 2022-11-11 PROCEDURE — 2720000010 HC SURG SUPPLY STERILE: Performed by: NEUROLOGICAL SURGERY

## 2022-11-11 PROCEDURE — 3700000000 HC ANESTHESIA ATTENDED CARE: Performed by: NEUROLOGICAL SURGERY

## 2022-11-11 PROCEDURE — 2500000003 HC RX 250 WO HCPCS: Performed by: STUDENT IN AN ORGANIZED HEALTH CARE EDUCATION/TRAINING PROGRAM

## 2022-11-11 PROCEDURE — 7100000011 HC PHASE II RECOVERY - ADDTL 15 MIN: Performed by: NEUROLOGICAL SURGERY

## 2022-11-11 PROCEDURE — 3600000004 HC SURGERY LEVEL 4 BASE: Performed by: NEUROLOGICAL SURGERY

## 2022-11-11 PROCEDURE — 7100000010 HC PHASE II RECOVERY - FIRST 15 MIN: Performed by: NEUROLOGICAL SURGERY

## 2022-11-11 RX ORDER — MIDAZOLAM HYDROCHLORIDE 1 MG/ML
INJECTION INTRAMUSCULAR; INTRAVENOUS PRN
Status: DISCONTINUED | OUTPATIENT
Start: 2022-11-11 | End: 2022-11-11 | Stop reason: SDUPTHER

## 2022-11-11 RX ORDER — FENTANYL CITRATE 50 UG/ML
50 INJECTION, SOLUTION INTRAMUSCULAR; INTRAVENOUS EVERY 5 MIN PRN
Status: CANCELLED | OUTPATIENT
Start: 2022-11-11

## 2022-11-11 RX ORDER — FENTANYL CITRATE 50 UG/ML
50 INJECTION, SOLUTION INTRAMUSCULAR; INTRAVENOUS EVERY 5 MIN PRN
Status: DISCONTINUED | OUTPATIENT
Start: 2022-11-11 | End: 2022-11-11 | Stop reason: HOSPADM

## 2022-11-11 RX ORDER — DROPERIDOL 2.5 MG/ML
0.62 INJECTION, SOLUTION INTRAMUSCULAR; INTRAVENOUS
Status: DISCONTINUED | OUTPATIENT
Start: 2022-11-11 | End: 2022-11-11 | Stop reason: HOSPADM

## 2022-11-11 RX ORDER — PROPOFOL 10 MG/ML
INJECTION, EMULSION INTRAVENOUS PRN
Status: DISCONTINUED | OUTPATIENT
Start: 2022-11-11 | End: 2022-11-11 | Stop reason: SDUPTHER

## 2022-11-11 RX ORDER — LIDOCAINE HYDROCHLORIDE 20 MG/ML
INJECTION, SOLUTION INTRAVENOUS PRN
Status: DISCONTINUED | OUTPATIENT
Start: 2022-11-11 | End: 2022-11-11 | Stop reason: SDUPTHER

## 2022-11-11 RX ORDER — DEXAMETHASONE SODIUM PHOSPHATE 4 MG/ML
INJECTION, SOLUTION INTRA-ARTICULAR; INTRALESIONAL; INTRAMUSCULAR; INTRAVENOUS; SOFT TISSUE PRN
Status: DISCONTINUED | OUTPATIENT
Start: 2022-11-11 | End: 2022-11-11 | Stop reason: SDUPTHER

## 2022-11-11 RX ORDER — SODIUM CHLORIDE, SODIUM LACTATE, POTASSIUM CHLORIDE, CALCIUM CHLORIDE 600; 310; 30; 20 MG/100ML; MG/100ML; MG/100ML; MG/100ML
INJECTION, SOLUTION INTRAVENOUS CONTINUOUS
Status: DISCONTINUED | OUTPATIENT
Start: 2022-11-11 | End: 2022-11-11 | Stop reason: HOSPADM

## 2022-11-11 RX ORDER — MAGNESIUM HYDROXIDE 1200 MG/15ML
LIQUID ORAL CONTINUOUS PRN
Status: DISCONTINUED | OUTPATIENT
Start: 2022-11-11 | End: 2022-11-11 | Stop reason: HOSPADM

## 2022-11-11 RX ORDER — SODIUM CHLORIDE, SODIUM LACTATE, POTASSIUM CHLORIDE, CALCIUM CHLORIDE 600; 310; 30; 20 MG/100ML; MG/100ML; MG/100ML; MG/100ML
INJECTION, SOLUTION INTRAVENOUS CONTINUOUS PRN
Status: DISCONTINUED | OUTPATIENT
Start: 2022-11-11 | End: 2022-11-11 | Stop reason: SDUPTHER

## 2022-11-11 RX ORDER — SODIUM CHLORIDE 0.9 % (FLUSH) 0.9 %
5-40 SYRINGE (ML) INJECTION PRN
Status: DISCONTINUED | OUTPATIENT
Start: 2022-11-11 | End: 2022-11-11 | Stop reason: HOSPADM

## 2022-11-11 RX ORDER — FENTANYL CITRATE 50 UG/ML
INJECTION, SOLUTION INTRAMUSCULAR; INTRAVENOUS PRN
Status: DISCONTINUED | OUTPATIENT
Start: 2022-11-11 | End: 2022-11-11 | Stop reason: SDUPTHER

## 2022-11-11 RX ORDER — SODIUM CHLORIDE 0.9 % (FLUSH) 0.9 %
5-40 SYRINGE (ML) INJECTION EVERY 12 HOURS SCHEDULED
Status: DISCONTINUED | OUTPATIENT
Start: 2022-11-11 | End: 2022-11-11 | Stop reason: HOSPADM

## 2022-11-11 RX ORDER — DIPHENHYDRAMINE HYDROCHLORIDE 50 MG/ML
12.5 INJECTION INTRAMUSCULAR; INTRAVENOUS
Status: DISCONTINUED | OUTPATIENT
Start: 2022-11-11 | End: 2022-11-11 | Stop reason: HOSPADM

## 2022-11-11 RX ORDER — MEPERIDINE HYDROCHLORIDE 25 MG/ML
12.5 INJECTION INTRAMUSCULAR; INTRAVENOUS; SUBCUTANEOUS EVERY 5 MIN PRN
Status: DISCONTINUED | OUTPATIENT
Start: 2022-11-11 | End: 2022-11-11 | Stop reason: HOSPADM

## 2022-11-11 RX ORDER — ONDANSETRON 2 MG/ML
INJECTION INTRAMUSCULAR; INTRAVENOUS PRN
Status: DISCONTINUED | OUTPATIENT
Start: 2022-11-11 | End: 2022-11-11 | Stop reason: SDUPTHER

## 2022-11-11 RX ORDER — ROCURONIUM BROMIDE 10 MG/ML
INJECTION, SOLUTION INTRAVENOUS PRN
Status: DISCONTINUED | OUTPATIENT
Start: 2022-11-11 | End: 2022-11-11 | Stop reason: SDUPTHER

## 2022-11-11 RX ORDER — PROPOFOL 10 MG/ML
INJECTION, EMULSION INTRAVENOUS CONTINUOUS PRN
Status: DISCONTINUED | OUTPATIENT
Start: 2022-11-11 | End: 2022-11-11 | Stop reason: SDUPTHER

## 2022-11-11 RX ORDER — PROCHLORPERAZINE EDISYLATE 5 MG/ML
5 INJECTION INTRAMUSCULAR; INTRAVENOUS
Status: DISCONTINUED | OUTPATIENT
Start: 2022-11-11 | End: 2022-11-11 | Stop reason: HOSPADM

## 2022-11-11 RX ORDER — HYDRALAZINE HYDROCHLORIDE 20 MG/ML
10 INJECTION INTRAMUSCULAR; INTRAVENOUS
Status: DISCONTINUED | OUTPATIENT
Start: 2022-11-11 | End: 2022-11-11 | Stop reason: HOSPADM

## 2022-11-11 RX ORDER — HYDROCODONE BITARTRATE AND ACETAMINOPHEN 5; 325 MG/1; MG/1
1 TABLET ORAL EVERY 6 HOURS PRN
Qty: 120 TABLET | Refills: 0 | Status: SHIPPED | OUTPATIENT
Start: 2022-11-11 | End: 2022-12-11

## 2022-11-11 RX ORDER — SODIUM CHLORIDE 9 MG/ML
INJECTION, SOLUTION INTRAVENOUS PRN
Status: DISCONTINUED | OUTPATIENT
Start: 2022-11-11 | End: 2022-11-11 | Stop reason: HOSPADM

## 2022-11-11 RX ORDER — LIDOCAINE HYDROCHLORIDE 10 MG/ML
1 INJECTION, SOLUTION EPIDURAL; INFILTRATION; INTRACAUDAL; PERINEURAL
Status: DISCONTINUED | OUTPATIENT
Start: 2022-11-11 | End: 2022-11-11 | Stop reason: HOSPADM

## 2022-11-11 RX ADMIN — FENTANYL CITRATE 50 MCG: 50 INJECTION, SOLUTION INTRAMUSCULAR; INTRAVENOUS at 10:08

## 2022-11-11 RX ADMIN — PROPOFOL 200 MCG/KG/MIN: 10 INJECTION, EMULSION INTRAVENOUS at 08:47

## 2022-11-11 RX ADMIN — DEXAMETHASONE SODIUM PHOSPHATE 8 MG: 4 INJECTION, SOLUTION INTRAMUSCULAR; INTRAVENOUS at 09:10

## 2022-11-11 RX ADMIN — MIDAZOLAM HYDROCHLORIDE 2 MG: 1 INJECTION, SOLUTION INTRAMUSCULAR; INTRAVENOUS at 09:06

## 2022-11-11 RX ADMIN — ROCURONIUM BROMIDE 20 MG: 10 INJECTION INTRAVENOUS at 09:37

## 2022-11-11 RX ADMIN — FENTANYL CITRATE 100 MCG: 50 INJECTION, SOLUTION INTRAMUSCULAR; INTRAVENOUS at 09:06

## 2022-11-11 RX ADMIN — SODIUM CHLORIDE, POTASSIUM CHLORIDE, SODIUM LACTATE AND CALCIUM CHLORIDE: 600; 310; 30; 20 INJECTION, SOLUTION INTRAVENOUS at 08:47

## 2022-11-11 RX ADMIN — SODIUM CHLORIDE, POTASSIUM CHLORIDE, SODIUM LACTATE AND CALCIUM CHLORIDE 1000 ML: 600; 310; 30; 20 INJECTION, SOLUTION INTRAVENOUS at 08:08

## 2022-11-11 RX ADMIN — SUGAMMADEX 200 MG: 100 INJECTION, SOLUTION INTRAVENOUS at 11:13

## 2022-11-11 RX ADMIN — FENTANYL CITRATE 50 MCG: 50 INJECTION, SOLUTION INTRAMUSCULAR; INTRAVENOUS at 12:42

## 2022-11-11 RX ADMIN — LIDOCAINE HYDROCHLORIDE 60 MG: 20 INJECTION, SOLUTION INTRAVENOUS at 08:47

## 2022-11-11 RX ADMIN — ONDANSETRON 4 MG: 2 INJECTION INTRAMUSCULAR; INTRAVENOUS at 11:04

## 2022-11-11 RX ADMIN — ROCURONIUM BROMIDE 20 MG: 10 INJECTION INTRAVENOUS at 10:32

## 2022-11-11 RX ADMIN — FENTANYL CITRATE 50 MCG: 50 INJECTION, SOLUTION INTRAMUSCULAR; INTRAVENOUS at 11:03

## 2022-11-11 RX ADMIN — ROCURONIUM BROMIDE 50 MG: 10 INJECTION INTRAVENOUS at 08:47

## 2022-11-11 RX ADMIN — PROPOFOL 200 MG: 10 INJECTION, EMULSION INTRAVENOUS at 08:47

## 2022-11-11 RX ADMIN — VANCOMYCIN HYDROCHLORIDE 1000 MG: 1 INJECTION, POWDER, LYOPHILIZED, FOR SOLUTION INTRAVENOUS at 08:09

## 2022-11-11 ASSESSMENT — PAIN SCALES - GENERAL
PAINLEVEL_OUTOF10: 7
PAINLEVEL_OUTOF10: 0
PAINLEVEL_OUTOF10: 0
PAINLEVEL_OUTOF10: 4

## 2022-11-11 ASSESSMENT — PAIN DESCRIPTION - PAIN TYPE: TYPE: SURGICAL PAIN

## 2022-11-11 ASSESSMENT — PAIN DESCRIPTION - ORIENTATION: ORIENTATION: LOWER

## 2022-11-11 ASSESSMENT — PAIN DESCRIPTION - DESCRIPTORS
DESCRIPTORS: ACHING;BURNING;SORE
DESCRIPTORS: BURNING
DESCRIPTORS: PRESSURE

## 2022-11-11 ASSESSMENT — PAIN DESCRIPTION - LOCATION
LOCATION: BACK
LOCATION: BACK

## 2022-11-11 ASSESSMENT — PAIN - FUNCTIONAL ASSESSMENT: PAIN_FUNCTIONAL_ASSESSMENT: 0-10

## 2022-11-11 NOTE — ANESTHESIA POSTPROCEDURE EVALUATION
Department of Anesthesiology  Postprocedure Note    Patient: Kenney Stahl  MRN: [id number]  [place]: 1985  Date of evaluation: 11/11/2022      Procedure Summary     Date: 11/11/22 Room / Location: [address]    Anesthesia Start: 4579 Anesthesia Stop: 1134    Procedure: RIGHT L4-5 MICRODISKECTOMY (Right) Diagnosis:       Herniated nucleus pulposus, L4-5 right      (RIGHT L4-5 HNP)    Surgeons: Randi Fleming MD Responsible Provider: Sophia Mclean DO    Anesthesia Type: TIVA ASA Status: 2          Anesthesia Type: TIVA    Aniya Phase I: Aniya Score: 10    Aniya Phase II:        Anesthesia Post Evaluation    Patient location during evaluation: PACU  Patient participation: complete - patient cannot participate  Level of consciousness: awake and alert  Airway patency: patent  Nausea & Vomiting: no nausea and no vomiting  Complications: no  Cardiovascular status: blood pressure returned to baseline  Respiratory status: room air, spontaneous ventilation and acceptable  Hydration status: stable  Multimodal analgesia pain management approach

## 2022-11-11 NOTE — BRIEF OP NOTE
Brief Postoperative Note      Patient: Elva Mcmahan  YOB: 1985  MRN: [id number]    Date of Procedure: 11/11/2022    Pre-Op Diagnosis: RIGHT L4-5 HNP    Post-Op Diagnosis: Same       Procedure(s):  RIGHT L4-5 MICRODISKECTOMY    Surgeon(s):  Tabatha Whitney MD    Assistant:  First Assistant: Flaquita Michaels    Anesthesia: General    Estimated Blood Loss (mL): Minimal    Complications: None        Findings: Scarring From previous surgery and a large inferiorly migrated right L4-5 extruded disc    Electronically signed by Divine Mancini MD on 11/11/2022 at 11:07 AM

## 2022-11-11 NOTE — ANESTHESIA PRE PROCEDURE
Department of Anesthesiology  Preprocedure Note       Name:  Lizeth Palmer   Age:  40 y.o.  :  1985                                          MRN:  [id number]         Date:  2022      Surgeon: Chanel Hurtado):  Garcia Quinn MD    Procedure: Procedure(s):  RIGHT L4-5 MICRODISKECTOMY **ALLERGY TO HALOGEN ANESTHETICS**/ 1 HR/ 1 C-ARM/ ROOM 1/ [facility]    Medications prior to admission:   Prior to Admission medications    Medication Sig Start Date End Date Taking? Authorizing Provider   gabapentin (NEURONTIN) 300 MG capsule Take 1 capsule by mouth 3 times daily for 30 days. 22  PARKER Rolon CNP   cyclobenzaprine (FLEXERIL) 10 MG tablet Take 1 tablet by mouth 3 times daily as needed for Muscle spasms 22  PARKER Rolon CNP   lidocaine (LIDODERM) 5 % Place 1 patch onto the skin daily 12 hours on, 12 hours off. 10/24/22 11/23/22  Mary Osei PA-C   predniSONE (DELTASONE) 10 MG tablet Take 5 tabs daily x 3 days, 4 tabs daily x 3 days, 3 tabs daily x 3 days, 2 tabs daily x 3 days, 1 tab daily x 3 days 10/23/22   SAIGE Constantino   etonogestrel (NEXPLANON) 68 MG implant 68 mg by Subdermal route once for 1 dose 10/18/22 10/18/22  Carlos Quesada DO   HYDROcodone-acetaminophen (NORCO) 5-325 MG per tablet Take 1 tablet by mouth every 6 hours as needed for Pain.     Historical Provider, MD   QULIPTA 60 MG TABS TAKE 1 TABLET BY MOUTH EVERY DAY 22   Historical Provider, MD   metoclopramide (REGLAN) 10 MG tablet Take 1 tablet by mouth 4 times daily as needed (headache) 3/30/22   Karen Hinds DO   pantoprazole (PROTONIX) 40 MG tablet Take 1 tablet by mouth every morning (before breakfast) 2/14/22 10/31/22  Carlos Quesada DO       Current medications:    Current Facility-Administered Medications   Medication Dose Route Frequency Provider Last Rate Last Admin    vancomycin 1000 mg IVPB in 250 mL D5W addavial  1,000 mg IntraVENous On Call to [address] Francine Garnica MD        lactated ringers infusion   IntraVENous Continuous Naranjito Proffer, DO           Allergies:     Allergies   Allergen Reactions    Amoxicillin Hives    Anesthetics, Halogenated      Hard to come out of it, hyperventilating, convulsion, nonresponsive    Oxycodone Nausea Only    Tramadol     Oxycodone-Acetaminophen Hives and Nausea And Vomiting    Topiramate Rash       Problem List:    Patient Active Problem List   Diagnosis Code    Allergy status to penicillin Z88.0    Anesthesia of skin R20.0    Gastroesophageal reflux disease K21.9    Other specified postprocedural states Z98.890    Bipolar affective disorder (HCC) F31.9    Cervical radiculopathy M54.12    Herniated nucleus pulposus, L4-5 right M51.26    Eczema L30.9    Cervicogenic headache G44.86    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    Intractable migraine without aura G43.019    Low back pain M54.50    Multiple benign nevi D22.9    Postoperative pain G89.18    Tinea versicolor B36.0    New daily persistent headache G44.52    Paresthesia of upper and lower extremities of both sides R20.2    S/P lumbar laminectomy Z98.890    Papilledema associated with increased intracranial pressure H47.11    Pseudotumor cerebri G93.2    Lumbar radiculopathy M54.16    Chronic migraine without aura without status migrainosus, not intractable G43.709    Abnormal finding on thyroid function test R94.6    Abnormal uterine bleeding N93.9    Acute bronchitis J20.9    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    Attention deficit hyperactivity disorder (ADHD) F90.9    Exercise-induced asthma J45.990    Insomnia G47.00    Photopsia H53.19    Intractable migraine without aura and with status migrainosus G43.011    Smoker F17.200    Von Willebrand disease D68.00    Pre-op exam Z01.818       Past Medical History:        Diagnosis Date    Asthma     exercise induced       Past Surgical History:        Procedure Laterality Date  BACK SURGERY      2006    BACK SURGERY  08/05/2021    BREAST ENHANCEMENT SURGERY      COSMETIC SURGERY      breast implants    FINGER SURGERY      age 4    LIPOSUCTION         Social History:    Social History     Tobacco Use    Smoking status: Every Day     Packs/day: 0.50     Years: 19.00     Pack years: 9.50     Types: Cigarettes    Smokeless tobacco: Never    Tobacco comments:     started age 13   Substance Use Topics    Alcohol use: Yes     Alcohol/week: 0.0 standard drinks     Comment: ocassionally                                Ready to quit: Not Answered  Counseling given: Not Answered  Tobacco comments: started age 13      Vital Signs (Current):   Vitals:    11/11/22 0745   BP: 124/79   Pulse: (!) 103   Resp: 16   Temp: 97.4 °F (36.3 °C)   TempSrc: Temporal   SpO2: 99%   Weight: 160 lb (72.6 kg)   Height: 5' 2\" (1.575 m)                                              BP Readings from Last 3 Encounters:   11/11/22 124/79   11/07/22 122/74   10/31/22 133/77       NPO Status: Time of last liquid consumption: 0500 (2 sips)                        Time of last solid consumption: 2000                        Date of last liquid consumption: 11/11/22                        Date of last solid food consumption: 11/10/22    BMI:   Wt Readings from Last 3 Encounters:   11/11/22 160 lb (72.6 kg)   11/07/22 160 lb 3.2 oz (72.7 kg)   10/31/22 165 lb 3.2 oz (74.9 kg)     Body mass index is 29.26 kg/m².     CBC:   Lab Results   Component Value Date/Time    WBC 9.0 10/24/2022 12:01 PM    RBC 4.33 10/24/2022 12:01 PM    HGB 13.0 10/24/2022 12:01 PM    HCT 39.0 10/24/2022 12:01 PM    MCV 90.0 10/24/2022 12:01 PM    RDW 14.0 10/24/2022 12:01 PM     10/24/2022 12:01 PM       CMP:   Lab Results   Component Value Date/Time     10/24/2022 12:01 PM    K 3.3 10/24/2022 12:01 PM     10/24/2022 12:01 PM    CO2 26 10/24/2022 12:01 PM    BUN 11 10/24/2022 12:01 PM    CREATININE 0.55 10/24/2022 12:01 PM GFRAA >60.0 04/05/2022 09:17 AM    LABGLOM >60.0 10/24/2022 12:01 PM    GLUCOSE 99 10/24/2022 12:01 PM    PROT 7.6 10/24/2022 12:01 PM    CALCIUM 9.3 10/24/2022 12:01 PM    BILITOT <0.2 10/24/2022 12:01 PM    ALKPHOS 78 10/24/2022 12:01 PM    AST 16 10/24/2022 12:01 PM    ALT 19 10/24/2022 12:01 PM       POC Tests: No results for input(s): POCGLU, POCNA, POCK, POCCL, POCBUN, POCHEMO, POCHCT in the last 72 hours. Coags:   Lab Results   Component Value Date/Time    PROTIME 13.8 11/07/2022 03:57 PM    INR 1.1 11/07/2022 03:57 PM    APTT 32.9 11/07/2022 03:57 PM       HCG (If Applicable):   Lab Results   Component Value Date    PREGTESTUR NEGATIVE 10/31/2022        ABGs: No results found for: PHART, PO2ART, ZMB3IMU, VVQ1CSB, BEART, M4BMXXJL     Type & Screen (If Applicable):  No results found for: LABABO, LABRH    Drug/Infectious Status (If Applicable):  No results found for: HIV, HEPCAB    COVID-19 Screening (If Applicable): No results found for: COVID19        Anesthesia Evaluation    Airway: Mallampati: II  TM distance: >3 FB   Neck ROM: full  Mouth opening: > = 3 FB   Dental: normal exam         Pulmonary:normal exam    (+) asthma:                            Cardiovascular:Negative CV ROS                      Neuro/Psych:   (+) headaches:,             GI/Hepatic/Renal:   (+) GERD:,           Endo/Other: Negative Endo/Other ROS                    Abdominal:             Vascular: negative vascular ROS. Other Findings:           Anesthesia Plan      general     ASA 2     (ETT)  Induction: intravenous. MIPS: Postoperative opioids intended and Prophylactic antiemetics administered. Anesthetic plan and risks discussed with patient.                         Rae Porras DO   11/11/2022

## 2022-11-11 NOTE — OP NOTE
[address]                      [address] [facility], [address] [facility]                                OPERATIVE REPORT    PATIENT NAME: Radha Chavez                   :        1985  MED REC NO:   [id number]                            ROOM:  ACCOUNT NO:   [de-identified]                           ADMIT DATE: 2022  PROVIDER:     Cristela Girard MD    DATE OF PROCEDURE:  2022    PREOPERATIVE DIAGNOSIS:  Right L4-5 inferiorly migrated extruded disc,  prior laminectomy. POSTOPERATIVE DIAGNOSIS:  Right L4-5 inferiorly migrated extruded disc,  prior laminectomy. OPERATIONS PERFORMED:  Right L4-5 lysis of scar, microdissection,  diskectomy. SURGEON:  Cristela Girard MD    INDICATION:  Severe back and right lower extremity pain. DESCRIPTION OF PROCEDURE:  The patient was given general endotracheal  anesthesia in supine position. Vancomycin IV preoperatively. She was  turned to prone position watching all pressure points. Back was prepped  and draped. The patient had a scar on her back. Remote history of  prior laminectomy. Details unknown. Needle was placed. Lateral x-rays were obtained to confirm level. Needle was withdrawn. Skin was infiltrated with lidocaine with  epinephrine. I could palpate the spinous process of L3, but the spinous  processes of L4 and L5 had been previously surgically removed. An  incision was made just to the right side of spinous process of inferior  L3 and then inferiorly to the lateral aspect of S1. Dissection was  carried down through subcutaneous tissue. I began to encounter scarring  in the subcutaneous tissue and fascia. I was able to identify the  inferior aspect of the L3 spinous process. This spinous process and  lamina was followed laterally until I could identify the facet joint of  L3-4. The L4-5 joint was encased in scar.   Identified the spinous  process of S1 and very gently on the right side, she was able to expose  the spinous process and the lamina of S1, which then allowed me to  identify the lamina of L5. The junction of the L4-5 facet joint was  then identified again very carefully lysing and removing the scar from  the posterior aspect of the facet and interlaminar. Needles were  placed. Lateral x-rays were obtained to confirm level. Needle were  withdrawn. The most inferior aspect of the residual L4 lamina was  identified. A partial hemilaminectomy of the inferior aspect of L4 was  performed working superiorly not laterally, performing a small medial  facetectomy of L4 and L5 facet joint. The pedicle of L5 was identified  and a superior hemilaminectomy of L5 was performed as the preoperative  MRI study showed this inferiorly migrated extruded disc in the axilla of  the L5 nerve root. Once this was done the interlaminar ligament was  removed laterally to expose the lateral dural sac at L4-5 and the L5  nerve root going around the medial aspect the L5 pedicle. Very gently  microdissection was used to go into the medial aspect of the L5 nerve  root or the axilla and a very large disc fragment was found. With the  micropuncture several large extruded disc fragments were removed  completely decompressing the dural sac as well as the medial aspect of  the L5 nerve root. Very gently the dural sac was mobilized medially at  the takeoff of the L5 nerve root to identify the L4-5 disc. It was  protruded. I entered the disc space removing the disc space of all the  degenerated disc material to reduce the chances for recurrence. I swept  with the micro-instrumentation anterior to the dural sac and the L5  nerve root and the neural elements were completely decompressed. The  wound was thoroughly irrigated. Some Surgiflo was placed. Wound was  irrigated. Vancomycin powder was applied. Retractor removed. Fascia  was closed with 0 Vicryl suture.   Subcutaneous tissues were closed with  2-0 and 3-0 Vicryl suture. EBL less than 10 mL. No blood transfused.         Lisa Pugh MD    D: 11/11/2022 11:06:10       T: 11/11/2022 11:10:08     [email or web address]  Job#: [id number]     Doc#: [id number]    CC:

## 2022-11-11 NOTE — ADDENDUM NOTE
Addendum  created 11/11/22 1140 by Zackary Mcgrath,     Order list changed, Order sets accessed, Pharmacy for encounter modified
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
## 2022-11-11 NOTE — DISCHARGE INSTRUCTIONS
Every day change dressing frequently to keep wound clean and dry using 4X4 gauze pads and paper tape. May shower in 3 days. Do not soak or soap wound for one week. Discharge prescriptions e-prescribed to pharmacy. Order done  as outpatient. Recheck one month. Questions call office [phone].
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
## 2022-11-16 ENCOUNTER — TELEPHONE (OUTPATIENT)
Dept: FAMILY MEDICINE CLINIC | Age: 37
End: 2022-11-16

## 2022-11-16 NOTE — TELEPHONE ENCOUNTER
[facility] hematology and oncology called about the referral to Dr. Virginia Arboleda. They called the patient to schedule and she declined. Thank you.

## 2022-11-21 ENCOUNTER — OFFICE VISIT (OUTPATIENT)
Dept: FAMILY MEDICINE CLINIC | Age: 37
End: 2022-11-21
Payer: COMMERCIAL

## 2022-11-21 VITALS
HEART RATE: 95 BPM | BODY MASS INDEX: 29.63 KG/M2 | HEIGHT: 62 IN | DIASTOLIC BLOOD PRESSURE: 70 MMHG | TEMPERATURE: 98.3 F | WEIGHT: 161 LBS | OXYGEN SATURATION: 99 % | SYSTOLIC BLOOD PRESSURE: 116 MMHG

## 2022-11-21 DIAGNOSIS — N92.0 MENORRHAGIA WITH REGULAR CYCLE: Primary | ICD-10-CM

## 2022-11-21 DIAGNOSIS — M51.26 LUMBAR DISC HERNIATION: ICD-10-CM

## 2022-11-21 DIAGNOSIS — R11.0 NAUSEA: ICD-10-CM

## 2022-11-21 PROCEDURE — G8482 FLU IMMUNIZE ORDER/ADMIN: HCPCS | Performed by: STUDENT IN AN ORGANIZED HEALTH CARE EDUCATION/TRAINING PROGRAM

## 2022-11-21 PROCEDURE — G8417 CALC BMI ABV UP PARAM F/U: HCPCS | Performed by: STUDENT IN AN ORGANIZED HEALTH CARE EDUCATION/TRAINING PROGRAM

## 2022-11-21 PROCEDURE — 4004F PT TOBACCO SCREEN RCVD TLK: CPT | Performed by: STUDENT IN AN ORGANIZED HEALTH CARE EDUCATION/TRAINING PROGRAM

## 2022-11-21 PROCEDURE — G8427 DOCREV CUR MEDS BY ELIG CLIN: HCPCS | Performed by: STUDENT IN AN ORGANIZED HEALTH CARE EDUCATION/TRAINING PROGRAM

## 2022-11-21 PROCEDURE — 99213 OFFICE O/P EST LOW 20 MIN: CPT | Performed by: STUDENT IN AN ORGANIZED HEALTH CARE EDUCATION/TRAINING PROGRAM

## 2022-11-21 ASSESSMENT — ENCOUNTER SYMPTOMS
VOMITING: 0
ABDOMINAL PAIN: 0
WHEEZING: 0
SHORTNESS OF BREATH: 0
SORE THROAT: 0
NAUSEA: 0
ABDOMINAL DISTENTION: 0
BACK PAIN: 1
COUGH: 0
DIARRHEA: 0
RHINORRHEA: 0
EYE DISCHARGE: 0
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
## 2022-11-22 RX ORDER — ATOGEPANT 60 MG/1
60 TABLET ORAL DAILY
Qty: 12 TABLET | Refills: 0 | COMMUNITY
Start: 2022-11-22

## 2022-11-29 ENCOUNTER — TELEPHONE (OUTPATIENT)
Dept: NEUROSURGERY | Age: 37
End: 2022-11-29

## 2022-11-29 ENCOUNTER — OFFICE VISIT (OUTPATIENT)
Dept: PAIN MANAGEMENT | Age: 37
End: 2022-11-29

## 2022-11-29 VITALS
DIASTOLIC BLOOD PRESSURE: 68 MMHG | HEART RATE: 94 BPM | WEIGHT: 160 LBS | SYSTOLIC BLOOD PRESSURE: 120 MMHG | OXYGEN SATURATION: 99 % | BODY MASS INDEX: 29.44 KG/M2 | HEIGHT: 62 IN

## 2022-11-29 DIAGNOSIS — Z48.89 ENCOUNTER FOR POST SURGICAL WOUND CHECK: Primary | ICD-10-CM

## 2022-11-29 PROCEDURE — 99024 POSTOP FOLLOW-UP VISIT: CPT | Performed by: NURSE PRACTITIONER

## 2022-11-29 NOTE — TELEPHONE ENCOUNTER
Patient and her mother, Davis Reynolds, are requesting a letter faxed to [phone] excusing Gabbyle Pulse from work 11- until 12-5-2022 to take care of patient post op. They state Dr. Jo Ann Mcnulty told them Gabbyle Pulse could take as long as she needed off in order to care/assist patient after surgery. Will Dr. Jo Ann Mcnulty excuse patient's mother from work for the requested time period? [facility] - 11/11/2022 - RIGHT L 4-5 MICRODISKECTOMY    Once letter is faxed, Yris Garcia would like a phone call informing her it has been done.
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
## 2022-12-07 PROBLEM — Z01.818 PRE-OP EXAM: Status: RESOLVED | Noted: 2022-11-07 | Resolved: 2022-12-07

## 2022-12-07 NOTE — PROGRESS NOTES
Patient Name: Regi Mace : 1985        Date: 2022      Type of Appt: Post OP    Reason for appt: POST OP:  [facility] - 2022 - RIGHT L 4-5 [facility]  Neurosurgery and Pain Management [address] [facility]: [phone]  F: [phone]      Patient: Regi Mace  YOB: 1985  Date: 2022    The patient is a 40 y.o. female who presents today for follow up. Postop with much less pain into the right lower extremity. Sometimes when she bends over she feels a pulling on the left side that she had not noticed previously but not severe. She is functional and able to walk and be active. Sometimes when she walks she leans to the right side. Preop severe migraine headaches which improved postop but they are starting to come back. Suggest that she see her family physician for evaluation treatment headaches. Exam shows she is able to walk does lean to the right side. Able to support weight on both heels and toes. Minimal weakness on the right side. Decreased sensation medial aspect right foot L5 distribution. Wound is well-healed. Discussed increasing her activities as tolerated start physical therapy if she has any questions or problems she will contact the office.         Edgar Hollins MD

## 2022-12-13 ENCOUNTER — OFFICE VISIT (OUTPATIENT)
Dept: NEUROSURGERY | Age: 37
End: 2022-12-13

## 2022-12-13 VITALS — BODY MASS INDEX: 29.26 KG/M2 | WEIGHT: 159 LBS | TEMPERATURE: 97.9 F | HEIGHT: 62 IN

## 2022-12-13 DIAGNOSIS — D68.00 VON WILLEBRAND DISEASE: ICD-10-CM

## 2022-12-13 DIAGNOSIS — M51.26 HERNIATED NUCLEUS PULPOSUS, L4-5 RIGHT: Primary | ICD-10-CM

## 2022-12-13 PROCEDURE — 99024 POSTOP FOLLOW-UP VISIT: CPT | Performed by: NEUROLOGICAL SURGERY

## 2023-01-10 ENCOUNTER — OFFICE VISIT (OUTPATIENT)
Dept: GASTROENTEROLOGY | Age: 38
End: 2023-01-10
Payer: COMMERCIAL

## 2023-01-10 VITALS — OXYGEN SATURATION: 100 % | BODY MASS INDEX: 29.63 KG/M2 | HEART RATE: 64 BPM | HEIGHT: 62 IN | WEIGHT: 161 LBS

## 2023-01-10 DIAGNOSIS — R19.4 CHANGE IN BOWEL HABITS: ICD-10-CM

## 2023-01-10 DIAGNOSIS — K21.9 GASTROESOPHAGEAL REFLUX DISEASE, UNSPECIFIED WHETHER ESOPHAGITIS PRESENT: Primary | ICD-10-CM

## 2023-01-10 DIAGNOSIS — Z86.010 HISTORY OF COLON POLYPS: ICD-10-CM

## 2023-01-10 PROCEDURE — G8417 CALC BMI ABV UP PARAM F/U: HCPCS | Performed by: INTERNAL MEDICINE

## 2023-01-10 PROCEDURE — 4004F PT TOBACCO SCREEN RCVD TLK: CPT | Performed by: INTERNAL MEDICINE

## 2023-01-10 PROCEDURE — G8482 FLU IMMUNIZE ORDER/ADMIN: HCPCS | Performed by: INTERNAL MEDICINE

## 2023-01-10 PROCEDURE — G8427 DOCREV CUR MEDS BY ELIG CLIN: HCPCS | Performed by: INTERNAL MEDICINE

## 2023-01-10 PROCEDURE — 99203 OFFICE O/P NEW LOW 30 MIN: CPT | Performed by: INTERNAL MEDICINE
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
## 2023-02-16 ENCOUNTER — OFFICE VISIT (OUTPATIENT)
Dept: FAMILY MEDICINE CLINIC | Age: 38
End: 2023-02-16

## 2023-02-16 VITALS
HEART RATE: 94 BPM | DIASTOLIC BLOOD PRESSURE: 62 MMHG | TEMPERATURE: 97.8 F | OXYGEN SATURATION: 98 % | BODY MASS INDEX: 30.71 KG/M2 | WEIGHT: 165.2 LBS | SYSTOLIC BLOOD PRESSURE: 118 MMHG

## 2023-02-16 DIAGNOSIS — R35.0 URINARY FREQUENCY: ICD-10-CM

## 2023-02-16 DIAGNOSIS — K21.9 GASTROESOPHAGEAL REFLUX DISEASE, UNSPECIFIED WHETHER ESOPHAGITIS PRESENT: Primary | ICD-10-CM

## 2023-02-16 DIAGNOSIS — M51.26 LUMBAR HERNIATED DISC: ICD-10-CM

## 2023-02-16 DIAGNOSIS — R53.83 FATIGUE, UNSPECIFIED TYPE: ICD-10-CM

## 2023-02-16 LAB
ALBUMIN SERPL-MCNC: 4.8 G/DL (ref 3.5–4.6)
ALP BLD-CCNC: 78 U/L (ref 40–130)
ALT SERPL-CCNC: 17 U/L (ref 0–33)
ANION GAP SERPL CALCULATED.3IONS-SCNC: 14 MEQ/L (ref 9–15)
AST SERPL-CCNC: 21 U/L (ref 0–35)
BASOPHILS ABSOLUTE: 0.1 K/UL (ref 0–0.2)
BASOPHILS RELATIVE PERCENT: 0.7 %
BILIRUB SERPL-MCNC: 0.3 MG/DL (ref 0.2–0.7)
BILIRUBIN, POC: NORMAL
BLOOD URINE, POC: NORMAL
BUN BLDV-MCNC: 8 MG/DL (ref 6–20)
CALCIUM SERPL-MCNC: 9.8 MG/DL (ref 8.5–9.9)
CHLORIDE BLD-SCNC: 106 MEQ/L (ref 95–107)
CLARITY, POC: CLEAR
CO2: 23 MEQ/L (ref 20–31)
COLOR, POC: YELLOW
CREAT SERPL-MCNC: 0.65 MG/DL (ref 0.5–0.9)
EOSINOPHILS ABSOLUTE: 0 K/UL (ref 0–0.7)
EOSINOPHILS RELATIVE PERCENT: 0.4 %
GFR SERPL CREATININE-BSD FRML MDRD: >60 ML/MIN/{1.73_M2}
GLOBULIN: 3.1 G/DL (ref 2.3–3.5)
GLUCOSE BLD-MCNC: 80 MG/DL (ref 70–99)
GLUCOSE URINE, POC: NORMAL
HBA1C MFR BLD: 5.3 % (ref 4.8–5.9)
HCT VFR BLD CALC: 42.3 % (ref 37–47)
HEMOGLOBIN: 14 G/DL (ref 12–16)
KETONES, POC: NORMAL
LEUKOCYTE EST, POC: NORMAL
LYMPHOCYTES ABSOLUTE: 2.7 K/UL (ref 1–4.8)
LYMPHOCYTES RELATIVE PERCENT: 28.4 %
MCH RBC QN AUTO: 29.5 PG (ref 27–31.3)
MCHC RBC AUTO-ENTMCNC: 33.2 % (ref 33–37)
MCV RBC AUTO: 88.9 FL (ref 79.4–94.8)
MONOCYTES ABSOLUTE: 0.5 K/UL (ref 0.2–0.8)
MONOCYTES RELATIVE PERCENT: 5.5 %
NEUTROPHILS ABSOLUTE: 6.2 K/UL (ref 1.4–6.5)
NEUTROPHILS RELATIVE PERCENT: 65 %
NITRITE, POC: NORMAL
PDW BLD-RTO: 14.8 % (ref 11.5–14.5)
PH, POC: 6
PLATELET # BLD: 335 K/UL (ref 130–400)
POTASSIUM SERPL-SCNC: 4.3 MEQ/L (ref 3.4–4.9)
PROTEIN, POC: NORMAL
RBC # BLD: 4.76 M/UL (ref 4.2–5.4)
SODIUM BLD-SCNC: 143 MEQ/L (ref 135–144)
SPECIFIC GRAVITY, POC: 1.01
TOTAL PROTEIN: 7.9 G/DL (ref 6.3–8)
TSH REFLEX: 0.88 UIU/ML (ref 0.44–3.86)
UROBILINOGEN, POC: NORMAL
WBC # BLD: 9.6 K/UL (ref 4.8–10.8)

## 2023-02-16 RX ORDER — PANTOPRAZOLE SODIUM 40 MG/1
40 TABLET, DELAYED RELEASE ORAL
Qty: 30 TABLET | Refills: 3 | Status: SHIPPED | OUTPATIENT
Start: 2023-02-16 | End: 2023-03-18

## 2023-02-16 SDOH — ECONOMIC STABILITY: FOOD INSECURITY: WITHIN THE PAST 12 MONTHS, YOU WORRIED THAT YOUR FOOD WOULD RUN OUT BEFORE YOU GOT MONEY TO BUY MORE.: NEVER TRUE

## 2023-02-16 SDOH — ECONOMIC STABILITY: HOUSING INSECURITY
IN THE LAST 12 MONTHS, WAS THERE A TIME WHEN YOU DID NOT HAVE A STEADY PLACE TO SLEEP OR SLEPT IN A SHELTER (INCLUDING NOW)?: NO

## 2023-02-16 SDOH — ECONOMIC STABILITY: FOOD INSECURITY: WITHIN THE PAST 12 MONTHS, THE FOOD YOU BOUGHT JUST DIDN'T LAST AND YOU DIDN'T HAVE MONEY TO GET MORE.: NEVER TRUE

## 2023-02-16 SDOH — ECONOMIC STABILITY: INCOME INSECURITY: HOW HARD IS IT FOR YOU TO PAY FOR THE VERY BASICS LIKE FOOD, HOUSING, MEDICAL CARE, AND HEATING?: NOT HARD AT ALL

## 2023-02-16 ASSESSMENT — PATIENT HEALTH QUESTIONNAIRE - PHQ9
5. POOR APPETITE OR OVEREATING: 1
6. FEELING BAD ABOUT YOURSELF - OR THAT YOU ARE A FAILURE OR HAVE LET YOURSELF OR YOUR FAMILY DOWN: 0
SUM OF ALL RESPONSES TO PHQ9 QUESTIONS 1 & 2: 0
SUM OF ALL RESPONSES TO PHQ QUESTIONS 1-9: 3
3. TROUBLE FALLING OR STAYING ASLEEP: 1
8. MOVING OR SPEAKING SO SLOWLY THAT OTHER PEOPLE COULD HAVE NOTICED. OR THE OPPOSITE, BEING SO FIGETY OR RESTLESS THAT YOU HAVE BEEN MOVING AROUND A LOT MORE THAN USUAL: 0
SUM OF ALL RESPONSES TO PHQ QUESTIONS 1-9: 3
1. LITTLE INTEREST OR PLEASURE IN DOING THINGS: 0
9. THOUGHTS THAT YOU WOULD BE BETTER OFF DEAD, OR OF HURTING YOURSELF: 0
4. FEELING TIRED OR HAVING LITTLE ENERGY: 1
SUM OF ALL RESPONSES TO PHQ QUESTIONS 1-9: 3
10. IF YOU CHECKED OFF ANY PROBLEMS, HOW DIFFICULT HAVE THESE PROBLEMS MADE IT FOR YOU TO DO YOUR WORK, TAKE CARE OF THINGS AT HOME, OR GET ALONG WITH OTHER PEOPLE: 0
SUM OF ALL RESPONSES TO PHQ QUESTIONS 1-9: 3
7. TROUBLE CONCENTRATING ON THINGS, SUCH AS READING THE NEWSPAPER OR WATCHING TELEVISION: 0
2. FEELING DOWN, DEPRESSED OR HOPELESS: 0

## 2023-02-16 ASSESSMENT — ENCOUNTER SYMPTOMS
SHORTNESS OF BREATH: 0
RESPIRATORY NEGATIVE: 1
BLOOD IN STOOL: 0
WHEEZING: 0
COUGH: 0

## 2023-02-16 NOTE — PROGRESS NOTES
[facility]  [address] [facility] [address]  Dept: [phone]  Dept Fax: [phone]  Loc: [address] [place] [address] (: 1985) is a 41 y.o. female, Established patient, here for evaluation of the following chief complaint(s):  Incontinence (Started about 6 weeks ago./Has happened about 3 times. ), Urinary Urgency (For about 6 months), Back Pain (Has had 3 surgeries for lower back. Pain present daily. ), and Headache Dresser Xenia )      PCP:  DO TIMMY Hickman    Goes to PT for back pain. Last back procedure was 23, then 2021. Patient had surgeries of lumbar area. Patient having frequency in urination for 6 months. Then in January started with incontinence. No burning pain. No abdominal pain. Review of Systems   Constitutional: Negative. Negative for fatigue and fever. Respiratory: Negative. Negative for cough, shortness of breath and wheezing. Cardiovascular: Negative. Negative for chest pain, palpitations and leg swelling. Gastrointestinal:  Negative for blood in stool. Psychiatric/Behavioral: Negative. Negative for behavioral problems. The patient is not nervous/anxious.       Past Medical History:   Diagnosis Date    Asthma     exercise induced     Past Surgical History:   Procedure Laterality Date    BACK SURGERY          BACK SURGERY  2021    BREAST ENHANCEMENT SURGERY      COSMETIC SURGERY      breast implants    FINGER SURGERY      age 3    LAMINECTOMY Right 2022    RIGHT L4-5 MICRODISKECTOMY performed by Kenneth Martin MD at [address]       Social History     Socioeconomic History    Marital status: Single     Spouse name: Not on file    Number of children: Not on file    Years of education: Not on file    Highest education level: Not on file   Occupational History    Not on file   Tobacco Use Smoking status: Every Day     Packs/day: 0.50     Years: 19.00     Pack years: 9.50     Types: Cigarettes    Smokeless tobacco: Never    Tobacco comments:     started age 13   Vaping Use    Vaping Use: Never used   Substance and Sexual Activity    Alcohol use: Yes     Alcohol/week: 0.0 standard drinks     Comment: ocassionally    Drug use: No    Sexual activity: Never   Other Topics Concern    Not on file   Social History Narrative    Not on file     Social Determinants of Health     Financial Resource Strain: Low Risk     Difficulty of Paying Living Expenses: Not hard at all   Food Insecurity: No Food Insecurity    Worried About Running Out of Food in the Last Year: Never true    [address] in the Last Year: Never true   Transportation Needs: No Transportation Needs    Lack of Transportation (Medical): No    Lack of Transportation (Non-Medical): No   Physical Activity: Not on file   Stress: Not on file   Social Connections: Not on file   Intimate Partner Violence: Not on file   Housing Stability: Unknown    Unable to Pay for Housing in the Last Year: Not on file    Number of Places Lived in the Last Year: Not on file    Unstable Housing in the Last Year: No     Family History   Problem Relation Age of Onset    Hypertension Mother     No Known Problems Father     Breast Cancer Neg Hx     Cancer Neg Hx     Colon Cancer Neg Hx     Diabetes Neg Hx     Eclampsia Neg Hx     Ovarian Cancer Neg Hx      Labor Neg Hx     Spont Abortions Neg Hx     Stroke Neg Hx       Allergies   Allergen Reactions    Amoxicillin Hives    Anesthetics, Halogenated      Hard to come out of it, hyperventilating, convulsion, nonresponsive    Oxycodone Nausea Only    Tramadol     Oxycodone-Acetaminophen Hives and Nausea And Vomiting    Topiramate Rash     Prior to Admission medications    Medication Sig Start Date End Date Taking?  Authorizing Provider   pantoprazole (PROTONIX) 40 MG tablet Take 1 tablet by mouth every morning (before breakfast) 2/16/23 3/18/23 Yes Alysha Ly APRN - CNP   gabapentin (NEURONTIN) 300 MG capsule Take 1 capsule by mouth 3 times daily for 30 days. 11/7/22 12/7/22  Kassandrageri Decker APRN - CNP   etonogestrel (NEXPLANON) 68 MG implant 68 mg by Subdermal route once for 1 dose 10/18/22 11/21/22  Elena Teague DO                I have reviewed the patient's medical history in detail and updated the computerized patient record. OBJECTIVE    Vitals:    02/16/23 1341   BP: 118/62   Pulse: 94   Temp: 97.8 °F (36.6 °C)   TempSrc: Temporal   SpO2: 98%   Weight: 165 lb 3.2 oz (74.9 kg)       Physical Exam  Constitutional:       Appearance: Normal appearance. HENT:      Head: Normocephalic. Cardiovascular:      Rate and Rhythm: Normal rate and regular rhythm. Heart sounds: No murmur heard. Pulmonary:      Effort: Pulmonary effort is normal. No respiratory distress. Breath sounds: Normal breath sounds. No wheezing. Skin:     General: Skin is warm and dry. Neurological:      General: No focal deficit present. Mental Status: She is alert and oriented to person, place, and time. ASSESSMENT/ PLAN    1. Gastroesophageal reflux disease, unspecified whether esophagitis present  Refilled for patient  - pantoprazole (PROTONIX) 40 MG tablet; Take 1 tablet by mouth every morning (before breakfast)  Dispense: 30 tablet; Refill: 3    2. Urinary frequency  Culture ordered, UA unremarkable. Labs ordered. - Culture, Urine  - Hemoglobin A1C; Future  - Comprehensive Metabolic Panel; Future  - CBC with Auto Differential; Future    3. Lumbar herniated disc  Educated patient to go back to her surgeon to make sure no issues are occurring now that urinary changes are occurring.   - Rodolfo Pettit MD, Neurosurgery, [place]    4. Fatigue, unspecified type  Labs ordered  - TSH with Reflex;  Future              On this date 2/16/2023 I have spent 30 minutes reviewing previous notes, test results and face to face with the patient discussing the diagnosis and importance of compliance with the treatment plan as well as documenting on the day of the visit. No follow-ups on file.      Electronically signed by:  PARKER Sherman CNP   2/16/23

## 2023-02-17 LAB — URINE CULTURE, ROUTINE: NORMAL

## 2023-02-17 NOTE — PROGRESS NOTES
Patient Name: David Art : 1985        Date: 2023      Type of Appt: Follow up    Reason for appt: follow up per PCP for loss of bladder control, no new studies    Pt last seen by Dr. Roxi Roberson on 22    Surgeries: 2022 - RIGHT L 4-5 MICRODISKECTOMY BY DR. BEAULIEU      2006 Left sided L4-5 fran-laminectomy and microdiskectomy by Dr. Demian Becerra     2001 L4 laminectomy, L4-5 diskectomy by Dr. Ruchi Porter    Physical therapy: 22 physical therapy for lumbar @ [facility]     Smoking: Yes    REVIEW OF SYSTEMS:    Headaches, Bruising/Bleeding Easily , Constipation, Sleep Disturbance, Diarrhea, Neck Pain, Back Pain                       [facility]) Physicians  Neurosurgery and Pain Management [address] [place] [address] 82: [phone]  F: [phone]          Patient:  David Art  YOB: 1985  Date: 2023    The patient is a 41 y.o. female who presents today for evaluation of the following problems:     Chief Complaint   Patient presents with    Back Pain        Referred by Anuj Ly APRN*      PAST MEDICAL, FAMILY AND SOCIAL HISTORY:  Past Medical History:   Diagnosis Date    Asthma     exercise induced     Past Surgical History:   Procedure Laterality Date    BACK SURGERY          BACK SURGERY  2021    BREAST ENHANCEMENT SURGERY      COSMETIC SURGERY      breast implants    FINGER SURGERY      age 3    LAMINECTOMY Right 2022    RIGHT L4-5 MICRODISKECTOMY performed by Madisyn Bowers MD at [address]       Family History   Problem Relation Age of Onset    Hypertension Mother     No Known Problems Father     Breast Cancer Neg Hx     Cancer Neg Hx     Colon Cancer Neg Hx     Diabetes Neg Hx     Eclampsia Neg Hx     Ovarian Cancer Neg Hx      Labor Neg Hx     Spont Abortions Neg Hx     Stroke Neg Hx      Current Outpatient Medications on File Prior to Visit   Medication Sig Dispense Refill    sodium-potassium-mag sulfate (SUPREP) 17.5-3.13-1.6 GM/177ML SOLN solution As directed 354 mL 0    pantoprazole (PROTONIX) 40 MG tablet Take 1 tablet by mouth every morning (before breakfast) 30 tablet 3    gabapentin (NEURONTIN) 300 MG capsule Take 1 capsule by mouth 3 times daily for 30 days. 90 capsule 0    etonogestrel (NEXPLANON) 68 MG implant 68 mg by Subdermal route once for 1 dose 1 each 0     Current Facility-Administered Medications on File Prior to Visit   Medication Dose Route Frequency Provider Last Rate Last Admin    etonogestrel (NEXPLANON) implant 68 mg  68 mg Subdermal Once Sravan Chow DO   68 mg at 10/31/22 1546     Social History     Tobacco Use    Smoking status: Every Day     Packs/day: 0.50     Years: 19.00     Pack years: 9.50     Types: Cigarettes    Smokeless tobacco: Never    Tobacco comments:     started age 13   Vaping Use    Vaping Use: Never used   Substance Use Topics    Alcohol use: Yes     Alcohol/week: 0.0 standard drinks     Comment: ocassionally    Drug use: No       ALLERGIES  Allergies   Allergen Reactions    Amoxicillin Hives    Anesthetics, Halogenated      Hard to come out of it, hyperventilating, convulsion, nonresponsive    Oxycodone Nausea Only    Tramadol     Oxycodone-Acetaminophen Hives and Nausea And Vomiting    Topiramate Rash         REVIEW OF SYSTEMS:  Review of Systems   Constitutional:  Negative for fever. HENT:  Negative for hearing loss. Eyes:  Negative for pain. Respiratory:  Negative for shortness of breath. Gastrointestinal:  Positive for constipation and diarrhea. Negative for nausea. Endocrine: Negative for heat intolerance. Genitourinary:  Negative for difficulty urinating. Musculoskeletal:  Positive for back pain and neck pain. Skin:  Negative for rash. Allergic/Immunologic: Negative for immunocompromised state. Neurological:  Positive for headaches. Psychiatric/Behavioral:  Positive for sleep disturbance. I have personally reviewed the PMH, PSH, family history, home medications, social history, allergies, ROS. Physical Exam:      Vitals:    02/21/23 1333   Temp: 97.6 °F (36.4 °C)   TempSrc: Temporal   Weight: 165 lb (74.8 kg)   Height: 5' 3\" (1.6 m)     Body mass index is 29.23 kg/m². Physical Exam  Vitals and nursing note reviewed. Constitutional:       Appearance: Normal appearance. HENT:      Head: Normocephalic and atraumatic. Right Ear: External ear normal.      Left Ear: External ear normal.      Nose: Nose normal.      Mouth/Throat:      Pharynx: Oropharynx is clear. Eyes:      Extraocular Movements: Extraocular movements intact. Cardiovascular:      Pulses: Normal pulses. Pulmonary:      Effort: Pulmonary effort is normal.   Abdominal:      Palpations: Abdomen is soft. Genitourinary:     Rectum: Normal.   Musculoskeletal:         General: Normal range of motion. Cervical back: Normal range of motion. Skin:     General: Skin is warm. Neurological:      General: No focal deficit present. Psychiatric:         Mood and Affect: Mood normal.        I have reviewed all laboratory studies, reports, data, and pertinent images.     EXAMINATION:   MRI OF THE LUMBAR SPINE WITHOUT AND WITH CONTRAST  10/24/2022 12:58 pm       TECHNIQUE:   Multiplanar multisequence MRI of the lumbar spine was performed without and   with the administration of intravenous contrast.       COMPARISON:   02/07/2021       HISTORY:   ORDERING SYSTEM PROVIDED HISTORY: severe low back pain, R sided leg numbness   and weakness, s/p L4-L5 microdiscetomy and L4 partial laminectomy   TECHNOLOGIST PROVIDED HISTORY:   Reason for exam:->severe low back pain, R sided leg numbness and weakness,   s/p L4-L5 microdiscetomy and L4 partial laminectomy   What is the sedation requirement?->None   Decision Support Exception - unselect if not a suspected or confirmed   emergency medical condition->Emergency Medical Condition (MA)   What reading provider will be dictating this exam?->CRC       FINDINGS:   BONES/ALIGNMENT: There is grade 1 retrolisthesis of L4 relative to L5   measuring 2 mm. Alignment is otherwise normal.  There is disc desiccation,   mild disc space narrowing and moderate degenerative endplate changes at P8-0. Intervertebral disc height and signal is otherwise normal.  There is no   spondylolysis. SPINAL CORD:  The conus medullaris is normal in size and signal intensities   and terminates normally. SOFT TISSUES: There is no paraspinal mass identified. There is no abnormal   enhancement identified. L1-L2: There is no disc bulge or protrusion present. There is no significant   spinal canal stenosis or neural foraminal narrowing present. L2-L3: There is no disc bulge or protrusion present. There is no significant   spinal canal stenosis or neural foraminal narrowing present. L3-L4: There is no disc bulge or protrusion present. There is no significant   spinal canal stenosis or neural foraminal narrowing present. L4-L5: The sequelae of bilateral laminectomies are noted. There is a disc   bulge, facet arthropathy and thickening of the ligamentum flavum. There is a   right paracentral disc extrusion demonstrating inferior migration along the   posterior margin of the L5 vertebral body. The disc extrusion measures 6 x 6   x 15 mm in AP by transverse by craniocaudal dimension, severely effacing the   right lateral recess. There is a left paracentral disc extrusion   demonstrating inferior migration. This measures 4 x 4 x 11 mm in AP by   transverse by craniocaudal dimension. This results in effacement of the left   lateral recess. There is overall moderate spinal canal stenosis and moderate   bilateral neural foraminal narrowing. L5-S1: There is no disc bulge or protrusion present. There is no significant   spinal canal stenosis or neural foraminal narrowing present. Impression   Sequelae of bilateral laminectomies at L4-5. There is a right paracentral   disc extrusion measuring 6 x 6 x 15 mm in AP by transverse by craniocaudal   dimension and a left paracentral disc extrusion measuring 4 x 4 x 11 mm in AP   by transverse by craniocaudal dimension. Both of these disc extrusions   demonstrate inferior migration, effacing the lateral recesses and   contributing to moderate spinal canal stenosis and moderate bilateral neural   foraminal narrowing, as described above. HISTORY OF PRESENT ILLNESS:  January 2023 developed urinary urgency. She has lost her urine because she cannot get to the bathroom quickly enough. 1 time at night when she was asleep. If she can get to the bathroom she has control of her urine. She is much improved from her last operation she has chronic numbness in her hands and her legs. However she is up active and can do most of her activities. She does not have any numbness sensory disturbances in the saddle area. Above images given to the patient. She has no pressure on the cauda equina. Small recurrent disc protrusion on the right side and a smaller 1 on the left but still adequate room for the cauda equina with no central canal compression. Patient is not a candidate for neuro spine surgery.     Plan consult urology        Amelia Avendaño MD

## 2023-02-21 ENCOUNTER — OFFICE VISIT (OUTPATIENT)
Dept: GASTROENTEROLOGY | Age: 38
End: 2023-02-21
Payer: COMMERCIAL

## 2023-02-21 ENCOUNTER — PREP FOR PROCEDURE (OUTPATIENT)
Dept: GASTROENTEROLOGY | Age: 38
End: 2023-02-21

## 2023-02-21 ENCOUNTER — OFFICE VISIT (OUTPATIENT)
Dept: NEUROSURGERY | Age: 38
End: 2023-02-21
Payer: COMMERCIAL

## 2023-02-21 VITALS — BODY MASS INDEX: 29.23 KG/M2 | WEIGHT: 165 LBS | HEIGHT: 63 IN | TEMPERATURE: 97.6 F

## 2023-02-21 VITALS — BODY MASS INDEX: 30.55 KG/M2 | HEART RATE: 62 BPM | WEIGHT: 166 LBS | HEIGHT: 62 IN | OXYGEN SATURATION: 100 %

## 2023-02-21 DIAGNOSIS — R19.4 CHANGE IN BOWEL HABITS: ICD-10-CM

## 2023-02-21 DIAGNOSIS — Z98.890 HISTORY OF LUMBAR LAMINECTOMY: Primary | ICD-10-CM

## 2023-02-21 DIAGNOSIS — R39.15 URINARY URGENCY: ICD-10-CM

## 2023-02-21 DIAGNOSIS — Z86.010 HISTORY OF COLON POLYPS: Primary | ICD-10-CM

## 2023-02-21 DIAGNOSIS — K21.9 GASTROESOPHAGEAL REFLUX DISEASE, UNSPECIFIED WHETHER ESOPHAGITIS PRESENT: ICD-10-CM

## 2023-02-21 DIAGNOSIS — R13.19 ESOPHAGEAL DYSPHAGIA: ICD-10-CM

## 2023-02-21 PROBLEM — Z86.0100 HISTORY OF COLON POLYPS: Status: ACTIVE | Noted: 2023-02-21

## 2023-02-21 PROCEDURE — G8417 CALC BMI ABV UP PARAM F/U: HCPCS | Performed by: NURSE PRACTITIONER

## 2023-02-21 PROCEDURE — 99242 OFF/OP CONSLTJ NEW/EST SF 20: CPT | Performed by: NEUROLOGICAL SURGERY

## 2023-02-21 PROCEDURE — G8427 DOCREV CUR MEDS BY ELIG CLIN: HCPCS | Performed by: NURSE PRACTITIONER

## 2023-02-21 PROCEDURE — 4004F PT TOBACCO SCREEN RCVD TLK: CPT | Performed by: NURSE PRACTITIONER

## 2023-02-21 PROCEDURE — G8482 FLU IMMUNIZE ORDER/ADMIN: HCPCS | Performed by: NEUROLOGICAL SURGERY

## 2023-02-21 PROCEDURE — G8482 FLU IMMUNIZE ORDER/ADMIN: HCPCS | Performed by: NURSE PRACTITIONER

## 2023-02-21 PROCEDURE — G8427 DOCREV CUR MEDS BY ELIG CLIN: HCPCS | Performed by: NEUROLOGICAL SURGERY

## 2023-02-21 PROCEDURE — 99214 OFFICE O/P EST MOD 30 MIN: CPT | Performed by: NURSE PRACTITIONER

## 2023-02-21 PROCEDURE — G8417 CALC BMI ABV UP PARAM F/U: HCPCS | Performed by: NEUROLOGICAL SURGERY

## 2023-02-21 RX ORDER — SODIUM, POTASSIUM,MAG SULFATES 17.5-3.13G
SOLUTION, RECONSTITUTED, ORAL ORAL
Qty: 354 ML | Refills: 0 | Status: SHIPPED | OUTPATIENT
Start: 2023-02-21

## 2023-02-21 ASSESSMENT — ENCOUNTER SYMPTOMS
CONSTIPATION: 1
BACK PAIN: 1
SHORTNESS OF BREATH: 0
DIARRHEA: 1
NAUSEA: 0
EYE PAIN: 0
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
## 2023-03-16 ENCOUNTER — TELEPHONE (OUTPATIENT)
Dept: NEUROSURGERY | Age: 38
End: 2023-03-16

## 2023-03-16 DIAGNOSIS — Z98.890 HISTORY OF LUMBAR LAMINECTOMY: Primary | ICD-10-CM

## 2023-03-16 NOTE — TELEPHONE ENCOUNTER
Patient's last day of physical therapy is 3/17/23 but patient would like to continue. Pt asks Dr. Joya Nolan to place another order of physical therapy? To be [facility]ed to [facility] [phone].     Patient received RIGHT L4-5 MICRODISKECTOMY on 11/11/22

## 2023-04-13 ENCOUNTER — ANESTHESIA EVENT (OUTPATIENT)
Dept: ENDOSCOPY | Age: 38
End: 2023-04-13
Payer: COMMERCIAL

## 2023-04-17 ENCOUNTER — TELEPHONE (OUTPATIENT)
Dept: GASTROENTEROLOGY | Age: 38
End: 2023-04-17

## 2023-04-17 NOTE — TELEPHONE ENCOUNTER
The patient is scheduled for a colon/egd on 4/20/23, the Suprep is to expensive. Please advise which prep you would like her to have. She would like the new prep emailed to her.  Thank You

## 2023-04-20 ENCOUNTER — HOSPITAL ENCOUNTER (OUTPATIENT)
Age: 38
Setting detail: OUTPATIENT SURGERY
Discharge: HOME | End: 2023-04-20
Attending: INTERNAL MEDICINE | Admitting: INTERNAL MEDICINE
Payer: COMMERCIAL

## 2023-04-20 ENCOUNTER — ANESTHESIA (OUTPATIENT)
Dept: ENDOSCOPY | Age: 38
End: 2023-04-20
Payer: COMMERCIAL

## 2023-04-20 VITALS
HEIGHT: 63 IN | RESPIRATION RATE: 18 BRPM | WEIGHT: 160 LBS | BODY MASS INDEX: 28.35 KG/M2 | DIASTOLIC BLOOD PRESSURE: 69 MMHG | OXYGEN SATURATION: 100 % | TEMPERATURE: 98.5 F | HEART RATE: 83 BPM | SYSTOLIC BLOOD PRESSURE: 112 MMHG

## 2023-04-20 DIAGNOSIS — R19.4 CHANGE IN BOWEL HABITS: ICD-10-CM

## 2023-04-20 DIAGNOSIS — K21.9 GASTROESOPHAGEAL REFLUX DISEASE: ICD-10-CM

## 2023-04-20 DIAGNOSIS — Z86.010 HISTORY OF COLON POLYPS: ICD-10-CM

## 2023-04-20 PROCEDURE — C1769 GUIDE WIRE: HCPCS | Performed by: INTERNAL MEDICINE

## 2023-04-20 PROCEDURE — 6370000000 HC RX 637 (ALT 250 FOR IP): Performed by: INTERNAL MEDICINE

## 2023-04-20 PROCEDURE — 2709999900 HC NON-CHARGEABLE SUPPLY: Performed by: INTERNAL MEDICINE

## 2023-04-20 PROCEDURE — 7100000010 HC PHASE II RECOVERY - FIRST 15 MIN: Performed by: INTERNAL MEDICINE

## 2023-04-20 PROCEDURE — 3609027000 HC COLONOSCOPY: Performed by: INTERNAL MEDICINE

## 2023-04-20 PROCEDURE — 2580000003 HC RX 258: Performed by: INTERNAL MEDICINE

## 2023-04-20 PROCEDURE — 88305 TISSUE EXAM BY PATHOLOGIST: CPT

## 2023-04-20 PROCEDURE — 3700000000 HC ANESTHESIA ATTENDED CARE: Performed by: INTERNAL MEDICINE

## 2023-04-20 PROCEDURE — 7100000011 HC PHASE II RECOVERY - ADDTL 15 MIN: Performed by: INTERNAL MEDICINE

## 2023-04-20 PROCEDURE — 3609017100 HC EGD: Performed by: INTERNAL MEDICINE

## 2023-04-20 PROCEDURE — 3700000001 HC ADD 15 MINUTES (ANESTHESIA): Performed by: INTERNAL MEDICINE

## 2023-04-20 PROCEDURE — 6360000002 HC RX W HCPCS: Performed by: NURSE ANESTHETIST, CERTIFIED REGISTERED

## 2023-04-20 RX ORDER — SODIUM CHLORIDE 9 MG/ML
INJECTION, SOLUTION INTRAVENOUS PRN
Status: DISCONTINUED | OUTPATIENT
Start: 2023-04-20 | End: 2023-04-20 | Stop reason: HOSPADM

## 2023-04-20 RX ORDER — SODIUM CHLORIDE 9 MG/ML
INJECTION, SOLUTION INTRAVENOUS CONTINUOUS
Status: CANCELLED | OUTPATIENT
Start: 2023-04-20

## 2023-04-20 RX ORDER — SODIUM CHLORIDE 9 MG/ML
INJECTION, SOLUTION INTRAVENOUS CONTINUOUS
Status: DISCONTINUED | OUTPATIENT
Start: 2023-04-20 | End: 2023-04-20 | Stop reason: HOSPADM

## 2023-04-20 RX ORDER — ONDANSETRON 2 MG/ML
4 INJECTION INTRAMUSCULAR; INTRAVENOUS
Status: DISCONTINUED | OUTPATIENT
Start: 2023-04-20 | End: 2023-04-20 | Stop reason: HOSPADM

## 2023-04-20 RX ORDER — ONDANSETRON 2 MG/ML
INJECTION INTRAMUSCULAR; INTRAVENOUS PRN
Status: DISCONTINUED | OUTPATIENT
Start: 2023-04-20 | End: 2023-04-20 | Stop reason: SDUPTHER

## 2023-04-20 RX ORDER — SODIUM CHLORIDE 0.9 % (FLUSH) 0.9 %
5-40 SYRINGE (ML) INJECTION PRN
Status: CANCELLED | OUTPATIENT
Start: 2023-04-20

## 2023-04-20 RX ORDER — SODIUM CHLORIDE 0.9 % (FLUSH) 0.9 %
5-40 SYRINGE (ML) INJECTION EVERY 12 HOURS SCHEDULED
Status: DISCONTINUED | OUTPATIENT
Start: 2023-04-20 | End: 2023-04-20 | Stop reason: HOSPADM

## 2023-04-20 RX ORDER — PROPOFOL 10 MG/ML
INJECTION, EMULSION INTRAVENOUS PRN
Status: DISCONTINUED | OUTPATIENT
Start: 2023-04-20 | End: 2023-04-20 | Stop reason: SDUPTHER

## 2023-04-20 RX ORDER — SODIUM CHLORIDE 9 MG/ML
25 INJECTION, SOLUTION INTRAVENOUS PRN
Status: DISCONTINUED | OUTPATIENT
Start: 2023-04-20 | End: 2023-04-20 | Stop reason: HOSPADM

## 2023-04-20 RX ORDER — SODIUM CHLORIDE 0.9 % (FLUSH) 0.9 %
5-40 SYRINGE (ML) INJECTION PRN
Status: DISCONTINUED | OUTPATIENT
Start: 2023-04-20 | End: 2023-04-20 | Stop reason: HOSPADM

## 2023-04-20 RX ORDER — MAGNESIUM HYDROXIDE 1200 MG/15ML
LIQUID ORAL PRN
Status: DISCONTINUED | OUTPATIENT
Start: 2023-04-20 | End: 2023-04-20 | Stop reason: ALTCHOICE

## 2023-04-20 RX ORDER — SODIUM CHLORIDE 9 MG/ML
25 INJECTION, SOLUTION INTRAVENOUS PRN
Status: CANCELLED | OUTPATIENT
Start: 2023-04-20

## 2023-04-20 RX ORDER — SIMETHICONE 20 MG/.3ML
EMULSION ORAL PRN
Status: DISCONTINUED | OUTPATIENT
Start: 2023-04-20 | End: 2023-04-20 | Stop reason: ALTCHOICE

## 2023-04-20 RX ORDER — SODIUM CHLORIDE 0.9 % (FLUSH) 0.9 %
5-40 SYRINGE (ML) INJECTION EVERY 12 HOURS SCHEDULED
Status: CANCELLED | OUTPATIENT
Start: 2023-04-20

## 2023-04-20 RX ADMIN — PROPOFOL 200 MG: 10 INJECTION, EMULSION INTRAVENOUS at 11:01

## 2023-04-20 RX ADMIN — ONDANSETRON 4 MG: 2 INJECTION INTRAMUSCULAR; INTRAVENOUS at 11:01

## 2023-04-20 RX ADMIN — PROPOFOL 200 MG: 10 INJECTION, EMULSION INTRAVENOUS at 10:57

## 2023-04-20 RX ADMIN — PROPOFOL 100 MG: 10 INJECTION, EMULSION INTRAVENOUS at 11:22

## 2023-04-20 RX ADMIN — SODIUM CHLORIDE: 9 INJECTION, SOLUTION INTRAVENOUS at 09:35

## 2023-04-20 RX ADMIN — PROPOFOL 100 MG: 10 INJECTION, EMULSION INTRAVENOUS at 11:15

## 2023-04-20 RX ADMIN — PROPOFOL 100 MG: 10 INJECTION, EMULSION INTRAVENOUS at 11:13

## 2023-04-20 RX ADMIN — PROPOFOL 200 MG: 10 INJECTION, EMULSION INTRAVENOUS at 11:04

## 2023-04-20 ASSESSMENT — PAIN - FUNCTIONAL ASSESSMENT: PAIN_FUNCTIONAL_ASSESSMENT: 0-10

## 2023-04-20 ASSESSMENT — PAIN SCALES - GENERAL
PAINLEVEL_OUTOF10: 0
PAINLEVEL_OUTOF10: 0

## 2023-04-20 ASSESSMENT — LIFESTYLE VARIABLES: SMOKING_STATUS: 1

## 2023-04-20 NOTE — ANESTHESIA POSTPROCEDURE EVALUATION
Department of Anesthesiology  Postprocedure Note    Patient: Carlos Millard  MRN: [id number]  [place]: 1985  Date of evaluation: 4/20/2023      Procedure Summary     Date: 04/20/23 Room / Location: [address] [place], [place] [address]    Anesthesia Start: 1052 Anesthesia Stop:     Procedures:       EGD DIAGNOSTIC ONLY      COLONOSCOPY DIAGNOSTIC Diagnosis:       History of colon polyps      Change in bowel habits      Gastroesophageal reflux disease      (History of colon polyps [Z86.010])      (Change in bowel habits [R19.4])      (Gastroesophageal reflux disease [K21.9])    Surgeons: Nilesh Muse MD Responsible Provider: Selmer Hammans, APRN - CRNA    Anesthesia Type: MAC ASA Status: 3          Anesthesia Type: No value filed.     Aniya Phase I: Aniya Score: 10    Aniya Phase II:        Anesthesia Post Evaluation    Patient location during evaluation: bedside  Patient participation: complete - patient participated  Level of consciousness: awake and awake and alert  Airway patency: patent  Nausea & Vomiting: no nausea and no vomiting  Complications: no  Cardiovascular status: blood pressure returned to baseline and hemodynamically stable  Respiratory status: acceptable  Hydration status: euvolemic

## 2023-04-20 NOTE — H&P
Patient Name: Anay Chiu  : 1985  MRN: [id number]  DATE: 23      ENDOSCOPY  History and Physical    Procedure:    [] Diagnostic Colonoscopy       [x] Screening Colonoscopy  [x] EGD      [] ERCP      [] EUS       [] Other    [x] Previous office notes/History and Physical reviewed from the patients chart. Please see EMR for further details of HPI. I have examined the patient's status immediately prior to the procedure and:      Indications/HPI:    []Abdominal Pain   []Cancer- GI/Lung  []Fhx of colon CA  []History of Polyps   []Prietos   []Melena  []Abnormal Imaging   [x]Dysphagia    []Persistent Pneumonia  []Anemia   []Food Impaction  [x]History of Polyps  []GI Bleed   []Pulmonary nodule/Mass  []Change in bowel habits  [x]Heartburn/Reflux  []Rectal Bleed (BRBPR)  []Chest Pain - Non Cardiac  []Heme (+) Stool  []Ulcers  []Constipation   []Hemoptysis   []Varices  []Diarrhea   []Hypoxemia  []Nausea/Vomiting   []Screening   []Crohns/Colitis  []Other:    Anesthesia:   [x] MAC [] Moderate Sedation   [] General   [] None     ROS: 12 pt Review of Symptoms was negative unless mentioned above    Medications:   Prior to Admission medications    Medication Sig Start Date End Date Taking? Authorizing Provider   sodium-potassium-mag sulfate (SUPREP) 17.5-3.13-1.6 GM/177ML SOLN solution As directed  Patient not taking: Reported on 2023   PARKER Odonnell CNP   pantoprazole (PROTONIX) 40 MG tablet Take 1 tablet by mouth every morning (before breakfast) 23  PARKER Rivas CNP   gabapentin (NEURONTIN) 300 MG capsule Take 1 capsule by mouth 3 times daily for 30 days. Patient not taking: Reported on 2023  PARKER Jennings CNP   etonogestrel (NEXPLANON) 68 MG implant 68 mg by Subdermal route once for 1 dose 10/18/22 11/21/22  Marny Saint, DO     Allergies:    Allergies   Allergen Reactions    Amoxicillin Hives    Anesthetics, Halogenated      Hard to

## 2023-04-20 NOTE — ANESTHESIA PRE PROCEDURE
02:11 PM    ALKPHOS 78 02/16/2023 02:11 PM    AST 21 02/16/2023 02:11 PM    ALT 17 02/16/2023 02:11 PM       POC Tests: No results for input(s): POCGLU, POCNA, POCK, POCCL, POCBUN, POCHEMO, POCHCT in the last 72 hours. Coags:   Lab Results   Component Value Date/Time    PROTIME 13.8 11/07/2022 03:57 PM    INR 1.1 11/07/2022 03:57 PM    APTT 32.9 11/07/2022 03:57 PM       HCG (If Applicable):   Lab Results   Component Value Date    PREGTESTUR NEGATIVE 10/31/2022        ABGs: No results found for: PHART, PO2ART, OYB0MUP, DUT5ZBC, BEART, E1AIFWYS     Type & Screen (If Applicable):  No results found for: LABABO, LABRH    Drug/Infectious Status (If Applicable):  No results found for: HIV, HEPCAB    COVID-19 Screening (If Applicable): No results found for: COVID19        Anesthesia Evaluation  Patient summary reviewed and Nursing notes reviewed   history of anesthetic complications: postop delirium. Airway: Mallampati: II          Dental: normal exam         Pulmonary:normal exam    (+) COPD:  asthma: current smoker          Patient did not smoke on day of surgery. Cardiovascular:                      Neuro/Psych:   (+) neuromuscular disease:, headaches: migraine headaches, psychiatric history:            GI/Hepatic/Renal:   (+) GERD:,           Endo/Other:                     Abdominal:             Vascular: Other Findings:           Anesthesia Plan      MAC     ASA 3       Induction: intravenous. Anesthetic plan and risks discussed with patient.                         PARKER Hanson - CRNA   4/20/2023

## 2023-04-21 ENCOUNTER — OFFICE VISIT (OUTPATIENT)
Dept: FAMILY MEDICINE CLINIC | Age: 38
End: 2023-04-21
Payer: COMMERCIAL

## 2023-04-21 VITALS
OXYGEN SATURATION: 98 % | SYSTOLIC BLOOD PRESSURE: 120 MMHG | WEIGHT: 163 LBS | HEIGHT: 63 IN | HEART RATE: 90 BPM | TEMPERATURE: 98.8 F | BODY MASS INDEX: 28.88 KG/M2 | DIASTOLIC BLOOD PRESSURE: 79 MMHG

## 2023-04-21 DIAGNOSIS — I89.0 LYMPHEDEMA: Primary | ICD-10-CM

## 2023-04-21 DIAGNOSIS — R35.0 URINARY FREQUENCY: ICD-10-CM

## 2023-04-21 DIAGNOSIS — K21.9 GASTROESOPHAGEAL REFLUX DISEASE, UNSPECIFIED WHETHER ESOPHAGITIS PRESENT: ICD-10-CM

## 2023-04-21 DIAGNOSIS — N92.0 MENORRHAGIA WITH REGULAR CYCLE: ICD-10-CM

## 2023-04-21 DIAGNOSIS — M54.16 LUMBAR RADICULOPATHY: ICD-10-CM

## 2023-04-21 PROCEDURE — 99214 OFFICE O/P EST MOD 30 MIN: CPT | Performed by: STUDENT IN AN ORGANIZED HEALTH CARE EDUCATION/TRAINING PROGRAM

## 2023-04-21 PROCEDURE — G8427 DOCREV CUR MEDS BY ELIG CLIN: HCPCS | Performed by: STUDENT IN AN ORGANIZED HEALTH CARE EDUCATION/TRAINING PROGRAM

## 2023-04-21 PROCEDURE — 4004F PT TOBACCO SCREEN RCVD TLK: CPT | Performed by: STUDENT IN AN ORGANIZED HEALTH CARE EDUCATION/TRAINING PROGRAM

## 2023-04-21 PROCEDURE — G8417 CALC BMI ABV UP PARAM F/U: HCPCS | Performed by: STUDENT IN AN ORGANIZED HEALTH CARE EDUCATION/TRAINING PROGRAM

## 2023-04-21 RX ORDER — RIZATRIPTAN BENZOATE 5 MG/1
TABLET ORAL
COMMUNITY
Start: 2023-03-27

## 2023-04-21 ASSESSMENT — ENCOUNTER SYMPTOMS
BACK PAIN: 1
RHINORRHEA: 0
SORE THROAT: 0
ABDOMINAL PAIN: 0
VOMITING: 0
WHEEZING: 0
DIARRHEA: 0
COUGH: 0
NAUSEA: 0
SHORTNESS OF BREATH: 0
EYE DISCHARGE: 0

## 2023-04-21 NOTE — PROGRESS NOTES
cycle  Significantly proved. Continue therapy with      5. Gastroesophageal reflux disease, unspecified whether esophagitis present  Stable, chronic. Continue pantoprazole 40 once daily. No refills needed. Continue monitor. Follow-up as scheduled    Orders Placed This Encounter   Procedures    Brittney Santos MD, Interventional Radiology, [facility]     Referral Priority:   Routine     Referral Type:   Eval and Treat     Referral Reason:   Specialty Services Required     Requested Specialty:   Radiology     Number of Visits Requested:   1     Orders Placed This Encounter   Medications    Handicap Placard MISC     Sig: by Does not apply route For medical condition lasting longer than 5 years. Start: 4/21/2023  End: 4/21/2028     Dispense:  1 each     Refill:  0     Medications Discontinued During This Encounter   Medication Reason    gabapentin (NEURONTIN) 300 MG capsule Therapy completed    sodium-potassium-mag sulfate (SUPREP) 17.5-3.13-1.6 GM/177ML SOLN solution Therapy completed     Return in about 6 months (around 10/21/2023) for follow up. Reviewed with the patient: current clinical status,medications, activities and diet. Side effects, adverse effects of the medication prescribed today, as well as treatment plan/ rationale and result expectations have been discussed with the patient who expresses understanding and desires to proceed. Close follow up to evaluate treatment results and for coordination of care. I have reviewed the patient's medical history in detail and updated the computerized patient record. Please note, this report has been partially produced using speech recognition software and may cause  and /or contain errors related to that system including grammar, punctuation and spelling as well as words and phrases that may seem inappropriate. If there are questions or concerns please feel free to contact me to clarify.     Catina Vidales,

## 2023-04-25 ENCOUNTER — OFFICE VISIT (OUTPATIENT)
Dept: OBGYN CLINIC | Age: 38
End: 2023-04-25

## 2023-04-25 VITALS
BODY MASS INDEX: 29.06 KG/M2 | WEIGHT: 164 LBS | DIASTOLIC BLOOD PRESSURE: 62 MMHG | HEART RATE: 80 BPM | HEIGHT: 63 IN | SYSTOLIC BLOOD PRESSURE: 98 MMHG

## 2023-04-25 DIAGNOSIS — Z53.21 PATIENT LEFT WITHOUT BEING SEEN: Primary | ICD-10-CM

## 2023-04-25 NOTE — PROGRESS NOTES
Patient was in office for appointment and was made aware at check in that Dr. Rosalinda Levi was in a  but he would be in the office shortly. At 9:40, went in to update patient that he would be here shortly and she said she understood babies are born but she was unable to wait. She had another appointment at 10:00 and had obligations at home she needed to attend to. Appointment was rescheduled and she left without being seen by provider.

## 2023-04-28 ENCOUNTER — OFFICE VISIT (OUTPATIENT)
Dept: OBGYN CLINIC | Age: 38
End: 2023-04-28
Payer: COMMERCIAL

## 2023-04-28 VITALS
HEART RATE: 84 BPM | DIASTOLIC BLOOD PRESSURE: 64 MMHG | WEIGHT: 164 LBS | HEIGHT: 63 IN | BODY MASS INDEX: 29.06 KG/M2 | SYSTOLIC BLOOD PRESSURE: 98 MMHG

## 2023-04-28 DIAGNOSIS — N39.41 URGENCY INCONTINENCE: Primary | ICD-10-CM

## 2023-04-28 DIAGNOSIS — N93.9 ABNORMAL UTERINE BLEEDING (AUB): ICD-10-CM

## 2023-04-28 DIAGNOSIS — N39.41 URGENCY INCONTINENCE: ICD-10-CM

## 2023-04-28 LAB
BASOPHILS # BLD: 0.1 K/UL (ref 0–0.2)
BASOPHILS NFR BLD: 0.9 %
BILIRUB UR QL STRIP: NEGATIVE
CLARITY UR: CLEAR
COLOR UR: YELLOW
EOSINOPHIL # BLD: 0 K/UL (ref 0–0.7)
EOSINOPHIL NFR BLD: 0.7 %
ERYTHROCYTE [DISTWIDTH] IN BLOOD BY AUTOMATED COUNT: 14.4 % (ref 11.5–14.5)
GLUCOSE UR STRIP-MCNC: NEGATIVE MG/DL
GONADOTROPIN, CHORIONIC (HCG) QUANT: <0.1 MIU/ML
HCT VFR BLD AUTO: 38.3 % (ref 37–47)
HGB BLD-MCNC: 12.9 G/DL (ref 12–16)
HGB UR QL STRIP: NEGATIVE
KETONES UR STRIP-MCNC: NEGATIVE MG/DL
LEUKOCYTE ESTERASE UR QL STRIP: NEGATIVE
LYMPHOCYTES # BLD: 1.9 K/UL (ref 1–4.8)
LYMPHOCYTES NFR BLD: 28.5 %
MCH RBC QN AUTO: 30.5 PG (ref 27–31.3)
MCHC RBC AUTO-ENTMCNC: 33.6 % (ref 33–37)
MCV RBC AUTO: 90.7 FL (ref 79.4–94.8)
MONOCYTES # BLD: 0.6 K/UL (ref 0.2–0.8)
MONOCYTES NFR BLD: 8.4 %
NEUTROPHILS # BLD: 4.1 K/UL (ref 1.4–6.5)
NEUTS SEG NFR BLD: 61.5 %
NITRITE UR QL STRIP: NEGATIVE
PH UR STRIP: 6 [PH] (ref 5–9)
PLATELET # BLD AUTO: 336 K/UL (ref 130–400)
PROT UR STRIP-MCNC: NEGATIVE MG/DL
RBC # BLD AUTO: 4.22 M/UL (ref 4.2–5.4)
SP GR UR STRIP: 1.02 (ref 1–1.03)
T4 FREE SERPL-MCNC: 1.15 NG/DL (ref 0.84–1.68)
UROBILINOGEN UR STRIP-ACNC: 0.2 E.U./DL
WBC # BLD AUTO: 6.7 K/UL (ref 4.8–10.8)

## 2023-04-28 PROCEDURE — G8427 DOCREV CUR MEDS BY ELIG CLIN: HCPCS | Performed by: OBSTETRICS & GYNECOLOGY

## 2023-04-28 PROCEDURE — 4004F PT TOBACCO SCREEN RCVD TLK: CPT | Performed by: OBSTETRICS & GYNECOLOGY

## 2023-04-28 PROCEDURE — G8417 CALC BMI ABV UP PARAM F/U: HCPCS | Performed by: OBSTETRICS & GYNECOLOGY

## 2023-04-28 PROCEDURE — 99203 OFFICE O/P NEW LOW 30 MIN: CPT | Performed by: OBSTETRICS & GYNECOLOGY

## 2023-04-28 RX ORDER — OXYBUTYNIN CHLORIDE 10 MG/1
10 TABLET, EXTENDED RELEASE ORAL DAILY
Qty: 15 TABLET | Refills: 0 | Status: SHIPPED | OUTPATIENT
Start: 2023-04-28

## 2023-04-28 NOTE — PROGRESS NOTES
Shanda Art is a 41 y.o. female who presents here today for complaints of Bladder Problem (Urinary urgency and frequency. Referred by Dr. Damaris Fernandez.)   Urgency, frequency, incontinence, nocturia 1-4 times. No treatment prior . History negative for UTI . Patient with history of heavy vaginal bleeding as well, currently on Nexaplnon which manages to decrease the intensity of bleeding but has worsened the irregular pattern since placement which is affecting patients daily activitiies. Vitals:  BP 98/64 (Site: Left Upper Arm, Position: Sitting, Cuff Size: Medium Adult)   Pulse 84   Ht 5' 3\" (1.6 m)   Wt 164 lb (74.4 kg)   LMP 2023 (Approximate)   BMI 29.05 kg/m²   Allergies:  Amoxicillin; Anesthetics, halogenated; Oxycodone; Tramadol; Oxycodone-acetaminophen; and Topiramate  Past Medical History:   Diagnosis Date    Asthma     exercise induced     Past Surgical History:   Procedure Laterality Date    BACK SURGERY          BACK SURGERY  2021    BREAST ENHANCEMENT SURGERY      COLONOSCOPY      prior to , dr Erick Hester? COLONOSCOPY N/A 2023    COLONOSCOPY DIAGNOSTIC performed by Evi Beach MD at . [facility] 71      breast implants    ENDOSCOPY, COLON, DIAGNOSTIC      prior to , dr Caldera Stajose?     FINGER SURGERY      age 3    LAMINECTOMY Right 2022    RIGHT L4-5 MICRODISKECTOMY performed by Nina Henderson MD at [address]      UPPER GASTROINTESTINAL ENDOSCOPY N/A 2023    EGD w/biopsy performed by Evi Beach MD at [facility]     OB History          2    Para   0    Term                AB   1    Living   0         SAB        IAB   0    Ectopic        Molar        Multiple        Live Births                  Family History   Problem Relation Age of Onset    Hypertension Mother     No Known Problems Father     Breast Cancer Neg Hx     Cancer Neg Hx     Colon Cancer Neg Hx     Diabetes Neg Hx     Eclampsia Neg

## 2023-04-30 LAB — BACTERIA UR CULT: NORMAL

## 2023-05-04 ENCOUNTER — OFFICE VISIT (OUTPATIENT)
Dept: GASTROENTEROLOGY | Age: 38
End: 2023-05-04
Payer: COMMERCIAL

## 2023-05-04 VITALS — WEIGHT: 169 LBS | BODY MASS INDEX: 29.95 KG/M2 | OXYGEN SATURATION: 99 % | HEIGHT: 63 IN | HEART RATE: 74 BPM

## 2023-05-04 DIAGNOSIS — R19.4 CHANGE IN BOWEL HABITS: ICD-10-CM

## 2023-05-04 DIAGNOSIS — R13.19 ESOPHAGEAL DYSPHAGIA: ICD-10-CM

## 2023-05-04 DIAGNOSIS — K21.9 GASTROESOPHAGEAL REFLUX DISEASE WITHOUT ESOPHAGITIS: Primary | ICD-10-CM

## 2023-05-04 PROCEDURE — G8427 DOCREV CUR MEDS BY ELIG CLIN: HCPCS | Performed by: NURSE PRACTITIONER

## 2023-05-04 PROCEDURE — 99213 OFFICE O/P EST LOW 20 MIN: CPT | Performed by: NURSE PRACTITIONER

## 2023-05-04 PROCEDURE — 4004F PT TOBACCO SCREEN RCVD TLK: CPT | Performed by: NURSE PRACTITIONER

## 2023-05-04 PROCEDURE — G8417 CALC BMI ABV UP PARAM F/U: HCPCS | Performed by: NURSE PRACTITIONER

## 2023-05-04 RX ORDER — FAMOTIDINE 20 MG/1
20 TABLET, FILM COATED ORAL 2 TIMES DAILY PRN
Qty: 60 TABLET | Refills: 3 | Status: SHIPPED | OUTPATIENT
Start: 2023-05-04

## 2023-05-04 RX ORDER — PANTOPRAZOLE SODIUM 40 MG/1
40 GRANULE, DELAYED RELEASE ORAL
COMMUNITY

## 2023-05-04 NOTE — PROGRESS NOTES
Gastroenterology Clinic Follow up Visit    Angel Rodriguez  [id number]  Chief Complaint   Patient presents with    Follow-up    Gastroesophageal Reflux    Constipation     HPI: 41 y.o. female following up after last GI clinic on 2/21/2023. S/p EGD/colonoscopy on 4/20/2023. Interval change: Patient reports doing well. States she had intermittent loose stool after her procedure that has since resolved. States she feels as though having the colonoscopy and bowel prep has normalized her bowel habits. States she is currently having a bowel movement twice daily. She continues to report breakthrough GERD symptoms despite taking Protonix daily. She does report eating chocolate chip cookie dough late at night with exacerbation of her GERD symptoms. She reports 100% improvement in her dysphagia symptoms, no further issues in regards to dysphagia. She denies nausea/vomiting, hematemesis, dysphagia, abdominal pain, hematochezia, melena or weight loss. HPI from last GI clinic visit on 2/21/2023  summarized below:  Patient continues to have reflux symptoms 1-2 times per week despite taking pantoprazole daily. States she is trying to follow a healthier diet. However, will sneak to the refrigerator and eat cookie dough at times. She additionally reports increased GERD symptoms with red sauces. She additionally reports feeling as though food gets stuck, especially bread/mashed potatoes, especially when she is eating quickly. She continues to have a bowel movement every 3-4 days. She reports prior to her spine surgery she used to have a bowel movement 1-2 times per day. States she is not taking any pain medications currently. Trial of MiraLAX daily resulted in diarrheal symptoms lasting for approximately 1 week after discontinuation.  She does report having an upper and lower endoscopy in the past. States that she tried to obtain records of her prior procedures, however when she called [facility]

## 2023-05-09 ENCOUNTER — HOSPITAL ENCOUNTER (OUTPATIENT)
Dept: ULTRASOUND IMAGING | Age: 38
Discharge: HOME | End: 2023-05-11
Payer: COMMERCIAL

## 2023-05-09 DIAGNOSIS — N39.41 URGENCY INCONTINENCE: ICD-10-CM

## 2023-05-09 PROCEDURE — 93975 VASCULAR STUDY: CPT

## 2023-05-09 PROCEDURE — 76856 US EXAM PELVIC COMPLETE: CPT

## 2023-05-09 PROCEDURE — 76830 TRANSVAGINAL US NON-OB: CPT

## 2023-05-12 ENCOUNTER — OFFICE VISIT (OUTPATIENT)
Dept: OBGYN CLINIC | Age: 38
End: 2023-05-12
Payer: COMMERCIAL

## 2023-05-12 VITALS
BODY MASS INDEX: 29.23 KG/M2 | SYSTOLIC BLOOD PRESSURE: 106 MMHG | WEIGHT: 165 LBS | DIASTOLIC BLOOD PRESSURE: 64 MMHG | HEIGHT: 63 IN | HEART RATE: 84 BPM

## 2023-05-12 DIAGNOSIS — R10.2 PELVIC PAIN IN FEMALE: ICD-10-CM

## 2023-05-12 DIAGNOSIS — G89.18 POSTOPERATIVE PAIN: Primary | ICD-10-CM

## 2023-05-12 DIAGNOSIS — N93.9 ABNORMAL UTERINE BLEEDING (AUB): ICD-10-CM

## 2023-05-12 DIAGNOSIS — N83.202 LEFT OVARIAN CYST: ICD-10-CM

## 2023-05-12 DIAGNOSIS — N39.41 URGENCY INCONTINENCE: ICD-10-CM

## 2023-05-12 PROCEDURE — 4004F PT TOBACCO SCREEN RCVD TLK: CPT | Performed by: OBSTETRICS & GYNECOLOGY

## 2023-05-12 PROCEDURE — G8417 CALC BMI ABV UP PARAM F/U: HCPCS | Performed by: OBSTETRICS & GYNECOLOGY

## 2023-05-12 PROCEDURE — 99213 OFFICE O/P EST LOW 20 MIN: CPT | Performed by: OBSTETRICS & GYNECOLOGY

## 2023-05-12 PROCEDURE — G8427 DOCREV CUR MEDS BY ELIG CLIN: HCPCS | Performed by: OBSTETRICS & GYNECOLOGY

## 2023-05-12 RX ORDER — HYDROCODONE BITARTRATE AND ACETAMINOPHEN 5; 325 MG/1; MG/1
2 TABLET ORAL ONCE
Qty: 2 TABLET | Refills: 0 | Status: SHIPPED | OUTPATIENT
Start: 2023-05-12 | End: 2023-05-12

## 2023-05-12 RX ORDER — ONDANSETRON 4 MG/1
8 TABLET, FILM COATED ORAL EVERY 6 HOURS PRN
Qty: 10 TABLET | Refills: 1 | Status: SHIPPED | OUTPATIENT
Start: 2023-05-12

## 2023-05-12 RX ORDER — LORAZEPAM 1 MG/1
TABLET ORAL
Qty: 1 TABLET | Refills: 0 | Status: SHIPPED | OUTPATIENT
Start: 2023-05-12 | End: 2023-05-12

## 2023-05-12 RX ORDER — IBUPROFEN 800 MG/1
TABLET ORAL
Qty: 40 TABLET | Refills: 0 | Status: SHIPPED | OUTPATIENT
Start: 2023-05-12

## 2023-05-12 NOTE — PROGRESS NOTES
Insecurity: No Food Insecurity    Worried About Running Out of Food in the Last Year: Never true    Ran Out of Food in the Last Year: Never true   Transportation Needs: No Transportation Needs    Lack of Transportation (Medical): No    Lack of Transportation (Non-Medical): No   Physical Activity: Not on file   Stress: Not on file   Social Connections: Not on file   Intimate Partner Violence: Not on file   Housing Stability: Unknown    Unable to Pay for Housing in the Last Year: Not on file    Number of Places Lived in the Last Year: Not on file    Unstable Housing in the Last Year: No       Contraceptive method:  tubal ligation    Patient's medications, allergies, past medical, surgical, social and family histories were reviewed and updated as appropriate. Review of Systems  As per chief complaint   All other systems reviewed and are negative. Heavy vaginal bleeding, pelvic pain, urgency, frequency, incontinence, nocturia    Physical Exam:  Vitals:  /64 (Site: Left Upper Arm, Position: Sitting, Cuff Size: Medium Adult)   Pulse 84   Ht 5' 3\" (1.6 m)   Wt 165 lb (74.8 kg)   LMP 04/14/2023 (Approximate)   BMI 29.23 kg/m²   Lungs: CTAB   Heart : Regular S1/S2, no M/R/G  Abdomen: Soft , NT, ND , + BS   Pelvic exam : Deferred    Assessment:      Diagnosis Orders   1. Postoperative pain  LORazepam (ATIVAN) 1 MG tablet    HYDROcodone-acetaminophen (NORCO) 5-325 MG per tablet      2. Urgency incontinence        3. Abnormal uterine bleeding (AUB)        4. Pelvic pain in female        5. Left ovarian cyst            Plan:     Anticholinergic oxybutynin stopped due to side effects. Trial of mirabegron 50 mg p.o. daily for the next 2 weeks started samples provided from the office. Patient to be scheduled for Endo C and biopsy. Ultrasound findings discussed with the patient showing mildly enlarged uterus and left ovarian cyst measuring approximately 3 x 4 cm.     We will discuss definitive treatment for the

## 2023-05-15 ENCOUNTER — TELEPHONE (OUTPATIENT)
Dept: FAMILY MEDICINE CLINIC | Age: 38
End: 2023-05-15

## 2023-05-15 NOTE — TELEPHONE ENCOUNTER
Patient called asking to get a message to pcp and ask her to call her. She said she has a question about her disability and does not want it to get \"lost in translation\". Thank you.
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
## 2023-05-18 NOTE — TELEPHONE ENCOUNTER
Ever Lancaster calling into the office stating that she would like a call from Dr. Samir Christensen MA regarding this paperwork.

## 2023-05-31 ENCOUNTER — PROCEDURE VISIT (OUTPATIENT)
Dept: OBGYN CLINIC | Age: 38
End: 2023-05-31

## 2023-05-31 VITALS
DIASTOLIC BLOOD PRESSURE: 74 MMHG | BODY MASS INDEX: 29.59 KG/M2 | HEIGHT: 63 IN | HEART RATE: 92 BPM | SYSTOLIC BLOOD PRESSURE: 104 MMHG | WEIGHT: 167 LBS

## 2023-05-31 DIAGNOSIS — N93.9 ABNORMAL UTERINE BLEEDING (AUB): ICD-10-CM

## 2023-05-31 DIAGNOSIS — N39.41 URGENCY INCONTINENCE: Primary | ICD-10-CM

## 2023-05-31 RX ORDER — SOLIFENACIN SUCCINATE 10 MG/1
10 TABLET, FILM COATED ORAL DAILY
Qty: 14 TABLET | Refills: 0 | Status: SHIPPED | OUTPATIENT
Start: 2023-05-31

## 2023-05-31 RX ORDER — SOLIFENACIN SUCCINATE 10 MG/1
10 TABLET, FILM COATED ORAL DAILY
Qty: 30 TABLET | Refills: 3 | Status: SHIPPED | OUTPATIENT
Start: 2023-05-31 | End: 2023-05-31

## 2023-06-02 NOTE — PROGRESS NOTES
Endosee Office hysteroscopy procedure    Preoperative diagnosis: Abnormal uterine bleeding, pelvic pain  Postoperative diagnosis: Abnormal uterine bleeding, pelvic pain  Procedure:  EndoSee office hysteroscopy and Endometrial Biopsy    Procedure Details   After informed consent speculum placed and cervix was prepped with betadine. Ericka clamp placed on the anterior lip of the cervix. Cervix was dilated to 3 mm using os finder. Then the Interceed catheter with the lens introduced the cervix and inducing monitor was then assembled to the catheter successfully and with the aid of normal saline injected by a 60 mL syringe slowly by my assistant that uterine cavity was examined . Neomia Shelling Operative findings : Normal endometrial cavity, no evidence of any polyps or focal thickening noted, both tubal ostia visualized, pictures taken as well. EndoSee removed and EMB done with Evi. Ericka removed, good hemostasis noted, speculum removed. Patient tolerated well. Will follow-up in 2 weeks. Operative findings : See above, endometrial biopsy obtained. Condition : Patient stable at the end of the procedure    Дмитрий Hester M.D., F.SAMANTHA.C.O. G
the temporary wire removed. If your  symptoms have improved by at least 50 percent, you will be given the option to proceed with  InterStim placement. This procedure will be scheduled at the next available date convenient for you. InterStim Placement:  A permanent wire (lead) will be inserted and attached to a small neurostimulator battery that will be  implanted under the skin in the upper buttock. This will be done in the operating room as an  outpatient procedure with a local anesthetic and I.V. sedation. After the InterStim is implanted, you  will be given a patient  to adjust the stimulation from outside of the body. InterStim  batteries last from three to six years. You may need to be seen in the office occasionally to have your  InterStim reprogrammed based on changes in your urinary symptoms. Orders Placed This Encounter   Procedures    Pap Smear     Patient History:    No LMP recorded. OBGYN Status: Having periods  Past Surgical History:  No date: BACK SURGERY      Comment:  2006 08/05/2021: BACK SURGERY  No date: BREAST ENHANCEMENT SURGERY  No date: COLONOSCOPY      Comment:  prior to 2015, dr Herman Downey? 4/20/2023: COLONOSCOPY; N/A      Comment:  COLONOSCOPY DIAGNOSTIC performed by Vicky Hurtado MD at                [facility]  No date: COSMETIC SURGERY      Comment:  breast implants  No date: ENDOSCOPY, COLON, DIAGNOSTIC      Comment:  prior to 2015, dr Hayley Weiss?   No date: FINGER SURGERY      Comment:  age 3  11/11/2022: LAMINECTOMY; Right      Comment:  RIGHT L4-5 MICRODISKECTOMY performed by Tracey Hutchins MD                at [facility]  No date: LIPOSUCTION  4/20/2023: UPPER GASTROINTESTINAL ENDOSCOPY; N/A      Comment:  EGD w/biopsy performed by Vicky Hurtado MD at [facility]  Medications/Contraceptives Affecting Cytology     Progestin Contraceptives - Implants Disp Start End     etonogestrel (NEXPLANON) implant 68 mg     10/31/2022      68 mg, Subdermal,

## 2023-06-06 LAB
HPV HR 12 DNA SPEC QL NAA+PROBE: NOT DETECTED
HPV16 DNA SPEC QL NAA+PROBE: NOT DETECTED
HPV16+18+H RISK 12 DNA SPEC-IMP: NORMAL
HPV18 DNA SPEC QL NAA+PROBE: NOT DETECTED

## 2023-06-19 ENCOUNTER — OFFICE VISIT (OUTPATIENT)
Dept: OBGYN CLINIC | Age: 38
End: 2023-06-19

## 2023-06-19 VITALS
BODY MASS INDEX: 29.95 KG/M2 | HEIGHT: 63 IN | WEIGHT: 169 LBS | DIASTOLIC BLOOD PRESSURE: 64 MMHG | HEART RATE: 100 BPM | SYSTOLIC BLOOD PRESSURE: 104 MMHG

## 2023-06-19 DIAGNOSIS — R10.2 PELVIC PAIN IN FEMALE: ICD-10-CM

## 2023-06-19 DIAGNOSIS — N92.6 IRREGULAR PERIODS/MENSTRUAL CYCLES: ICD-10-CM

## 2023-06-19 DIAGNOSIS — N39.41 URGENCY INCONTINENCE: Primary | ICD-10-CM

## 2023-06-19 DIAGNOSIS — N93.9 ABNORMAL UTERINE BLEEDING (AUB): ICD-10-CM

## 2023-06-19 DIAGNOSIS — N83.202 LEFT OVARIAN CYST: ICD-10-CM

## 2023-06-19 RX ORDER — POLYETHYLENE GLYCOL 3350 17 G/17G
POWDER, FOR SOLUTION ORAL
Qty: 238 G | Refills: 0 | Status: SHIPPED | OUTPATIENT
Start: 2023-06-19

## 2023-06-19 RX ORDER — CLINDAMYCIN PHOSPHATE 20 MG/G
CREAM VAGINAL
Qty: 1 EACH | Refills: 0 | Status: SHIPPED | OUTPATIENT
Start: 2023-06-19

## 2023-06-20 ENCOUNTER — PREP FOR PROCEDURE (OUTPATIENT)
Dept: OBGYN CLINIC | Age: 38
End: 2023-06-20

## 2023-06-20 PROBLEM — R10.2 FEMALE PELVIC PAIN: Status: ACTIVE | Noted: 2023-06-20

## 2023-06-20 RX ORDER — SODIUM CHLORIDE 0.9 % (FLUSH) 0.9 %
5-40 SYRINGE (ML) INJECTION EVERY 12 HOURS SCHEDULED
OUTPATIENT
Start: 2023-06-20

## 2023-06-20 RX ORDER — SODIUM CHLORIDE 9 MG/ML
INJECTION, SOLUTION INTRAVENOUS PRN
OUTPATIENT
Start: 2023-06-20

## 2023-06-20 RX ORDER — SODIUM CHLORIDE 0.9 % (FLUSH) 0.9 %
5-40 SYRINGE (ML) INJECTION PRN
OUTPATIENT
Start: 2023-06-20

## 2023-06-22 DIAGNOSIS — K21.9 GASTROESOPHAGEAL REFLUX DISEASE, UNSPECIFIED WHETHER ESOPHAGITIS PRESENT: ICD-10-CM

## 2023-06-23 RX ORDER — PANTOPRAZOLE SODIUM 40 MG/1
40 TABLET, DELAYED RELEASE ORAL
Qty: 90 TABLET | Refills: 1 | Status: SHIPPED | OUTPATIENT
Start: 2023-06-23

## 2023-06-25 RX ORDER — SODIUM CHLORIDE 9 MG/ML
INJECTION, SOLUTION INTRAVENOUS PRN
OUTPATIENT
Start: 2023-06-25

## 2023-06-25 RX ORDER — SODIUM CHLORIDE 0.9 % (FLUSH) 0.9 %
5-40 SYRINGE (ML) INJECTION PRN
OUTPATIENT
Start: 2023-06-25

## 2023-06-25 RX ORDER — SODIUM CHLORIDE 0.9 % (FLUSH) 0.9 %
5-40 SYRINGE (ML) INJECTION EVERY 12 HOURS SCHEDULED
OUTPATIENT
Start: 2023-06-25

## 2023-06-28 DIAGNOSIS — R61 EXCESSIVE SWEATING: ICD-10-CM

## 2023-07-07 ENCOUNTER — HOSPITAL ENCOUNTER (OUTPATIENT)
Dept: PREADMISSION TESTING | Age: 38
Discharge: HOME | End: 2023-07-11

## 2023-07-07 VITALS
HEIGHT: 63 IN | WEIGHT: 172.2 LBS | HEART RATE: 82 BPM | DIASTOLIC BLOOD PRESSURE: 68 MMHG | OXYGEN SATURATION: 99 % | TEMPERATURE: 98.7 F | BODY MASS INDEX: 30.51 KG/M2 | RESPIRATION RATE: 16 BRPM | SYSTOLIC BLOOD PRESSURE: 118 MMHG

## 2023-07-07 RX ORDER — UBROGEPANT 100 MG/1
100 TABLET ORAL PRN
COMMUNITY
Start: 2023-06-21

## 2023-07-07 RX ORDER — FREMANEZUMAB-VFRM 225 MG/1.5ML
225 INJECTION SUBCUTANEOUS
COMMUNITY
Start: 2023-06-15

## 2023-07-07 RX ORDER — HYDROCODONE BITARTRATE AND ACETAMINOPHEN 5; 325 MG/1; MG/1
1 TABLET ORAL PRN
Status: ON HOLD | COMMUNITY
Start: 2023-05-12 | End: 2023-07-13 | Stop reason: ALTCHOICE

## 2023-07-12 ENCOUNTER — ANESTHESIA EVENT (OUTPATIENT)
Dept: OPERATING ROOM | Age: 38
End: 2023-07-12
Payer: COMMERCIAL

## 2023-07-13 ENCOUNTER — HOSPITAL ENCOUNTER (OUTPATIENT)
Dept: GENERAL RADIOLOGY | Age: 38
Setting detail: OUTPATIENT SURGERY
Discharge: HOME | End: 2023-07-15
Attending: OBSTETRICS & GYNECOLOGY
Payer: COMMERCIAL

## 2023-07-13 ENCOUNTER — ANESTHESIA (OUTPATIENT)
Dept: OPERATING ROOM | Age: 38
End: 2023-07-13
Payer: COMMERCIAL

## 2023-07-13 ENCOUNTER — HOSPITAL ENCOUNTER (OUTPATIENT)
Age: 38
Setting detail: OUTPATIENT SURGERY
Discharge: HOME | End: 2023-07-13
Attending: OBSTETRICS & GYNECOLOGY | Admitting: OBSTETRICS & GYNECOLOGY
Payer: COMMERCIAL

## 2023-07-13 VITALS
RESPIRATION RATE: 18 BRPM | TEMPERATURE: 97 F | DIASTOLIC BLOOD PRESSURE: 54 MMHG | HEART RATE: 80 BPM | SYSTOLIC BLOOD PRESSURE: 94 MMHG | OXYGEN SATURATION: 96 %

## 2023-07-13 DIAGNOSIS — G89.18 POSTOPERATIVE PAIN: Primary | ICD-10-CM

## 2023-07-13 DIAGNOSIS — R52 PAIN: ICD-10-CM

## 2023-07-13 DIAGNOSIS — N39.41 URGE INCONTINENCE: ICD-10-CM

## 2023-07-13 LAB
HCG, URINE, POC: NEGATIVE
Lab: NORMAL
NEGATIVE QC PASS/FAIL: NORMAL
POSITIVE QC PASS/FAIL: NORMAL

## 2023-07-13 PROCEDURE — 64590 INS/RPL PRPH SAC/GSTR NPG/R: CPT | Performed by: OBSTETRICS & GYNECOLOGY

## 2023-07-13 PROCEDURE — 7100000000 HC PACU RECOVERY - FIRST 15 MIN: Performed by: OBSTETRICS & GYNECOLOGY

## 2023-07-13 PROCEDURE — 2580000003 HC RX 258: Performed by: OBSTETRICS & GYNECOLOGY

## 2023-07-13 PROCEDURE — C1787 PATIENT PROGR, NEUROSTIM: HCPCS | Performed by: OBSTETRICS & GYNECOLOGY

## 2023-07-13 PROCEDURE — A4217 STERILE WATER/SALINE, 500 ML: HCPCS | Performed by: OBSTETRICS & GYNECOLOGY

## 2023-07-13 PROCEDURE — 64561 IMPLANT NEUROELECTRODES: CPT | Performed by: OBSTETRICS & GYNECOLOGY

## 2023-07-13 PROCEDURE — C1781 MESH (IMPLANTABLE): HCPCS | Performed by: OBSTETRICS & GYNECOLOGY

## 2023-07-13 PROCEDURE — 7100000011 HC PHASE II RECOVERY - ADDTL 15 MIN: Performed by: OBSTETRICS & GYNECOLOGY

## 2023-07-13 PROCEDURE — 6360000002 HC RX W HCPCS: Performed by: OBSTETRICS & GYNECOLOGY

## 2023-07-13 PROCEDURE — 76000 FLUOROSCOPY <1 HR PHYS/QHP: CPT | Performed by: OBSTETRICS & GYNECOLOGY

## 2023-07-13 PROCEDURE — 3600000014 HC SURGERY LEVEL 4 ADDTL 15MIN: Performed by: OBSTETRICS & GYNECOLOGY

## 2023-07-13 PROCEDURE — 6370000000 HC RX 637 (ALT 250 FOR IP): Performed by: STUDENT IN AN ORGANIZED HEALTH CARE EDUCATION/TRAINING PROGRAM

## 2023-07-13 PROCEDURE — 2500000003 HC RX 250 WO HCPCS

## 2023-07-13 PROCEDURE — C1897 LEAD, NEUROSTIM TEST KIT: HCPCS | Performed by: OBSTETRICS & GYNECOLOGY

## 2023-07-13 PROCEDURE — 7100000001 HC PACU RECOVERY - ADDTL 15 MIN: Performed by: OBSTETRICS & GYNECOLOGY

## 2023-07-13 PROCEDURE — 2709999900 HC NON-CHARGEABLE SUPPLY: Performed by: OBSTETRICS & GYNECOLOGY

## 2023-07-13 PROCEDURE — 7100000010 HC PHASE II RECOVERY - FIRST 15 MIN: Performed by: OBSTETRICS & GYNECOLOGY

## 2023-07-13 PROCEDURE — 95972 ALYS CPLX SP/PN NPGT W/PRGRM: CPT | Performed by: OBSTETRICS & GYNECOLOGY

## 2023-07-13 PROCEDURE — C9399 UNCLASSIFIED DRUGS OR BIOLOG: HCPCS

## 2023-07-13 PROCEDURE — C1767 GENERATOR, NEURO NON-RECHARG: HCPCS | Performed by: OBSTETRICS & GYNECOLOGY

## 2023-07-13 PROCEDURE — 2500000003 HC RX 250 WO HCPCS: Performed by: OBSTETRICS & GYNECOLOGY

## 2023-07-13 PROCEDURE — 3600000004 HC SURGERY LEVEL 4 BASE: Performed by: OBSTETRICS & GYNECOLOGY

## 2023-07-13 PROCEDURE — C1778 LEAD, NEUROSTIMULATOR: HCPCS | Performed by: OBSTETRICS & GYNECOLOGY

## 2023-07-13 PROCEDURE — 6360000002 HC RX W HCPCS: Performed by: STUDENT IN AN ORGANIZED HEALTH CARE EDUCATION/TRAINING PROGRAM

## 2023-07-13 PROCEDURE — 2580000003 HC RX 258

## 2023-07-13 PROCEDURE — 6360000002 HC RX W HCPCS

## 2023-07-13 PROCEDURE — 3700000001 HC ADD 15 MINUTES (ANESTHESIA): Performed by: OBSTETRICS & GYNECOLOGY

## 2023-07-13 PROCEDURE — 3700000000 HC ANESTHESIA ATTENDED CARE: Performed by: OBSTETRICS & GYNECOLOGY

## 2023-07-13 DEVICE — LEAD NERVE STIM 4.32 MM INTERSTIM SURESCAN: Type: IMPLANTABLE DEVICE | Site: SACRUM | Status: FUNCTIONAL

## 2023-07-13 DEVICE — NEUROSTIMULATOR INTERSTIM X RECHRGE FREE TORQ WRNCH PROD: Type: IMPLANTABLE DEVICE | Site: BUTTOCKS | Status: FUNCTIONAL

## 2023-07-13 DEVICE — POUCH TYRX NEURO ANTIMICROBIAL MED: Type: IMPLANTABLE DEVICE | Site: BUTTOCKS | Status: FUNCTIONAL

## 2023-07-13 DEVICE — NEUROSTIMULATOR EXT SM LTWT SGL BTTN H2O RESIST WIRELESS: Type: IMPLANTABLE DEVICE | Site: OTHER | Status: FUNCTIONAL

## 2023-07-13 RX ORDER — SODIUM CHLORIDE 0.9 % (FLUSH) 0.9 %
5-40 SYRINGE (ML) INJECTION PRN
Status: DISCONTINUED | OUTPATIENT
Start: 2023-07-13 | End: 2023-07-13 | Stop reason: HOSPADM

## 2023-07-13 RX ORDER — DIPHENHYDRAMINE HYDROCHLORIDE 50 MG/ML
12.5 INJECTION INTRAMUSCULAR; INTRAVENOUS
Status: DISCONTINUED | OUTPATIENT
Start: 2023-07-13 | End: 2023-07-13 | Stop reason: HOSPADM

## 2023-07-13 RX ORDER — SODIUM CHLORIDE 9 MG/ML
INJECTION, SOLUTION INTRAVENOUS PRN
Status: DISCONTINUED | OUTPATIENT
Start: 2023-07-13 | End: 2023-07-13 | Stop reason: HOSPADM

## 2023-07-13 RX ORDER — HYDROMORPHONE HYDROCHLORIDE 1 MG/ML
0.5 INJECTION, SOLUTION INTRAMUSCULAR; INTRAVENOUS; SUBCUTANEOUS EVERY 10 MIN PRN
Status: DISCONTINUED | OUTPATIENT
Start: 2023-07-13 | End: 2023-07-13 | Stop reason: HOSPADM

## 2023-07-13 RX ORDER — SODIUM CHLORIDE 0.9 % (FLUSH) 0.9 %
5-40 SYRINGE (ML) INJECTION EVERY 12 HOURS SCHEDULED
Status: DISCONTINUED | OUTPATIENT
Start: 2023-07-13 | End: 2023-07-13 | Stop reason: HOSPADM

## 2023-07-13 RX ORDER — KETOROLAC TROMETHAMINE 30 MG/ML
30 INJECTION, SOLUTION INTRAMUSCULAR; INTRAVENOUS EVERY 6 HOURS
Status: DISCONTINUED | OUTPATIENT
Start: 2023-07-13 | End: 2023-07-13 | Stop reason: HOSPADM

## 2023-07-13 RX ORDER — HYDROMORPHONE HYDROCHLORIDE 1 MG/ML
1 INJECTION, SOLUTION INTRAMUSCULAR; INTRAVENOUS; SUBCUTANEOUS
Status: DISCONTINUED | OUTPATIENT
Start: 2023-07-13 | End: 2023-07-13 | Stop reason: HOSPADM

## 2023-07-13 RX ORDER — METOCLOPRAMIDE HYDROCHLORIDE 5 MG/ML
10 INJECTION INTRAMUSCULAR; INTRAVENOUS
Status: DISCONTINUED | OUTPATIENT
Start: 2023-07-13 | End: 2023-07-13 | Stop reason: HOSPADM

## 2023-07-13 RX ORDER — DEXAMETHASONE SODIUM PHOSPHATE 4 MG/ML
INJECTION, SOLUTION INTRA-ARTICULAR; INTRALESIONAL; INTRAMUSCULAR; INTRAVENOUS; SOFT TISSUE PRN
Status: DISCONTINUED | OUTPATIENT
Start: 2023-07-13 | End: 2023-07-13 | Stop reason: SDUPTHER

## 2023-07-13 RX ORDER — PROPOFOL 10 MG/ML
INJECTION, EMULSION INTRAVENOUS PRN
Status: DISCONTINUED | OUTPATIENT
Start: 2023-07-13 | End: 2023-07-13 | Stop reason: SDUPTHER

## 2023-07-13 RX ORDER — FENTANYL CITRATE 50 UG/ML
INJECTION, SOLUTION INTRAMUSCULAR; INTRAVENOUS PRN
Status: DISCONTINUED | OUTPATIENT
Start: 2023-07-13 | End: 2023-07-13 | Stop reason: SDUPTHER

## 2023-07-13 RX ORDER — ONDANSETRON 4 MG/1
4 TABLET, FILM COATED ORAL EVERY 6 HOURS PRN
Qty: 30 TABLET | Refills: 1 | Status: SHIPPED | OUTPATIENT
Start: 2023-07-13

## 2023-07-13 RX ORDER — IBUPROFEN 800 MG/1
800 TABLET ORAL EVERY 8 HOURS PRN
Qty: 60 TABLET | Refills: 0 | Status: SHIPPED | OUTPATIENT
Start: 2023-07-13

## 2023-07-13 RX ORDER — ROCURONIUM BROMIDE 10 MG/ML
INJECTION, SOLUTION INTRAVENOUS PRN
Status: DISCONTINUED | OUTPATIENT
Start: 2023-07-13 | End: 2023-07-13 | Stop reason: SDUPTHER

## 2023-07-13 RX ORDER — HYDROCODONE BITARTRATE AND ACETAMINOPHEN 5; 325 MG/1; MG/1
2 TABLET ORAL NIGHTLY PRN
Qty: 10 TABLET | Refills: 0 | Status: SHIPPED | OUTPATIENT
Start: 2023-07-13 | End: 2023-07-18

## 2023-07-13 RX ORDER — LIDOCAINE HYDROCHLORIDE 10 MG/ML
1 INJECTION, SOLUTION EPIDURAL; INFILTRATION; INTRACAUDAL; PERINEURAL
Status: DISCONTINUED | OUTPATIENT
Start: 2023-07-13 | End: 2023-07-13 | Stop reason: HOSPADM

## 2023-07-13 RX ORDER — CLINDAMYCIN PHOSPHATE 900 MG/50ML
900 INJECTION INTRAVENOUS
Status: COMPLETED | OUTPATIENT
Start: 2023-07-13 | End: 2023-07-13

## 2023-07-13 RX ORDER — ONDANSETRON 2 MG/ML
INJECTION INTRAMUSCULAR; INTRAVENOUS PRN
Status: DISCONTINUED | OUTPATIENT
Start: 2023-07-13 | End: 2023-07-13 | Stop reason: SDUPTHER

## 2023-07-13 RX ORDER — ONDANSETRON 2 MG/ML
4 INJECTION INTRAMUSCULAR; INTRAVENOUS EVERY 6 HOURS PRN
Status: DISCONTINUED | OUTPATIENT
Start: 2023-07-13 | End: 2023-07-13 | Stop reason: HOSPADM

## 2023-07-13 RX ORDER — MAGNESIUM HYDROXIDE 1200 MG/15ML
LIQUID ORAL CONTINUOUS PRN
Status: DISCONTINUED | OUTPATIENT
Start: 2023-07-13 | End: 2023-07-13 | Stop reason: HOSPADM

## 2023-07-13 RX ORDER — ACETAMINOPHEN 500 MG
1000 TABLET ORAL EVERY 8 HOURS PRN
Status: DISCONTINUED | OUTPATIENT
Start: 2023-07-13 | End: 2023-07-13 | Stop reason: HOSPADM

## 2023-07-13 RX ORDER — FENTANYL CITRATE 0.05 MG/ML
50 INJECTION, SOLUTION INTRAMUSCULAR; INTRAVENOUS EVERY 10 MIN PRN
Status: DISCONTINUED | OUTPATIENT
Start: 2023-07-13 | End: 2023-07-13 | Stop reason: HOSPADM

## 2023-07-13 RX ORDER — HYDROCODONE BITARTRATE AND ACETAMINOPHEN 5; 325 MG/1; MG/1
1 TABLET ORAL EVERY 6 HOURS PRN
Status: COMPLETED | OUTPATIENT
Start: 2023-07-13 | End: 2023-07-13

## 2023-07-13 RX ORDER — MEPERIDINE HYDROCHLORIDE 25 MG/ML
12.5 INJECTION INTRAMUSCULAR; INTRAVENOUS; SUBCUTANEOUS
Status: DISCONTINUED | OUTPATIENT
Start: 2023-07-13 | End: 2023-07-13 | Stop reason: HOSPADM

## 2023-07-13 RX ORDER — SULFAMETHOXAZOLE AND TRIMETHOPRIM 800; 160 MG/1; MG/1
1 TABLET ORAL 2 TIMES DAILY
Qty: 20 TABLET | Refills: 0 | Status: SHIPPED | OUTPATIENT
Start: 2023-07-13 | End: 2023-07-23

## 2023-07-13 RX ORDER — ONDANSETRON 2 MG/ML
4 INJECTION INTRAMUSCULAR; INTRAVENOUS
Status: DISCONTINUED | OUTPATIENT
Start: 2023-07-13 | End: 2023-07-13 | Stop reason: HOSPADM

## 2023-07-13 RX ORDER — ONDANSETRON 4 MG/1
4 TABLET, ORALLY DISINTEGRATING ORAL EVERY 8 HOURS PRN
Status: DISCONTINUED | OUTPATIENT
Start: 2023-07-13 | End: 2023-07-13 | Stop reason: HOSPADM

## 2023-07-13 RX ORDER — MIDAZOLAM HYDROCHLORIDE 1 MG/ML
INJECTION INTRAMUSCULAR; INTRAVENOUS PRN
Status: DISCONTINUED | OUTPATIENT
Start: 2023-07-13 | End: 2023-07-13 | Stop reason: SDUPTHER

## 2023-07-13 RX ORDER — BUPIVACAINE HYDROCHLORIDE AND EPINEPHRINE 5; 5 MG/ML; UG/ML
INJECTION, SOLUTION EPIDURAL; INTRACAUDAL; PERINEURAL PRN
Status: DISCONTINUED | OUTPATIENT
Start: 2023-07-13 | End: 2023-07-13 | Stop reason: HOSPADM

## 2023-07-13 RX ORDER — ACETAMINOPHEN 500 MG
1000 TABLET ORAL EVERY 6 HOURS PRN
Qty: 60 TABLET | Refills: 0 | Status: SHIPPED | OUTPATIENT
Start: 2023-07-13

## 2023-07-13 RX ORDER — SODIUM CHLORIDE, SODIUM LACTATE, POTASSIUM CHLORIDE, CALCIUM CHLORIDE 600; 310; 30; 20 MG/100ML; MG/100ML; MG/100ML; MG/100ML
INJECTION, SOLUTION INTRAVENOUS CONTINUOUS PRN
Status: DISCONTINUED | OUTPATIENT
Start: 2023-07-13 | End: 2023-07-13 | Stop reason: SDUPTHER

## 2023-07-13 RX ORDER — OXYCODONE HYDROCHLORIDE 5 MG/1
10 TABLET ORAL EVERY 4 HOURS PRN
Status: DISCONTINUED | OUTPATIENT
Start: 2023-07-13 | End: 2023-07-13

## 2023-07-13 RX ADMIN — MIDAZOLAM HYDROCHLORIDE 2 MG: 1 INJECTION, SOLUTION INTRAMUSCULAR; INTRAVENOUS at 14:29

## 2023-07-13 RX ADMIN — PROPOFOL 50 MG: 10 INJECTION, EMULSION INTRAVENOUS at 14:56

## 2023-07-13 RX ADMIN — FENTANYL CITRATE 50 MCG: 50 INJECTION, SOLUTION INTRAMUSCULAR; INTRAVENOUS at 14:33

## 2023-07-13 RX ADMIN — ONDANSETRON 4 MG: 2 INJECTION INTRAMUSCULAR; INTRAVENOUS at 14:41

## 2023-07-13 RX ADMIN — SODIUM CHLORIDE 1000 ML: 9 INJECTION, SOLUTION INTRAVENOUS at 13:08

## 2023-07-13 RX ADMIN — ROCURONIUM BROMIDE 50 MG: 10 INJECTION, SOLUTION INTRAVENOUS at 14:33

## 2023-07-13 RX ADMIN — GENTAMICIN SULFATE 383.6 MG: 40 INJECTION, SOLUTION INTRAMUSCULAR; INTRAVENOUS at 14:37

## 2023-07-13 RX ADMIN — HYDROCODONE BITARTRATE AND ACETAMINOPHEN 1 TABLET: 5; 325 TABLET ORAL at 16:34

## 2023-07-13 RX ADMIN — SODIUM CHLORIDE, POTASSIUM CHLORIDE, SODIUM LACTATE AND CALCIUM CHLORIDE: 600; 310; 30; 20 INJECTION, SOLUTION INTRAVENOUS at 14:29

## 2023-07-13 RX ADMIN — CLINDAMYCIN IN 5 PERCENT DEXTROSE 900 MG: 18 INJECTION, SOLUTION INTRAVENOUS at 14:41

## 2023-07-13 RX ADMIN — SUGAMMADEX 200 MG: 100 INJECTION, SOLUTION INTRAVENOUS at 14:53

## 2023-07-13 RX ADMIN — MIDAZOLAM HYDROCHLORIDE 2 MG: 1 INJECTION, SOLUTION INTRAMUSCULAR; INTRAVENOUS at 14:34

## 2023-07-13 RX ADMIN — PROPOFOL 200 MG: 10 INJECTION, EMULSION INTRAVENOUS at 14:33

## 2023-07-13 RX ADMIN — DEXAMETHASONE SODIUM PHOSPHATE 4 MG: 4 INJECTION, SOLUTION INTRAMUSCULAR; INTRAVENOUS at 14:41

## 2023-07-13 RX ADMIN — FENTANYL CITRATE 50 MCG: 0.05 INJECTION, SOLUTION INTRAMUSCULAR; INTRAVENOUS at 15:56

## 2023-07-13 RX ADMIN — PROPOFOL 200 MCG/KG/MIN: 10 INJECTION, EMULSION INTRAVENOUS at 14:36

## 2023-07-13 ASSESSMENT — PAIN DESCRIPTION - LOCATION
LOCATION: INCISION
LOCATION: BUTTOCKS

## 2023-07-13 ASSESSMENT — PAIN SCALES - GENERAL
PAINLEVEL_OUTOF10: 0
PAINLEVEL_OUTOF10: 5

## 2023-07-13 ASSESSMENT — PAIN - FUNCTIONAL ASSESSMENT
PAIN_FUNCTIONAL_ASSESSMENT: ACTIVITIES ARE NOT PREVENTED
PAIN_FUNCTIONAL_ASSESSMENT: 0-10

## 2023-07-13 ASSESSMENT — PAIN DESCRIPTION - DESCRIPTORS
DESCRIPTORS: ACHING;SORE
DESCRIPTORS: NUMBNESS;TINGLING

## 2023-07-13 ASSESSMENT — LIFESTYLE VARIABLES: SMOKING_STATUS: 1

## 2023-07-13 NOTE — PROGRESS NOTES
CLINICAL PHARMACY NOTE: MEDS TO BEDS    Total # of Prescriptions Filled: 5   The following medications were delivered to the patient:  Hydrocodone-Apap 5-325 mg Tab  Ibuprofen 800 mg Tab  Ondansetron 4 mg Tab  Sulfameth-Trim 800-160 mg Tab  Acetaminophen 500 mg Tab    Additional Documentation:

## 2023-07-13 NOTE — ANESTHESIA POSTPROCEDURE EVALUATION
Department of Anesthesiology  Postprocedure Note    Patient: Suraj Taylor  MRN: [id number]  YOB: 1985  Date of evaluation: 7/13/2023      Procedure Summary     Date: 07/13/23 Room / Location: [address] [place] [address]    Anesthesia Start: 0297 Anesthesia Stop: 7514    Procedure: STAGE 1 AND STAGE 2 INTERSTIM (Back) Diagnosis:       Urge incontinence      (Urge incontinence [N39.41])    Surgeons: John Best MD Responsible Provider: Luis Morfin DO    Anesthesia Type: general ASA Status: 2          Anesthesia Type: No value filed.     Aniya Phase I: Aniya Score: 10    Aniya Phase II:        Anesthesia Post Evaluation    Patient location during evaluation: PACU  Patient participation: complete - patient participated  Level of consciousness: awake  Airway patency: patent  Nausea & Vomiting: no nausea and no vomiting  Complications: no  Cardiovascular status: hemodynamically stable  Respiratory status: acceptable  Hydration status: euvolemic

## 2023-07-13 NOTE — OP NOTE
Interstim 1 and 2      Description: Stage I and II neuromodulator. PREOPERATIVE DIAGNOSIS: Refractory urgency and frequency. POSTOPERATIVE DIAGNOSIS: Refractory urgency and frequency. OPERATION: Stage I and II neuromodulator. ANESTHESIA: General anaesthesia. ESTIMATED BLOOD LOSS: Minimal.     FLUIDS: Crystalloid. The patient was given Ancef preop antibiotic. Gentamicin irrigation was used throughout the procedure. BRIEF HISTORY: The patient is a 59-year-old female who presented to us with urgency and frequency on physical exam. There was no evidence of cystocele or rectocele. The patient was tried on oxybutynin and mirabegron for over 1 month. The patient had pretty much failure from each of the procedure. The patient had less than 20% improvement with anticholinergics. Options such as continuously trying anticholinergics, continuation of the Kegel exercises, and trial of InterStim were discussed. The patient was interested in the trial. The patient had percutaneous InterStim trial in the office with over 70% to 80% improvement in her urgency, frequency, and urge incontinence. The patient was significantly satisfied with the results and wanted to proceed with stage I and II neuromodulator. Risks of anesthesia, bleeding, infection, pain, MI, DVT, and PE were discussed. Risk of failure of the procedure in the future was discussed. Risk of lead migration that the treatment may or may not work in the long-term basis and data on the long term were not clear were discussed with the patient. The patient understood and wanted to proceed with stage I and II neuromodulator. Consent was obtained. DETAILS OF THE OPERATION: The patient was brought to the OR. The patient was placed in prone position. A pillow was placed underneath her pelvis area to slightly lift the pelvis up. The patient under general anesthesia through the IV, .  The patient's back was prepped and draped in the usual sterile

## 2023-07-27 ENCOUNTER — TELEPHONE (OUTPATIENT)
Dept: OBGYN CLINIC | Age: 38
End: 2023-07-27

## 2023-07-27 NOTE — TELEPHONE ENCOUNTER
If the rash she is having an allergic reaction to a medication or a sign of infection at the incision site? It sounds like she needs to have Dr. Manjeet Orozco take a look at it.

## 2023-07-27 NOTE — TELEPHONE ENCOUNTER
Caitlyn Bunch called this afternoon stating she had a Stage 1&2 Intersim 7/13/23 and has had a rash about 2\" around the incision. She said within the last 24 hours it's started spreading to her arms and legs. She has not tried anything OTC because it was so close to her incision and she didn't want to mess anything up.

## 2023-07-31 ENCOUNTER — OFFICE VISIT (OUTPATIENT)
Dept: OBGYN CLINIC | Age: 38
End: 2023-07-31

## 2023-07-31 VITALS
BODY MASS INDEX: 30.3 KG/M2 | DIASTOLIC BLOOD PRESSURE: 64 MMHG | HEIGHT: 63 IN | HEART RATE: 92 BPM | WEIGHT: 171 LBS | SYSTOLIC BLOOD PRESSURE: 100 MMHG

## 2023-07-31 DIAGNOSIS — Z09 POSTOP CHECK: Primary | ICD-10-CM

## 2023-07-31 PROCEDURE — 99024 POSTOP FOLLOW-UP VISIT: CPT | Performed by: OBSTETRICS & GYNECOLOGY

## 2023-07-31 NOTE — PROGRESS NOTES
Postop Progress Note    Subjective    presents to the office for postop follow up. 2 wks post stage I and stage II InterStim, patient does mention that her urge incontinence symptoms has improved more than 90%, she mentions she still experiences some urinary urgency and frequency in the morning with very minimal leakage she does not wear pads anymore, patient does mention that she drinks caffeine supplement in the morning    Objective    Vitals:    05/26/23 0852   BP: 106/64   Pulse: 76     General: alert, cooperative and no distress  Incision: healing well  Vag exam: Deferred    Assessment  Doing well postoperatively. Plan    Patient doing well postop, I change her program to program 3 at intensity 0.9 from program 2 at intensity 0.9 to try and see if we can get better control of her morning symptoms, patient was shown how to change programs and intensity on the device using the kit that she has with her today. Patient is scheduled for total laparoscopic hysterectomy which she will undergo soon please see previous encounters regarding that evaluation. Segundo Butterfield M.D., F.A.C.O. G

## 2023-08-04 ENCOUNTER — OFFICE VISIT (OUTPATIENT)
Dept: INTERVENTIONAL RADIOLOGY/VASCULAR | Age: 38
End: 2023-08-04
Payer: COMMERCIAL

## 2023-08-04 VITALS
HEART RATE: 89 BPM | SYSTOLIC BLOOD PRESSURE: 110 MMHG | BODY MASS INDEX: 30.3 KG/M2 | OXYGEN SATURATION: 99 % | WEIGHT: 171 LBS | DIASTOLIC BLOOD PRESSURE: 80 MMHG | HEIGHT: 63 IN

## 2023-08-04 DIAGNOSIS — R60.0 BILATERAL LOWER EXTREMITY EDEMA: Primary | ICD-10-CM

## 2023-08-04 DIAGNOSIS — R20.0 NUMBNESS AND TINGLING OF BOTH LOWER EXTREMITIES: ICD-10-CM

## 2023-08-04 DIAGNOSIS — I83.893 VARICOSE VEINS OF BOTH LEGS WITH EDEMA: ICD-10-CM

## 2023-08-04 DIAGNOSIS — R20.2 NUMBNESS AND TINGLING OF BOTH LOWER EXTREMITIES: ICD-10-CM

## 2023-08-04 PROCEDURE — 99214 OFFICE O/P EST MOD 30 MIN: CPT | Performed by: NURSE PRACTITIONER

## 2023-08-04 PROCEDURE — G8417 CALC BMI ABV UP PARAM F/U: HCPCS | Performed by: NURSE PRACTITIONER

## 2023-08-04 PROCEDURE — 4004F PT TOBACCO SCREEN RCVD TLK: CPT | Performed by: NURSE PRACTITIONER

## 2023-08-04 PROCEDURE — G8427 DOCREV CUR MEDS BY ELIG CLIN: HCPCS | Performed by: NURSE PRACTITIONER

## 2023-08-04 ASSESSMENT — ENCOUNTER SYMPTOMS
SHORTNESS OF BREATH: 0
RESPIRATORY NEGATIVE: 1
VOMITING: 0
EYES NEGATIVE: 1
BACK PAIN: 1
ALLERGIC/IMMUNOLOGIC NEGATIVE: 1
NAUSEA: 0
GASTROINTESTINAL NEGATIVE: 1

## 2023-08-04 NOTE — PROGRESS NOTES
Vascular Medicine and Interventional Radiology:    Sara Hinton, a female of 41 y.o. came to the office 8/4/2023. Chief Complaint   Patient presents with    New Patient     Lymphedema- Salma Campo     HPI: Sara Hinton referred by Jose Little DO for evaluation of lymphedema. Patient presents with symptoms of: Bilateral lower extremity enlargement for many years associated with intermittent bilateral lower extremity swelling, \"cankles\" towards the end of the day, leg heaviness/fatigue and aching. She reports no swelling during office visit, but reveals pictures on her phone that show bilateral pedal/ankle edema. She reports all of her symptoms seemed to develop after her second spinal surgery. Symptoms are worse with standing or when seated with feet dependent for prolonged periods of time, does improve somewhat with limb elevation and walking. She additionally reports numbness/tingling to bilateral lower extremities, reports her toes feel cold at times. She has history of multiple spine surgeries, lumbar laminectomy. Recently had bladder stimulator inserted on 7/13/2023 to help control her urge incontinence. She does endorse weight gain over the past couple years. States she has tried loosing weight to see if this would help her leg symptoms, however states she is unable to loose weight despite watching her eating habits and exercising. NSAIDS: Tolerates pain, no meds. Leg elevation: Daily with suboptimal relief. Stocking use and dates: Has not done conservative therapy with daily consistent wearing of class two compression stockings for at least 6 weeks.    PMH includes GERD, asthma, abnormal uterine bleeding, ADHD, bipolar disorder, cervical radiculopathy, cervical genic headache, herniated nuclear us for pulses L4-5 right, lumbar laminectomy, Em's syndrome, intractable migraine, low back pain, lumbar radiculopathy, papilledema with increased ICP, paresthesia of upper and lower

## 2023-08-24 ENCOUNTER — TELEPHONE (OUTPATIENT)
Dept: OBGYN CLINIC | Age: 38
End: 2023-08-24

## 2023-09-01 ENCOUNTER — HOSPITAL ENCOUNTER (OUTPATIENT)
Dept: PREADMISSION TESTING | Age: 38
Discharge: HOME | End: 2023-09-05
Payer: COMMERCIAL

## 2023-09-01 VITALS
TEMPERATURE: 99 F | HEART RATE: 84 BPM | SYSTOLIC BLOOD PRESSURE: 113 MMHG | BODY MASS INDEX: 29.77 KG/M2 | RESPIRATION RATE: 18 BRPM | DIASTOLIC BLOOD PRESSURE: 67 MMHG | OXYGEN SATURATION: 100 % | WEIGHT: 168 LBS | HEIGHT: 63 IN

## 2023-09-01 LAB
ABO + RH BLD: NORMAL
BLD GP AB SCN SERPL QL: NORMAL

## 2023-09-01 PROCEDURE — 86901 BLOOD TYPING SEROLOGIC RH(D): CPT

## 2023-09-01 PROCEDURE — 86850 RBC ANTIBODY SCREEN: CPT

## 2023-09-01 PROCEDURE — 86900 BLOOD TYPING SEROLOGIC ABO: CPT

## 2023-09-05 ENCOUNTER — TELEPHONE (OUTPATIENT)
Dept: OBGYN CLINIC | Age: 38
End: 2023-09-05

## 2023-09-05 NOTE — TELEPHONE ENCOUNTER
Spoke to pt to follow up on peer to peer review.  Pt was informed per  surgery was approved after peer to peer completed 9-1-23

## 2023-09-06 ENCOUNTER — ANESTHESIA EVENT (OUTPATIENT)
Dept: OPERATING ROOM | Age: 38
End: 2023-09-06
Payer: COMMERCIAL

## 2023-09-07 ENCOUNTER — HOSPITAL ENCOUNTER (OUTPATIENT)
Age: 38
Setting detail: OUTPATIENT SURGERY
Discharge: HOME | End: 2023-09-07
Attending: OBSTETRICS & GYNECOLOGY | Admitting: OBSTETRICS & GYNECOLOGY
Payer: COMMERCIAL

## 2023-09-07 ENCOUNTER — ANESTHESIA (OUTPATIENT)
Dept: OPERATING ROOM | Age: 38
End: 2023-09-07
Payer: COMMERCIAL

## 2023-09-07 VITALS
TEMPERATURE: 97 F | SYSTOLIC BLOOD PRESSURE: 107 MMHG | OXYGEN SATURATION: 100 % | HEART RATE: 69 BPM | DIASTOLIC BLOOD PRESSURE: 68 MMHG | RESPIRATION RATE: 17 BRPM

## 2023-09-07 DIAGNOSIS — R10.2 FEMALE PELVIC PAIN: ICD-10-CM

## 2023-09-07 DIAGNOSIS — G89.18 POSTOPERATIVE PAIN: Primary | ICD-10-CM

## 2023-09-07 DIAGNOSIS — N93.9 ABNORMAL UTERINE BLEEDING (AUB): ICD-10-CM

## 2023-09-07 LAB
HCG, URINE, POC: NEGATIVE
Lab: NORMAL
NEGATIVE QC PASS/FAIL: NORMAL
POSITIVE QC PASS/FAIL: NORMAL

## 2023-09-07 PROCEDURE — 2580000003 HC RX 258: Performed by: OBSTETRICS & GYNECOLOGY

## 2023-09-07 PROCEDURE — 11982 REMOVE DRUG IMPLANT DEVICE: CPT | Performed by: OBSTETRICS & GYNECOLOGY

## 2023-09-07 PROCEDURE — 2720000010 HC SURG SUPPLY STERILE: Performed by: OBSTETRICS & GYNECOLOGY

## 2023-09-07 PROCEDURE — 6360000002 HC RX W HCPCS

## 2023-09-07 PROCEDURE — 58571 TLH W/T/O 250 G OR LESS: CPT | Performed by: OBSTETRICS & GYNECOLOGY

## 2023-09-07 PROCEDURE — 3600000014 HC SURGERY LEVEL 4 ADDTL 15MIN: Performed by: OBSTETRICS & GYNECOLOGY

## 2023-09-07 PROCEDURE — 2709999900 HC NON-CHARGEABLE SUPPLY: Performed by: OBSTETRICS & GYNECOLOGY

## 2023-09-07 PROCEDURE — 88307 TISSUE EXAM BY PATHOLOGIST: CPT

## 2023-09-07 PROCEDURE — 6370000000 HC RX 637 (ALT 250 FOR IP): Performed by: STUDENT IN AN ORGANIZED HEALTH CARE EDUCATION/TRAINING PROGRAM

## 2023-09-07 PROCEDURE — 6360000002 HC RX W HCPCS: Performed by: STUDENT IN AN ORGANIZED HEALTH CARE EDUCATION/TRAINING PROGRAM

## 2023-09-07 PROCEDURE — 64488 TAP BLOCK BI INJECTION: CPT | Performed by: STUDENT IN AN ORGANIZED HEALTH CARE EDUCATION/TRAINING PROGRAM

## 2023-09-07 PROCEDURE — 58662 LAPAROSCOPY EXCISE LESIONS: CPT | Performed by: OBSTETRICS & GYNECOLOGY

## 2023-09-07 PROCEDURE — 3700000001 HC ADD 15 MINUTES (ANESTHESIA): Performed by: OBSTETRICS & GYNECOLOGY

## 2023-09-07 PROCEDURE — 3600000004 HC SURGERY LEVEL 4 BASE: Performed by: OBSTETRICS & GYNECOLOGY

## 2023-09-07 PROCEDURE — 7100000001 HC PACU RECOVERY - ADDTL 15 MIN: Performed by: OBSTETRICS & GYNECOLOGY

## 2023-09-07 PROCEDURE — 6370000000 HC RX 637 (ALT 250 FOR IP)

## 2023-09-07 PROCEDURE — 2580000003 HC RX 258: Performed by: STUDENT IN AN ORGANIZED HEALTH CARE EDUCATION/TRAINING PROGRAM

## 2023-09-07 PROCEDURE — 7100000011 HC PHASE II RECOVERY - ADDTL 15 MIN: Performed by: OBSTETRICS & GYNECOLOGY

## 2023-09-07 PROCEDURE — 2500000003 HC RX 250 WO HCPCS

## 2023-09-07 PROCEDURE — 3700000000 HC ANESTHESIA ATTENDED CARE: Performed by: OBSTETRICS & GYNECOLOGY

## 2023-09-07 PROCEDURE — A4217 STERILE WATER/SALINE, 500 ML: HCPCS | Performed by: OBSTETRICS & GYNECOLOGY

## 2023-09-07 PROCEDURE — 88342 IMHCHEM/IMCYTCHM 1ST ANTB: CPT

## 2023-09-07 PROCEDURE — 7100000000 HC PACU RECOVERY - FIRST 15 MIN: Performed by: OBSTETRICS & GYNECOLOGY

## 2023-09-07 PROCEDURE — 7100000010 HC PHASE II RECOVERY - FIRST 15 MIN: Performed by: OBSTETRICS & GYNECOLOGY

## 2023-09-07 PROCEDURE — 6360000002 HC RX W HCPCS: Performed by: OBSTETRICS & GYNECOLOGY

## 2023-09-07 PROCEDURE — 88304 TISSUE EXAM BY PATHOLOGIST: CPT

## 2023-09-07 PROCEDURE — 2500000003 HC RX 250 WO HCPCS: Performed by: OBSTETRICS & GYNECOLOGY

## 2023-09-07 RX ORDER — PROCHLORPERAZINE EDISYLATE 5 MG/ML
5 INJECTION INTRAMUSCULAR; INTRAVENOUS
Status: DISCONTINUED | OUTPATIENT
Start: 2023-09-07 | End: 2023-09-07 | Stop reason: HOSPADM

## 2023-09-07 RX ORDER — SODIUM CHLORIDE 9 MG/ML
INJECTION, SOLUTION INTRAVENOUS PRN
Status: DISCONTINUED | OUTPATIENT
Start: 2023-09-07 | End: 2023-09-07 | Stop reason: HOSPADM

## 2023-09-07 RX ORDER — SODIUM CHLORIDE, SODIUM LACTATE, POTASSIUM CHLORIDE, CALCIUM CHLORIDE 600; 310; 30; 20 MG/100ML; MG/100ML; MG/100ML; MG/100ML
INJECTION, SOLUTION INTRAVENOUS CONTINUOUS
Status: DISCONTINUED | OUTPATIENT
Start: 2023-09-07 | End: 2023-09-07 | Stop reason: HOSPADM

## 2023-09-07 RX ORDER — HYDRALAZINE HYDROCHLORIDE 20 MG/ML
10 INJECTION INTRAMUSCULAR; INTRAVENOUS
Status: DISCONTINUED | OUTPATIENT
Start: 2023-09-07 | End: 2023-09-07 | Stop reason: HOSPADM

## 2023-09-07 RX ORDER — MEPERIDINE HYDROCHLORIDE 25 MG/ML
12.5 INJECTION INTRAMUSCULAR; INTRAVENOUS; SUBCUTANEOUS EVERY 5 MIN PRN
Status: DISCONTINUED | OUTPATIENT
Start: 2023-09-07 | End: 2023-09-07 | Stop reason: HOSPADM

## 2023-09-07 RX ORDER — CLINDAMYCIN PHOSPHATE 900 MG/50ML
900 INJECTION INTRAVENOUS
Status: COMPLETED | OUTPATIENT
Start: 2023-09-07 | End: 2023-09-07

## 2023-09-07 RX ORDER — HYDROCODONE BITARTRATE AND ACETAMINOPHEN 5; 325 MG/1; MG/1
1 TABLET ORAL ONCE
Status: COMPLETED | OUTPATIENT
Start: 2023-09-07 | End: 2023-09-07

## 2023-09-07 RX ORDER — KETOROLAC TROMETHAMINE 30 MG/ML
INJECTION, SOLUTION INTRAMUSCULAR; INTRAVENOUS PRN
Status: DISCONTINUED | OUTPATIENT
Start: 2023-09-07 | End: 2023-09-07 | Stop reason: SDUPTHER

## 2023-09-07 RX ORDER — HYDROMORPHONE HYDROCHLORIDE 1 MG/ML
1 INJECTION, SOLUTION INTRAMUSCULAR; INTRAVENOUS; SUBCUTANEOUS
Status: DISCONTINUED | OUTPATIENT
Start: 2023-09-07 | End: 2023-09-07 | Stop reason: HOSPADM

## 2023-09-07 RX ORDER — FENTANYL CITRATE 50 UG/ML
INJECTION, SOLUTION INTRAMUSCULAR; INTRAVENOUS PRN
Status: DISCONTINUED | OUTPATIENT
Start: 2023-09-07 | End: 2023-09-07 | Stop reason: SDUPTHER

## 2023-09-07 RX ORDER — SODIUM CHLORIDE 0.9 % (FLUSH) 0.9 %
5-40 SYRINGE (ML) INJECTION EVERY 12 HOURS SCHEDULED
Status: DISCONTINUED | OUTPATIENT
Start: 2023-09-07 | End: 2023-09-07 | Stop reason: HOSPADM

## 2023-09-07 RX ORDER — SODIUM CHLORIDE 0.9 % (FLUSH) 0.9 %
5-40 SYRINGE (ML) INJECTION PRN
Status: DISCONTINUED | OUTPATIENT
Start: 2023-09-07 | End: 2023-09-07 | Stop reason: HOSPADM

## 2023-09-07 RX ORDER — DOCUSATE SODIUM 100 MG/1
100 CAPSULE, LIQUID FILLED ORAL 2 TIMES DAILY PRN
Qty: 60 CAPSULE | Refills: 2 | Status: SHIPPED | OUTPATIENT
Start: 2023-09-07

## 2023-09-07 RX ORDER — ACETAMINOPHEN 500 MG
1000 TABLET ORAL EVERY 6 HOURS PRN
Qty: 60 TABLET | Refills: 0 | Status: SHIPPED | OUTPATIENT
Start: 2023-09-07

## 2023-09-07 RX ORDER — PROPOFOL 10 MG/ML
INJECTION, EMULSION INTRAVENOUS PRN
Status: DISCONTINUED | OUTPATIENT
Start: 2023-09-07 | End: 2023-09-07 | Stop reason: SDUPTHER

## 2023-09-07 RX ORDER — SIMETHICONE 80 MG
80 TABLET,CHEWABLE ORAL 4 TIMES DAILY PRN
Qty: 180 TABLET | Refills: 1 | Status: SHIPPED | OUTPATIENT
Start: 2023-09-07

## 2023-09-07 RX ORDER — ONDANSETRON 4 MG/1
4 TABLET, ORALLY DISINTEGRATING ORAL EVERY 8 HOURS PRN
Status: DISCONTINUED | OUTPATIENT
Start: 2023-09-07 | End: 2023-09-07 | Stop reason: HOSPADM

## 2023-09-07 RX ORDER — POLYETHYLENE GLYCOL 3350 17 G/17G
17 POWDER, FOR SOLUTION ORAL 2 TIMES DAILY PRN
Qty: 1020 G | Refills: 0 | Status: SHIPPED | OUTPATIENT
Start: 2023-09-07 | End: 2023-10-07

## 2023-09-07 RX ORDER — FENTANYL CITRATE 0.05 MG/ML
50 INJECTION, SOLUTION INTRAMUSCULAR; INTRAVENOUS EVERY 5 MIN PRN
Status: DISCONTINUED | OUTPATIENT
Start: 2023-09-07 | End: 2023-09-07 | Stop reason: HOSPADM

## 2023-09-07 RX ORDER — ONDANSETRON 2 MG/ML
INJECTION INTRAMUSCULAR; INTRAVENOUS PRN
Status: DISCONTINUED | OUTPATIENT
Start: 2023-09-07 | End: 2023-09-07 | Stop reason: SDUPTHER

## 2023-09-07 RX ORDER — MAGNESIUM HYDROXIDE 1200 MG/15ML
LIQUID ORAL CONTINUOUS PRN
Status: DISCONTINUED | OUTPATIENT
Start: 2023-09-07 | End: 2023-09-07 | Stop reason: HOSPADM

## 2023-09-07 RX ORDER — FENTANYL CITRATE 0.05 MG/ML
25 INJECTION, SOLUTION INTRAMUSCULAR; INTRAVENOUS EVERY 5 MIN PRN
Status: DISCONTINUED | OUTPATIENT
Start: 2023-09-07 | End: 2023-09-07 | Stop reason: HOSPADM

## 2023-09-07 RX ORDER — HYDROCODONE BITARTRATE AND ACETAMINOPHEN 5; 325 MG/1; MG/1
2 TABLET ORAL NIGHTLY PRN
Qty: 10 TABLET | Refills: 0 | Status: SHIPPED | OUTPATIENT
Start: 2023-09-07 | End: 2023-09-12

## 2023-09-07 RX ORDER — BUPIVACAINE HYDROCHLORIDE 5 MG/ML
INJECTION, SOLUTION EPIDURAL; INTRACAUDAL
Status: COMPLETED | OUTPATIENT
Start: 2023-09-07 | End: 2023-09-07

## 2023-09-07 RX ORDER — LABETALOL HYDROCHLORIDE 5 MG/ML
10 INJECTION, SOLUTION INTRAVENOUS
Status: DISCONTINUED | OUTPATIENT
Start: 2023-09-07 | End: 2023-09-07 | Stop reason: HOSPADM

## 2023-09-07 RX ORDER — ONDANSETRON 2 MG/ML
4 INJECTION INTRAMUSCULAR; INTRAVENOUS
Status: DISCONTINUED | OUTPATIENT
Start: 2023-09-07 | End: 2023-09-07 | Stop reason: HOSPADM

## 2023-09-07 RX ORDER — OXYCODONE HYDROCHLORIDE AND ACETAMINOPHEN 5; 325 MG/1; MG/1
2 TABLET ORAL EVERY EVENING
Qty: 10 TABLET | Refills: 0 | Status: SHIPPED | OUTPATIENT
Start: 2023-09-07 | End: 2023-09-07 | Stop reason: HOSPADM

## 2023-09-07 RX ORDER — OXYCODONE HYDROCHLORIDE 5 MG/1
5 TABLET ORAL EVERY 4 HOURS PRN
Status: DISCONTINUED | OUTPATIENT
Start: 2023-09-07 | End: 2023-09-07 | Stop reason: HOSPADM

## 2023-09-07 RX ORDER — ACETAMINOPHEN 500 MG
1000 TABLET ORAL EVERY 8 HOURS PRN
Status: DISCONTINUED | OUTPATIENT
Start: 2023-09-07 | End: 2023-09-07 | Stop reason: HOSPADM

## 2023-09-07 RX ORDER — SCOLOPAMINE TRANSDERMAL SYSTEM 1 MG/1
1 PATCH, EXTENDED RELEASE TRANSDERMAL ONCE
Status: COMPLETED | OUTPATIENT
Start: 2023-09-07 | End: 2023-09-07

## 2023-09-07 RX ORDER — WOUND DRESSING ADHESIVE - LIQUID
LIQUID MISCELLANEOUS PRN
Status: DISCONTINUED | OUTPATIENT
Start: 2023-09-07 | End: 2023-09-07 | Stop reason: ALTCHOICE

## 2023-09-07 RX ORDER — OXYCODONE HYDROCHLORIDE 5 MG/1
10 TABLET ORAL EVERY 4 HOURS PRN
Status: DISCONTINUED | OUTPATIENT
Start: 2023-09-07 | End: 2023-09-07 | Stop reason: HOSPADM

## 2023-09-07 RX ORDER — KETOROLAC TROMETHAMINE 30 MG/ML
30 INJECTION, SOLUTION INTRAMUSCULAR; INTRAVENOUS EVERY 6 HOURS
Status: DISCONTINUED | OUTPATIENT
Start: 2023-09-07 | End: 2023-09-07 | Stop reason: HOSPADM

## 2023-09-07 RX ORDER — IBUPROFEN 800 MG/1
800 TABLET ORAL EVERY 8 HOURS PRN
Qty: 60 TABLET | Refills: 0 | Status: SHIPPED | OUTPATIENT
Start: 2023-09-07

## 2023-09-07 RX ORDER — DIPHENHYDRAMINE HYDROCHLORIDE 50 MG/ML
12.5 INJECTION INTRAMUSCULAR; INTRAVENOUS
Status: DISCONTINUED | OUTPATIENT
Start: 2023-09-07 | End: 2023-09-07 | Stop reason: HOSPADM

## 2023-09-07 RX ORDER — ROCURONIUM BROMIDE 10 MG/ML
INJECTION, SOLUTION INTRAVENOUS PRN
Status: DISCONTINUED | OUTPATIENT
Start: 2023-09-07 | End: 2023-09-07 | Stop reason: SDUPTHER

## 2023-09-07 RX ORDER — ONDANSETRON 2 MG/ML
4 INJECTION INTRAMUSCULAR; INTRAVENOUS EVERY 6 HOURS PRN
Status: DISCONTINUED | OUTPATIENT
Start: 2023-09-07 | End: 2023-09-07 | Stop reason: HOSPADM

## 2023-09-07 RX ORDER — DEXAMETHASONE SODIUM PHOSPHATE 10 MG/ML
INJECTION INTRAMUSCULAR; INTRAVENOUS PRN
Status: DISCONTINUED | OUTPATIENT
Start: 2023-09-07 | End: 2023-09-07 | Stop reason: SDUPTHER

## 2023-09-07 RX ORDER — MIDAZOLAM HYDROCHLORIDE 1 MG/ML
INJECTION INTRAMUSCULAR; INTRAVENOUS PRN
Status: DISCONTINUED | OUTPATIENT
Start: 2023-09-07 | End: 2023-09-07 | Stop reason: SDUPTHER

## 2023-09-07 RX ORDER — LIDOCAINE HYDROCHLORIDE 10 MG/ML
INJECTION, SOLUTION EPIDURAL; INFILTRATION; INTRACAUDAL; PERINEURAL PRN
Status: DISCONTINUED | OUTPATIENT
Start: 2023-09-07 | End: 2023-09-07 | Stop reason: SDUPTHER

## 2023-09-07 RX ORDER — ONDANSETRON 4 MG/1
4 TABLET, FILM COATED ORAL EVERY 6 HOURS PRN
Qty: 30 TABLET | Refills: 1 | Status: SHIPPED | OUTPATIENT
Start: 2023-09-07

## 2023-09-07 RX ADMIN — HYDROMORPHONE HYDROCHLORIDE 1 MG: 1 INJECTION, SOLUTION INTRAMUSCULAR; INTRAVENOUS; SUBCUTANEOUS at 10:09

## 2023-09-07 RX ADMIN — DEXAMETHASONE SODIUM PHOSPHATE 10 MG: 10 INJECTION INTRAMUSCULAR; INTRAVENOUS at 07:55

## 2023-09-07 RX ADMIN — CLINDAMYCIN IN 5 PERCENT DEXTROSE 900 MG: 18 INJECTION, SOLUTION INTRAVENOUS at 07:40

## 2023-09-07 RX ADMIN — KETOROLAC TROMETHAMINE 30 MG: 30 INJECTION, SOLUTION INTRAMUSCULAR; INTRAVENOUS at 08:51

## 2023-09-07 RX ADMIN — ONDANSETRON 4 MG: 2 INJECTION INTRAMUSCULAR; INTRAVENOUS at 07:55

## 2023-09-07 RX ADMIN — ROCURONIUM BROMIDE 50 MG: 10 INJECTION, SOLUTION INTRAVENOUS at 07:36

## 2023-09-07 RX ADMIN — SODIUM CHLORIDE: 9 INJECTION, SOLUTION INTRAVENOUS at 07:31

## 2023-09-07 RX ADMIN — LIDOCAINE HYDROCHLORIDE 40 MG: 10 INJECTION, SOLUTION EPIDURAL; INFILTRATION; INTRACAUDAL; PERINEURAL at 07:36

## 2023-09-07 RX ADMIN — MIDAZOLAM HYDROCHLORIDE 2 MG: 1 INJECTION, SOLUTION INTRAMUSCULAR; INTRAVENOUS at 07:36

## 2023-09-07 RX ADMIN — SODIUM CHLORIDE 1000 ML: 9 INJECTION, SOLUTION INTRAVENOUS at 06:53

## 2023-09-07 RX ADMIN — FENTANYL CITRATE 50 MCG: 0.05 INJECTION, SOLUTION INTRAMUSCULAR; INTRAVENOUS at 09:50

## 2023-09-07 RX ADMIN — SODIUM CHLORIDE: 9 INJECTION, SOLUTION INTRAVENOUS at 08:21

## 2023-09-07 RX ADMIN — MIDAZOLAM HYDROCHLORIDE 2 MG: 1 INJECTION, SOLUTION INTRAMUSCULAR; INTRAVENOUS at 07:29

## 2023-09-07 RX ADMIN — ROCURONIUM BROMIDE 20 MG: 10 INJECTION, SOLUTION INTRAVENOUS at 08:02

## 2023-09-07 RX ADMIN — ROCURONIUM BROMIDE 20 MG: 10 INJECTION, SOLUTION INTRAVENOUS at 08:35

## 2023-09-07 RX ADMIN — SODIUM CHLORIDE 1000 ML: 9 INJECTION, SOLUTION INTRAVENOUS at 09:15

## 2023-09-07 RX ADMIN — FENTANYL CITRATE 50 MCG: 50 INJECTION, SOLUTION INTRAMUSCULAR; INTRAVENOUS at 07:42

## 2023-09-07 RX ADMIN — FENTANYL CITRATE 50 MCG: 50 INJECTION, SOLUTION INTRAMUSCULAR; INTRAVENOUS at 07:36

## 2023-09-07 RX ADMIN — GENTAMICIN SULFATE 381.2 MG: 40 INJECTION, SOLUTION INTRAMUSCULAR; INTRAVENOUS at 07:47

## 2023-09-07 RX ADMIN — BUPIVACAINE HYDROCHLORIDE 40 ML: 5 INJECTION, SOLUTION EPIDURAL; INTRACAUDAL at 07:43

## 2023-09-07 RX ADMIN — HYDROCODONE BITARTRATE AND ACETAMINOPHEN 1 TABLET: 5; 325 TABLET ORAL at 11:05

## 2023-09-07 RX ADMIN — SCOPALAMINE 1 PATCH: 1 PATCH, EXTENDED RELEASE TRANSDERMAL at 07:24

## 2023-09-07 RX ADMIN — PROPOFOL INJECTABLE EMULSION 160 MG: 10 INJECTION, EMULSION INTRAVENOUS at 07:36

## 2023-09-07 RX ADMIN — PROPOFOL INJECTABLE EMULSION 200 MCG/KG/MIN: 10 INJECTION, EMULSION INTRAVENOUS at 07:37

## 2023-09-07 ASSESSMENT — PAIN DESCRIPTION - PAIN TYPE
TYPE: SURGICAL PAIN

## 2023-09-07 ASSESSMENT — PAIN DESCRIPTION - ORIENTATION
ORIENTATION: ANTERIOR
ORIENTATION: MID;LOWER

## 2023-09-07 ASSESSMENT — PAIN DESCRIPTION - DESCRIPTORS
DESCRIPTORS: ACHING;CRAMPING;SHARP
DESCRIPTORS: ACHING;CRAMPING;SHARP;THROBBING
DESCRIPTORS: ACHING;CRAMPING
DESCRIPTORS: CRAMPING

## 2023-09-07 ASSESSMENT — PAIN DESCRIPTION - LOCATION
LOCATION: ABDOMEN

## 2023-09-07 ASSESSMENT — PAIN SCALES - GENERAL
PAINLEVEL_OUTOF10: 5
PAINLEVEL_OUTOF10: 6
PAINLEVEL_OUTOF10: 8

## 2023-09-07 ASSESSMENT — PAIN DESCRIPTION - ONSET
ONSET: ON-GOING
ONSET: ON-GOING

## 2023-09-07 ASSESSMENT — PAIN DESCRIPTION - FREQUENCY
FREQUENCY: CONTINUOUS
FREQUENCY: CONTINUOUS

## 2023-09-07 NOTE — FLOWSHEET NOTE
[facility] called about pt allergy to oxycodone. Called Dr. Kia Oconnor for medication change to vicodin. Pt can tolerate that.  RH

## 2023-09-07 NOTE — ANESTHESIA PRE PROCEDURE
Department of Anesthesiology  Preprocedure Note       Name:  Trudi Forte   Age:  41 y.o.  :  1985                                          MRN:  [id number]         Date:  2023      Surgeon: Bozena Hahn):  Sarita Grewal MD    Procedure: Procedure(s):  Total Laparoscopic Hysterectomy    Medications prior to admission:   Prior to Admission medications    Medication Sig Start Date End Date Taking? Authorizing Provider   Elastic Bandages & Supports (MEDICAL COMPRESSION STOCKINGS) MISC 1 each by Does not apply route daily Thigh-high. 20-30 mmHg. Open or close toed (patient preference). Wear daily during the day and remove every evening before bed. Wear as tolerated. Do not wear if they cause increased pain.  23   Rudolfo Romberg, APRN - CNP   ibuprofen (ADVIL;MOTRIN) 800 MG tablet Take 1 tablet by mouth every 8 hours as needed for Pain 23   Sarita Grewal MD   acetaminophen (TYLENOL) 500 MG tablet Take 2 tablets by mouth every 6 hours as needed for Pain 23   Sarita Grewal MD   ondansetron (ZOFRAN) 4 MG tablet Take 1 tablet by mouth every 6 hours as needed for Nausea or Vomiting 1 tablet every 6 hrs prn for nausea and vomiting. 23   Sarita Grewal MD   AJOVY 225 MG/1.5ML SOAJ Inject 225 mg into the muscle every 30 days 6/15/23   Historical Provider, MD   UBRELVY 100 MG TABS Take 100 mg by mouth as needed 23   Historical Provider, MD   aluminum chloride (DRYSOL) 20 % external solution APPLY topically NIGHTLY 23   Nancy Jama DO   pantoprazole (PROTONIX) 40 MG tablet Take 1 tablet by mouth every morning (before breakfast) 23   Nancy Jama DO   polyethylene glycol (MIRALAX) 17 GM/SCOOP powder Use ONE bottle in AM 23   Akhil Kruse MD   clindamycin (CLEOCIN) 2 % vaginal cream USE one applicatorful vaginally nightly daily for 1 week prior to procedure 23   Sarita Grewal MD   famotidine (PEPCID) 20 MG tablet Take 1 tablet by mouth 2 times daily as needed

## 2023-09-07 NOTE — PROGRESS NOTES
Patient complained of \"itching\" on abdomen after CHG wipes. No rash or redness to abdomen noted at this time.

## 2023-09-07 NOTE — OP NOTE
Operative Note      Patient: Earnestine Lyon  YOB: 1985  MRN: [id number]    Date of Procedure: 2023    Pre-Op Diagnosis Codes:     * Abnormal uterine bleeding (AUB) [N93.9]     * Female pelvic pain [R10.2]    Post-Op Diagnosis:  left ovarian cyst , endometriosis        Procedure(s):  Total Laparoscopic Hysterectomy Bilateral Salpingectomy Left Ovarian Cyst Removal Left Arm Nexplanon Removal    Surgeon(s):  Olman Ortiz MD    Assistant:   First Assistant: Amelie Manning    Anesthesia: General    Estimated Blood Loss (mL): Minimal    Complications: None    Specimens:   ID Type Source Tests Collected by Time Destination   A : UTERUS, CERVIX, BILATERAL FALLOPIAN TUBES Tissue Uterus SURGICAL PATHOLOGY Olman Ortiz MD 2023    B : Left Ovarian Cyst Tissue Ovary SURGICAL PATHOLOGY Olman Ortiz MD 2023        Implants:  * No implants in log *      Drains:   Urinary Catheter 23 Thompson (Active)       Findings: endometriosis , left simple ovarian cyst .         Detailed Description of Procedure:       Procedure Details      The patient was seen in the Holding Room. The risks, benefits, complications, treatment options, and expected outcomes were discussed with the patient. The patient concurred with the proposed plan, giving informed consent. The patient was taken to Operating Room,identified as name,  the procedure verified as TLH with bilateral salpingectomy . A Time Out was held and the above information confirmed. After induction of anesthesia, the patient was draped and prepped in the usual sterile manner. A Thompson catheter was inserted into her bladder. Two retractors were placed into the vagina. A single tooth tenaculum was used to grasp the anterior lip of the cervix. The V-Care uterine manipulator  was placed and the retractors were removed. Attention was then turned to the abdomen into the supra-umbilical area.  A 5 mm skin incision was made 1 cm above the umbilicus . A Veress needle was   then inserted. The initial pressure was 5mmhg . Pneumoperitoneum was created till 15mmhg of CO2 gas pressure was recorded on insuflator. The laparoscope was then inserted in the 5 mm trocar, and safe entry was confirmed under direct visualizaion through the trocar. Patient was then placed in [place] and a survey of the abdomen and pelvis was performed which revealed  normal ovaries bilaterally , simple left ovarian cyst , endometriosis implants on posterior lower uterus and cuff, peritoneal scarring and changes related to chronic inflammation from endometriosis . Ureters were identified bilaterally as well. At that point, decision was for three other trocars which were then inserted under direct visualization. On the left side slightly below the level of the umbilicus and 8 cm lateral to the midline, a 12mm incision was made through which a 12 mm trocar was inserted, then on the same side approximately 15 cm lateral to the midline and at the level of the anterior superior iliac spine, a 5 mm incision was made through which a 5 mm trocar was inserted under direct visualization as well. On the right side, 12 cm lateral to the midline and 5 cm below the level of the umbilicus another 5 mm incision was made through which a 5 mm trocar was inserted. Attention was then towards the right and left fallopian tubes were then removed using the harmonic ace device cutting through mesosalpinx starting at the distal end of each and ending a the respective proximal tube attachment on each side. The right and left ovaries were preserved. Tubes were removed from the abdomen using laparoscopic graspers through the 12 mm port. Attention was then towards the left utero-ovarian ligament which was then grasped , coagulated and cut using the Harmonic ace device from [employer].  The bipolar cautery was also used on the field for any non hemostatic areas or bleeding pedicles and was used

## 2023-09-07 NOTE — ANESTHESIA PROCEDURE NOTES
Peripheral Block    Patient location during procedure: pre-op  Reason for block: post-op pain management and at surgeon's request  Start time: 9/7/2023 7:37 AM  End time: 9/7/2023 7:43 AM  Staffing  Performed: anesthesiologist   Anesthesiologist: Zora Leyva MD  Preanesthetic Checklist  Completed: patient identified, IV checked, site marked, risks and benefits discussed, surgical/procedural consents, equipment checked, pre-op evaluation, timeout performed, anesthesia consent given, oxygen available and monitors applied/VS acknowledged  Peripheral Block   Patient position: supine  Prep: ChloraPrep  Provider prep: mask and sterile gloves (Sterile probe cover)  Patient monitoring: cardiac monitor, continuous pulse ox, frequent blood pressure checks and IV access  Block type: TAP  Laterality: bilateral  Injection technique: single-shot  Guidance: nerve stimulator and ultrasound guided  Local infiltration: bupivacaine  Infiltration strength: 0.5 %  Local infiltration: bupivacaine  Dose: 40 mL    Needle   Needle type: combined needle/nerve stimulator   Needle gauge: 22 G  Needle localization: anatomical landmarks and ultrasound guidance  Needle length: 10 cm  Assessment   Injection assessment: negative aspiration for heme, no paresthesia on injection and local visualized surrounding nerve on ultrasound  Paresthesia pain: none  Slow fractionated injection: yes  Hemodynamics: stable  Real-time US image taken/store: yes  Outcomes: uncomplicated    Additional Notes  Ultrasound image printed and saved in patient chart.     Sterile probe cover used    20 mls given per side  Medications Administered  bupivacaine (MARCAINE) PF injection 0.5% - Perineural   40 mL - 9/7/2023 7:43:00 AM

## 2023-09-07 NOTE — FLOWSHEET NOTE
Discharge instruction given to patient and mother, verbalizes understanding with no further questions. Medicated patient for pain per [facility], time written on instruction when patient last took pain medicine. VSS. Scant drainage noted on peripad, abd band aids cdi. Patient ambulated to bathroom and tolerated well. Patient eating and drinking without nausea. IV out, patient being discharged.

## 2023-09-07 NOTE — PROGRESS NOTES
CLINICAL PHARMACY NOTE: MEDS TO BEDS    Total # of Prescriptions Filled: 6   The following medications were delivered to the patient:  Docusate 100 mg cap  Ibuprofen 800 mg tab  Gas Relief 80mg chew   Acetaminophen 500 mg tab  Ondansetron 4mg tab  Polyethylene Glycol 3350 powd   Additional Documentation:

## 2023-09-07 NOTE — ADDENDUM NOTE
Addendum  created 09/07/23 1032 by Kwan Jay MD    Order list changed, Pharmacy for encounter modified

## 2023-09-07 NOTE — ANESTHESIA POSTPROCEDURE EVALUATION
Department of Anesthesiology  Postprocedure Note    Patient: Renetta Isbell  MRN: [id number]  [address] [facility] 1985  Date of evaluation: 9/7/2023      Procedure Summary     Date: 09/07/23 Room / Location: [facility] [address]    Anesthesia Start: 0730 Anesthesia Stop: 0720    Procedure: Total Laparoscopic Hysterectomy Bilateral Salpingectomy Left Ovarian Cyst Removal Left Arm Nexplanon Removal (Abdomen/Perineum) Diagnosis:       Abnormal uterine bleeding (AUB)      Female pelvic pain      (Abnormal uterine bleeding (AUB) [N93.9])      (Female pelvic pain [R10.2])    Surgeons: Farrah White MD Responsible Provider: Carlos Allred MD    Anesthesia Type: general ASA Status: 2          Anesthesia Type: No value filed.     Aniya Phase I: Aniya Score: 10    Aniya Phase II:        Anesthesia Post Evaluation    Patient location during evaluation: bedside  Patient participation: complete - patient participated  Level of consciousness: awake and awake and alert  Airway patency: patent  Nausea & Vomiting: no nausea and no vomiting  Complications: no  Cardiovascular status: blood pressure returned to baseline and hemodynamically stable  Respiratory status: acceptable  Hydration status: euvolemic  Pain management: adequate

## 2023-09-07 NOTE — FLOWSHEET NOTE
6586   - Pt came from the operating room. Snoring. Chin lift performed. Mask O2 mainatined. Pressure 78/52. HOB lowered to increase BP. Airway obstructed more. Needed to maintain semi fowlers position for airway management. Still snoring but O2 sat maintained at 99%.  RH

## 2023-09-20 ENCOUNTER — OFFICE VISIT (OUTPATIENT)
Dept: OBGYN CLINIC | Age: 38
End: 2023-09-20

## 2023-09-20 VITALS
SYSTOLIC BLOOD PRESSURE: 96 MMHG | HEART RATE: 88 BPM | HEIGHT: 63 IN | BODY MASS INDEX: 30.12 KG/M2 | WEIGHT: 170 LBS | DIASTOLIC BLOOD PRESSURE: 62 MMHG

## 2023-09-20 DIAGNOSIS — Z09 POSTOP CHECK: Primary | ICD-10-CM

## 2023-09-20 PROCEDURE — 99024 POSTOP FOLLOW-UP VISIT: CPT | Performed by: OBSTETRICS & GYNECOLOGY

## 2023-09-27 NOTE — PROGRESS NOTES
Postop Progress Note    Subjective    presents to the office for postop follow up. 2 weeks    Objective    Vitals:    05/26/23 0852   BP: 106/64   Pulse: 76     General: alert, cooperative and no distress  Incision: healing well  Vag exam: deferred  Buttock exam : incision for interstim , healing adequately      Assessment  Doing well postoperatively. Plan    Continue postop restrictions including no heavy lifting or straining   No intercourse until next visit   Use stool softeners and pain meds   Return in as needed      Afshin Level M.D., F.A.C.O. G

## 2023-11-01 ENCOUNTER — OFFICE VISIT (OUTPATIENT)
Dept: OBGYN CLINIC | Age: 38
End: 2023-11-01

## 2023-11-01 VITALS
HEART RATE: 86 BPM | DIASTOLIC BLOOD PRESSURE: 66 MMHG | WEIGHT: 170 LBS | BODY MASS INDEX: 30.11 KG/M2 | SYSTOLIC BLOOD PRESSURE: 110 MMHG

## 2023-11-01 DIAGNOSIS — Z09 POSTOP CHECK: Primary | ICD-10-CM

## 2023-11-01 PROCEDURE — 99024 POSTOP FOLLOW-UP VISIT: CPT | Performed by: OBSTETRICS & GYNECOLOGY

## 2023-11-03 ENCOUNTER — SPECIALTY PHARMACY (OUTPATIENT)
Dept: PHARMACY | Facility: CLINIC | Age: 38
End: 2023-11-03

## 2023-11-03 ENCOUNTER — PHARMACY VISIT (OUTPATIENT)
Dept: PHARMACY | Facility: CLINIC | Age: 38
End: 2023-11-03
Payer: MEDICAID

## 2023-11-03 PROCEDURE — RXMED WILLOW AMBULATORY MEDICATION CHARGE

## 2023-11-06 ENCOUNTER — SPECIALTY PHARMACY (OUTPATIENT)
Dept: PHARMACY | Facility: CLINIC | Age: 38
End: 2023-11-06

## 2023-11-07 DIAGNOSIS — R60.0 BILATERAL LOWER EXTREMITY EDEMA: Primary | ICD-10-CM

## 2023-11-07 DIAGNOSIS — I83.893 VARICOSE VEINS OF BOTH LEGS WITH EDEMA: ICD-10-CM

## 2023-11-13 RX ORDER — FAMOTIDINE 20 MG/1
20 TABLET, FILM COATED ORAL 2 TIMES DAILY PRN
Qty: 60 TABLET | Refills: 3 | Status: SHIPPED | OUTPATIENT
Start: 2023-11-13

## 2023-11-17 ENCOUNTER — OFFICE VISIT (OUTPATIENT)
Dept: FAMILY MEDICINE CLINIC | Age: 38
End: 2023-11-17
Payer: COMMERCIAL

## 2023-11-17 VITALS
OXYGEN SATURATION: 95 % | DIASTOLIC BLOOD PRESSURE: 68 MMHG | HEIGHT: 63 IN | SYSTOLIC BLOOD PRESSURE: 110 MMHG | WEIGHT: 170 LBS | HEART RATE: 102 BPM | BODY MASS INDEX: 30.12 KG/M2 | TEMPERATURE: 98.3 F

## 2023-11-17 DIAGNOSIS — R61 EXCESSIVE SWEATING: ICD-10-CM

## 2023-11-17 DIAGNOSIS — K21.00 GASTROESOPHAGEAL REFLUX DISEASE WITH ESOPHAGITIS WITHOUT HEMORRHAGE: Primary | ICD-10-CM

## 2023-11-17 PROCEDURE — 4004F PT TOBACCO SCREEN RCVD TLK: CPT | Performed by: STUDENT IN AN ORGANIZED HEALTH CARE EDUCATION/TRAINING PROGRAM

## 2023-11-17 PROCEDURE — 99214 OFFICE O/P EST MOD 30 MIN: CPT | Performed by: STUDENT IN AN ORGANIZED HEALTH CARE EDUCATION/TRAINING PROGRAM

## 2023-11-17 PROCEDURE — G8417 CALC BMI ABV UP PARAM F/U: HCPCS | Performed by: STUDENT IN AN ORGANIZED HEALTH CARE EDUCATION/TRAINING PROGRAM

## 2023-11-17 PROCEDURE — G8484 FLU IMMUNIZE NO ADMIN: HCPCS | Performed by: STUDENT IN AN ORGANIZED HEALTH CARE EDUCATION/TRAINING PROGRAM

## 2023-11-17 PROCEDURE — G8427 DOCREV CUR MEDS BY ELIG CLIN: HCPCS | Performed by: STUDENT IN AN ORGANIZED HEALTH CARE EDUCATION/TRAINING PROGRAM

## 2023-11-17 RX ORDER — PANTOPRAZOLE SODIUM 40 MG/1
40 TABLET, DELAYED RELEASE ORAL
Qty: 90 TABLET | Refills: 1 | Status: CANCELLED | OUTPATIENT
Start: 2023-11-17

## 2023-11-17 RX ORDER — LANSOPRAZOLE 30 MG/1
30 CAPSULE, DELAYED RELEASE ORAL DAILY
Qty: 90 CAPSULE | Refills: 1 | Status: SHIPPED | OUTPATIENT
Start: 2023-11-17

## 2023-11-17 RX ORDER — LISDEXAMFETAMINE DIMESYLATE 40 MG/1
CAPSULE ORAL
COMMUNITY
Start: 2023-11-15

## 2023-11-17 ASSESSMENT — ENCOUNTER SYMPTOMS
VOMITING: 0
ABDOMINAL PAIN: 0
WHEEZING: 0
COUGH: 0
SHORTNESS OF BREATH: 0
RHINORRHEA: 0
SORE THROAT: 0
DIARRHEA: 0
NAUSEA: 0

## 2023-11-17 NOTE — PROGRESS NOTES
Subjective  Carrie Oconnor, 41 y.o. female presents today with:  Chief Complaint   Patient presents with    Follow-up    Gastroesophageal Reflux     Is not controlled. Did have EGD & colonoscopy done in April 2023. Patient with 6-month follow-up appointment. Patient following up on GERD. She is currently on pantoprazole. This is not controlling her symptoms. She was previously on Prevacid. She would like to try to go back on this. She did have an EGD and colonoscopy done in April of this year. She was noted to have esophagitis. We will plan to restart the Prevacid. Patient also with excessive sweating. She is using Drysol for this. She does need a refill. It is working well. She states that her sweating is even improved from her recent hysterectomy. She is overall doing better. She has no other concerns at this time. Review of Systems   Constitutional:  Negative for chills, fatigue, fever and unexpected weight change. HENT:  Negative for congestion, rhinorrhea and sore throat. Respiratory:  Negative for cough, shortness of breath and wheezing. Cardiovascular:  Negative for chest pain, palpitations and leg swelling. Gastrointestinal:  Negative for abdominal pain, diarrhea, nausea and vomiting. Musculoskeletal:  Negative for arthralgias and joint swelling. Skin:  Negative for rash. Neurological:  Negative for weakness, light-headedness, numbness and headaches. Psychiatric/Behavioral:  Negative for confusion and suicidal ideas. The patient is not nervous/anxious. Past Medical History:   Diagnosis Date    Asthma     exercise induced    Migraines      Past Surgical History:   Procedure Laterality Date    BACK SURGERY      2006    BACK SURGERY  08/05/2021    BREAST ENHANCEMENT SURGERY      COLONOSCOPY      prior to 2015, dr Méndez Rad?     COLONOSCOPY N/A 04/20/2023    COLONOSCOPY DIAGNOSTIC performed by Rosa Sue MD at [address] [place] [address]

## 2023-11-28 ENCOUNTER — TELEMEDICINE (OUTPATIENT)
Dept: NEUROLOGY | Facility: CLINIC | Age: 38
End: 2023-11-28
Payer: COMMERCIAL

## 2023-11-28 ENCOUNTER — PHARMACY VISIT (OUTPATIENT)
Dept: PHARMACY | Facility: CLINIC | Age: 38
End: 2023-11-28
Payer: MEDICAID

## 2023-11-28 DIAGNOSIS — G43.709 CHRONIC MIGRAINE W/O AURA W/O STATUS MIGRAINOSUS, NOT INTRACTABLE: Primary | ICD-10-CM

## 2023-11-28 DIAGNOSIS — R41.3 MEMORY IMPAIRMENT: ICD-10-CM

## 2023-11-28 PROCEDURE — RXMED WILLOW AMBULATORY MEDICATION CHARGE

## 2023-11-28 PROCEDURE — 99214 OFFICE O/P EST MOD 30 MIN: CPT | Performed by: STUDENT IN AN ORGANIZED HEALTH CARE EDUCATION/TRAINING PROGRAM

## 2023-11-28 RX ORDER — ERENUMAB-AOOE 140 MG/ML
140 INJECTION, SOLUTION SUBCUTANEOUS
Qty: 1 ML | Refills: 6 | Status: SHIPPED | OUTPATIENT
Start: 2023-11-28 | End: 2023-12-29 | Stop reason: WASHOUT

## 2023-11-28 NOTE — PROGRESS NOTES
Virtual or Telephone Consent    An interactive audio and video telecommunication system which permits real time communications between the patient (at the originating site) and provider (at the distant site) was utilized to provide this telehealth service.   Verbal consent was requested and obtained from Joceline Hess on this date, 11/28/23 for a telehealth visit.     Subjective   Joceline Hess is a 38 y.o.   female who is being followed for chronic migraine without aura.     Since last seen, patient states that she did not get Ajovy on time and it is not lasting >2 weeks. Notes that she is having significant worsening of pain with injections. Would like to switch to a different MAB. Ubrelvy relieves headaches, but does not fully resolve. Having 21/30 HA days per month now.     Notes that she will be switching to a new insurance in a month.     Has not been able to set up an appointment with ophthalmology due to her insurance.      Current Outpatient Medications   Medication Instructions    ubrogepant (Ubrelvy) 100 mg tablet tablet TAKE ONE (1) TABLET BY MOUTH AT ONSET OF MIGRAINE.     Assessment/Plan   Patient with chronic migraine without aura. Ajovy no longer providing benefit it used to, suspect wear off. Per our discussion, will switch to Aimovig at this time. For breakthrough headaches, will continue Ubrelvy. Unable to set up appointment with ophthalmology due to insurance, but looking into sites outside of . Referral to neuropsych for memory impairment may have been lost in EPIC conversion and patient would like rereferral.    - discontinue Ajovy  - start Aimovig 140mg monthly  - continue Ubrelvy 100mg PRN  - referral to neuropsych  - Follow up in 4 months    I personally spent 35 minutes today, exclusive of procedures, providing care for this patient, including preparation, face to face time, documentation and other services such as review of medical records, diagnostic result, patient education,  counseling, coordination of care as specified in the encounter.

## 2023-11-30 ENCOUNTER — SPECIALTY PHARMACY (OUTPATIENT)
Dept: PHARMACY | Facility: CLINIC | Age: 38
End: 2023-11-30

## 2023-12-29 ENCOUNTER — SPECIALTY PHARMACY (OUTPATIENT)
Dept: PHARMACY | Facility: CLINIC | Age: 38
End: 2023-12-29

## 2023-12-29 ENCOUNTER — TELEPHONE (OUTPATIENT)
Dept: NEUROLOGY | Facility: CLINIC | Age: 38
End: 2023-12-29

## 2023-12-29 ENCOUNTER — PHARMACY VISIT (OUTPATIENT)
Dept: PHARMACY | Facility: CLINIC | Age: 38
End: 2023-12-29
Payer: MEDICAID

## 2023-12-29 DIAGNOSIS — G43.709 CHRONIC MIGRAINE W/O AURA W/O STATUS MIGRAINOSUS, NOT INTRACTABLE: Primary | ICD-10-CM

## 2023-12-29 PROCEDURE — RXMED WILLOW AMBULATORY MEDICATION CHARGE

## 2023-12-29 RX ORDER — FREMANEZUMAB-VFRM 225 MG/1.5ML
225 INJECTION SUBCUTANEOUS
Qty: 1.5 ML | Refills: 6 | Status: SHIPPED | OUTPATIENT
Start: 2023-12-29 | End: 2024-02-27 | Stop reason: WASHOUT

## 2024-01-02 ENCOUNTER — SPECIALTY PHARMACY (OUTPATIENT)
Dept: PHARMACY | Facility: CLINIC | Age: 39
End: 2024-01-02

## 2024-01-03 ENCOUNTER — PHARMACY VISIT (OUTPATIENT)
Dept: PHARMACY | Facility: CLINIC | Age: 39
End: 2024-01-03

## 2024-01-29 ENCOUNTER — SPECIALTY PHARMACY (OUTPATIENT)
Dept: PHARMACY | Facility: CLINIC | Age: 39
End: 2024-01-29

## 2024-01-29 ENCOUNTER — PHARMACY VISIT (OUTPATIENT)
Dept: PHARMACY | Facility: CLINIC | Age: 39
End: 2024-01-29
Payer: MEDICAID

## 2024-01-29 PROCEDURE — RXMED WILLOW AMBULATORY MEDICATION CHARGE

## 2024-01-31 ENCOUNTER — OFFICE VISIT (OUTPATIENT)
Dept: INTERVENTIONAL RADIOLOGY/VASCULAR | Age: 39
End: 2024-01-31
Payer: COMMERCIAL

## 2024-01-31 ENCOUNTER — PHARMACY VISIT (OUTPATIENT)
Dept: PHARMACY | Facility: CLINIC | Age: 39
End: 2024-01-31

## 2024-01-31 VITALS
BODY MASS INDEX: 27.07 KG/M2 | DIASTOLIC BLOOD PRESSURE: 77 MMHG | HEART RATE: 77 BPM | WEIGHT: 152.8 LBS | RESPIRATION RATE: 18 BRPM | SYSTOLIC BLOOD PRESSURE: 120 MMHG | OXYGEN SATURATION: 98 % | HEIGHT: 63 IN

## 2024-01-31 DIAGNOSIS — R93.6 ABNORMAL ULTRASOUND OF LOWER EXTREMITY: ICD-10-CM

## 2024-01-31 DIAGNOSIS — R20.2 NUMBNESS AND TINGLING OF BOTH LOWER EXTREMITIES: ICD-10-CM

## 2024-01-31 DIAGNOSIS — R60.0 BILATERAL LOWER EXTREMITY EDEMA: Primary | ICD-10-CM

## 2024-01-31 DIAGNOSIS — R20.0 NUMBNESS AND TINGLING OF BOTH LOWER EXTREMITIES: ICD-10-CM

## 2024-01-31 PROCEDURE — G8484 FLU IMMUNIZE NO ADMIN: HCPCS | Performed by: NURSE PRACTITIONER

## 2024-01-31 PROCEDURE — 4004F PT TOBACCO SCREEN RCVD TLK: CPT | Performed by: NURSE PRACTITIONER

## 2024-01-31 PROCEDURE — 99214 OFFICE O/P EST MOD 30 MIN: CPT | Performed by: NURSE PRACTITIONER

## 2024-01-31 PROCEDURE — G8427 DOCREV CUR MEDS BY ELIG CLIN: HCPCS | Performed by: NURSE PRACTITIONER

## 2024-01-31 PROCEDURE — G8417 CALC BMI ABV UP PARAM F/U: HCPCS | Performed by: NURSE PRACTITIONER

## 2024-01-31 NOTE — PROGRESS NOTES
monophasic peripheral blood flow in the distal left peroneal artery. There is borderline biphasic blood flow in the distal left anterior tibial artery and distal left posterior tibial artery. The remainder of the   left lower extremity arteries demonstrate normal triphasic waveform blood flow.     Finalized by Edmar Wilson MD on Thu Dec 7, 2023  8:24 AM     IMPRESSION:     ARTERIAL DISEASE NOTED IN THE DISTAL LEFT PERONEAL ARTERY. THERE IS POSSIBLE MILD TO MODERATE DISEASE NOTED IN THE LEFT ANTERIOR TIBIAL, POSTERIOR TIBIAL ARTERIES AND POSSIBLY THE DISTAL RIGHT PERONEAL ARTERY. IF THERE IS INCREASED CLINICAL SUSPICION,   CTA OR MRA MAY BE OBTAINED FOR DETAILED EVALUATION.      Assessment and Plan:  Bronwyn Sterling 39 y.o. female w/C/O lymphedema. No edema during office visit today. Evidence of accumulation of adipose tissue to bilateral thighs, although do not appear to be out of proportion. No evidence of tissue fibrosis, papillomatosis, lipodermatosclerosis, or nummular dermatitis. Although patient w/pictures of bilateral pedal/ankle edema on her phone, resolves overnight. Recent BLE venous US negative for CVI. Question possible mild lymphedema/lipedema.   Additionally with numbness/tingling of BLE, US BLE arteries suggestive of arterial disease. Differential for numbness/tingling includes neurogenic given her hx of lumbar spine surgeries versus Raynaud's (patient reports history of) versus arterial.    1. Bilateral lower extremity edema  --Wear daily thigh high compression 20-30 mmhg daily during day and off Qhs. Wear as tolerated. Do not wear if they cause increased discomfort or lesions.         --  Leg elevation-elevated heart level for 30 minutes 3-4 times per day     --  Physical activity encouraged (daily walking, simple ankle flexion exercises, treadmill and/or cycling for 30 minutes/day 3 times per week)     --Discussed option for venogram to examine for etiology of lower extremity edema worsening

## 2024-02-08 ENCOUNTER — PATIENT MESSAGE (OUTPATIENT)
Dept: INTERVENTIONAL RADIOLOGY/VASCULAR | Age: 39
End: 2024-02-08

## 2024-02-08 DIAGNOSIS — I89.0 LYMPHEDEMA: ICD-10-CM

## 2024-02-08 DIAGNOSIS — R60.0 BILATERAL LOWER EXTREMITY EDEMA: Primary | ICD-10-CM

## 2024-02-08 DIAGNOSIS — I83.893 VARICOSE VEINS OF BOTH LEGS WITH EDEMA: ICD-10-CM

## 2024-02-12 NOTE — TELEPHONE ENCOUNTER
From: Bronwyn Sterling  To: Lupe Conrad  Sent: 2/8/2024 4:46 PM EST  Subject: PT referral     I spoke with both the [facility] PT and my normal PT and neither one do the manual lymphatic drainage. Not sure who to contact or who might do this.

## 2024-02-16 ENCOUNTER — HOSPITAL ENCOUNTER (OUTPATIENT)
Dept: CT IMAGING | Age: 39
End: 2024-02-16
Payer: COMMERCIAL

## 2024-02-16 DIAGNOSIS — R20.0 NUMBNESS AND TINGLING OF BOTH LOWER EXTREMITIES: ICD-10-CM

## 2024-02-16 DIAGNOSIS — R93.6 ABNORMAL ULTRASOUND OF LOWER EXTREMITY: ICD-10-CM

## 2024-02-16 DIAGNOSIS — R20.2 NUMBNESS AND TINGLING OF BOTH LOWER EXTREMITIES: ICD-10-CM

## 2024-02-16 PROCEDURE — 75635 CT ANGIO ABDOMINAL ARTERIES: CPT

## 2024-02-16 PROCEDURE — 75635 CT ANGIO ABDOMINAL ARTERIES: CPT | Performed by: RADIOLOGY

## 2024-02-16 PROCEDURE — 6360000004 HC RX CONTRAST MEDICATION: Performed by: NURSE PRACTITIONER

## 2024-02-16 PROCEDURE — 2580000003 HC RX 258: Performed by: NURSE PRACTITIONER

## 2024-02-16 RX ORDER — SODIUM CHLORIDE 9 MG/ML
INJECTION, SOLUTION INTRAVENOUS ONCE
Status: COMPLETED | OUTPATIENT
Start: 2024-02-16 | End: 2024-02-16

## 2024-02-16 RX ADMIN — IOPAMIDOL 125 ML: 755 INJECTION, SOLUTION INTRAVENOUS at 10:56

## 2024-02-16 RX ADMIN — SODIUM CHLORIDE: 9 INJECTION, SOLUTION INTRAVENOUS at 10:56

## 2024-02-27 ENCOUNTER — TELEMEDICINE (OUTPATIENT)
Dept: NEUROLOGY | Facility: CLINIC | Age: 39
End: 2024-02-27
Payer: MEDICAID

## 2024-02-27 ENCOUNTER — HOSPITAL ENCOUNTER (OUTPATIENT)
Dept: PHYSICAL THERAPY | Age: 39
Setting detail: THERAPIES SERIES
Discharge: HOME | End: 2024-02-27
Payer: COMMERCIAL

## 2024-02-27 DIAGNOSIS — G43.709 CHRONIC MIGRAINE W/O AURA W/O STATUS MIGRAINOSUS, NOT INTRACTABLE: ICD-10-CM

## 2024-02-27 PROCEDURE — 97162 PT EVAL MOD COMPLEX 30 MIN: CPT

## 2024-02-27 PROCEDURE — 99214 OFFICE O/P EST MOD 30 MIN: CPT | Performed by: STUDENT IN AN ORGANIZED HEALTH CARE EDUCATION/TRAINING PROGRAM

## 2024-02-27 RX ORDER — ERENUMAB-AOOE 140 MG/ML
140 INJECTION, SOLUTION SUBCUTANEOUS
Qty: 1 ML | Refills: 6 | Status: SHIPPED | OUTPATIENT
Start: 2024-02-27

## 2024-02-27 ASSESSMENT — PAIN SCALES - GENERAL: PAINLEVEL_OUTOF10: 3

## 2024-02-27 ASSESSMENT — PAIN DESCRIPTION - ORIENTATION: ORIENTATION: RIGHT;LEFT

## 2024-02-27 ASSESSMENT — PAIN DESCRIPTION - LOCATION: LOCATION: LEG

## 2024-02-27 NOTE — THERAPY EVALUATION
[facility]  PHYSICAL THERAPY PLAN OF CARE                                    [facility] [place] [address]     Ph: [phone]  Fax: [phone]    [x] Certification  [] Recertification []  Plan of Care  [] Progress Note [] Discharge      Referring Provider: Lupe Conrad APRN - CNP    From:  Monika Fong PT     Patient: Bronwyn Sterling (39 y.o. female) : 1985 Date: 2024   Medical Diagnosis: Bilateral lower extremity edema [R60.0]  Varicose veins of both legs with edema [I83.893]  Lymphedema [I89.0]    Treatment Diagnosis: Andrei LE lymphedema    Plan of Care/Certification Expiration Date: 24   Progress Report Period from: 2024  to 2024    Visits to Date: 1 No Show: 0 Cancelled Appts: 0    OBJECTIVE:   Long Term Goals - Time Frame for Long Term Goals : 5 weeks  Goals Current/ Discharge status Status   Long Term Goal 1: Pt will be independent with home management of lymphedema symptoms.  ongoing   New   Long Term Goal 2: Reduce andrei LE measures by up to 1-2cm to improve pain and mobility.  No significant sign of edema at the time of eval, pt reports increased edema by end of day   New   Long Term Goal 3: LLIS </= 26 to improve pt's QOL. LTG 3 Current Status:: 24: LLIS = 36   New       Body Structures, Functions, Activity Limitations Requiring Skilled Therapeutic Intervention:  (Mild increased edema andrei LEs)  Assessment: Pt presents with a c/o ongoing edema in andrei LEs she believes began around second back surgery.  Pt demonstrates good overall strength and mobility.  Pt with no increased edema noted during evaluation but pt reports swelling worsens as the day goes on.  Pt with no signs of increased fibrosis or significant skin changes around ankles and feet.  Pt wearing leggings on evaluation and skin unable to be assessed on eval.  Pt would benefit from further skilled PT to educate pt on home management of her lymphedema symptoms.  Therapy Prognosis: Good      PT

## 2024-02-27 NOTE — PROGRESS NOTES
Virtual or Telephone Consent    An interactive audio and video telecommunication system which permits real time communications between the patient (at the originating site) and provider (at the distant site) was utilized to provide this telehealth service.   Verbal consent was requested and obtained from Joceline Hess on this date, 02/27/24 for a telehealth visit.     Subjective   Joceline Hess is a 39 y.o.   female who is being followed for chronic migraine without aura.    Since last seen, patient states that she continues to have 21/30 HA days per month. She is still on Ajovy because there was a plan to change insurance. She is officially on medicaid now. Ubrelvy continues to be effective without side effect. Has had samples of Nurtec in the past that caused significant nausea. Qulipta was not effective in the past.    Current Outpatient Medications   Medication Instructions    Ajovy Autoinjector 225 mg, subcutaneous, Every 28 days    ubrogepant (Ubrelvy) 100 mg tablet tablet TAKE ONE (1) TABLET BY MOUTH AT ONSET OF MIGRAINE.       Assessment/Plan   Patient with chronic migraine without aura. Ajovy was effective initially, however has since had wear off. Per our discussion, will switch to Aimovig at this time. For breakthrough headaches, will continues to use Ubrelvy as it is effective and tolerated well. Aim to avoid Nurtec if possible given adverse side effects.    - discontinue Ajovy  - start Aimovig 140mg monthly  - continue Ubrelvy 100mg PRN  - follow up 4 months

## 2024-02-27 NOTE — PROGRESS NOTES
[facility]  LYMPHEDEMA/CANCER REHAB  INITIAL EVALUATION    [x]  [facility]  []  [facility]  []  [facility]     Physical Therapy: Initial Evaluation    Patient: Bronwyn Sterling (39 y.o.     female)   Examination Date: 2024  Plan of Care Certification Period: 2024 to 24  Progress Note Counter: 1/5-10   :  1985 ;    Confirmed: Yes MRN: [id number]  CSN: [id number]   Insurance: Payor: [employer] / Plan: [employer]ANS / Product Type: *No Product type* /   Insurance ID: [id number] - (Medicare Managed) Secondary Insurance (if applicable): [place]ICAID [place]   Referring Physician: Lupe Conrad APRN - C*     PCP: Debora Garcia DO Visits to Date/Visits Approved:   (bmn)    No Show/Cancelled Appts: 0 / 0     Medical Diagnosis: Bilateral lower extremity edema [R60.0]  Varicose veins of both legs with edema [I83.893]  Lymphedema [I89.0]    Treatment Diagnosis: Mario LE lymphedema     PERTINENT MEDICAL HISTORY           Medical History: Chart Reviewed: Yes   Past Medical History:   Diagnosis Date    Asthma     exercise induced    Migraines      Surgical History:   Past Surgical History:   Procedure Laterality Date    BACK SURGERY      2006    BACK SURGERY  2021    BREAST ENHANCEMENT SURGERY      COLONOSCOPY      prior to , dr chaidez?    COLONOSCOPY N/A 2023    COLONOSCOPY DIAGNOSTIC performed by Evelio Araujo MD at [facility]    COSMETIC SURGERY      breast implants    ENDOSCOPY, COLON, DIAGNOSTIC      prior to , dr hancock?    FINGER SURGERY      age 3    HYSTERECTOMY, VAGINAL N/A 2023    Total Laparoscopic Hysterectomy Bilateral Salpingectomy Left Ovarian Cyst Removal Left Arm Nexplanon Removal [place]use [place] [facility] [place]    LAMINECTOMY Right 2022    RIGHT L4-5 MICRODISKECTOMY performed [place]ine Monique [place] [facility] [place]    LIPOSUCTION      STIMULATOR SURGERY N/A 2023    STAGE 1

## 2024-02-28 ENCOUNTER — SPECIALTY PHARMACY (OUTPATIENT)
Dept: PHARMACY | Facility: CLINIC | Age: 39
End: 2024-02-28

## 2024-02-28 PROCEDURE — RXMED WILLOW AMBULATORY MEDICATION CHARGE

## 2024-03-01 ENCOUNTER — HOSPITAL ENCOUNTER (OUTPATIENT)
Dept: PHYSICAL THERAPY | Age: 39
Setting detail: THERAPIES SERIES
Discharge: HOME | End: 2024-03-01
Payer: COMMERCIAL

## 2024-03-01 ENCOUNTER — PHARMACY VISIT (OUTPATIENT)
Dept: PHARMACY | Facility: CLINIC | Age: 39
End: 2024-03-01
Payer: MEDICARE

## 2024-03-01 NOTE — PROGRESS NOTES
Therapy                            Cancellation/No-show Note    Date: 2024  Patient: Bronwyn Sterling (39 y.o. female)  : 1985  MRN:  [id number]  Referring Physician: Lupe Conrad APRN - CNP    Medical Diagnosis: Bilateral lower extremity edema [R60.0]  Varicose veins of both legs with edema [I83.893]  Lymphedema [I89.0]      Visit Information:  Insurance: Payor: [employer] / Plan: [employer]ANS / Product Type: *No Product type* /   Visits to Date: 1   No Show/Cancelled Appts: 0       For today's appointment patient:  [x]  Cancelled  []  Rescheduled appointment  []  No-show   []  Called pt to remind of next appointment     Reason given by patient:  []  Patient ill  []  Conflicting appointment  []  No transportation    []  Conflict with work  []  No reason given  [x]  Other:  no school for kid    [x] Pt has future appointments scheduled, no follow up needed  [] Pt requests to be on hold.    Reason:   If > 2 weeks please discuss with therapist.  [] Therapist to call pt for follow up     Comments:       Signature: Electronically signed by Kristin Cardenas PT on 3/1/24 at 2:36 PM EST

## 2024-03-04 ENCOUNTER — PHARMACY VISIT (OUTPATIENT)
Dept: PHARMACY | Facility: CLINIC | Age: 39
End: 2024-03-04

## 2024-03-05 ENCOUNTER — HOSPITAL ENCOUNTER (OUTPATIENT)
Dept: PHYSICAL THERAPY | Age: 39
Setting detail: THERAPIES SERIES
Discharge: HOME | End: 2024-03-05
Payer: COMMERCIAL

## 2024-03-05 PROCEDURE — 97140 MANUAL THERAPY 1/> REGIONS: CPT

## 2024-03-05 PROCEDURE — 97110 THERAPEUTIC EXERCISES: CPT

## 2024-03-05 ASSESSMENT — PAIN DESCRIPTION - LOCATION: LOCATION: BACK;NECK

## 2024-03-05 ASSESSMENT — PAIN DESCRIPTION - DESCRIPTORS: DESCRIPTORS: POUNDING

## 2024-03-05 ASSESSMENT — PAIN SCALES - GENERAL: PAINLEVEL_OUTOF10: 3

## 2024-03-05 ASSESSMENT — PAIN DESCRIPTION - ORIENTATION: ORIENTATION: RIGHT;LEFT

## 2024-03-05 NOTE — PROGRESS NOTES
[facility]  Outpatient Physical Therapy    Treatment Note        Date: 3/5/2024  Patient: Bronwyn Sterling  : 1985   Confirmed: Yes  MRN: [id number]  Referring Provider: Lupe Conrad APRN - CNP    Medical Diagnosis: Bilateral lower extremity edema [R60.0]  Varicose veins of both legs with edema [I83.893]  Lymphedema [I89.0]       Treatment Diagnosis: Mario LE lymphedema    Visit Information:  Insurance: Payor: [employer]AN / Plan: [employer] PLANS / Product Type: *No Product type* /   PT Visit Information  PT Insurance Information: [employer]  Total # of Visits Approved:  (bmn)  Total # of Visits to Date: 2  Plan of Care/Certification Expiration Date: 24  No Show: 0  Progress Note Due Date: 24  Canceled Appointment: 1  Progress Note Counter: -10    Subjective Information:  Subjective: Pt states she is starting to get a rash on the Rt foot and dusky toes and they hurt, needs bandaides to stop rubbing which iritates.  HEP Compliance:  [] Good [x] Fair [] Poor [] Reports not doing due to:               Pain Screening  Patient Currently in Pain: Yes  Pain Assessment: 0-10  Pain Level: 3  Pain Location: Back, Neck  Pain Orientation: Right, Left  Pain Descriptors: Pounding    Treatment:  Exercises:  Exercises  Exercise 1: Lymphedema exercises decreased reps based on pain, seated and standing  Exercise 2: Lymphedema massage, pt participation with trunk clearance  Exercise 20: HEP: continue current+ 1st page of massage       Manual:   Manual Therapy  Other: Lymphedema massage x 30'       *Indicates exercise, modality, or manual techniques to be initiated when appropriate    Objective Measures:         LTG 1 Current Status:: 3/5: Initiated Lymphedema massage education     LTG 3 Current Status:: 24: LLIS = 36          Assessment:   Body Structures, Functions, Activity Limitations Requiring Skilled Therapeutic Intervention:  (Mild increased edema mario LEs)  Assessment:

## 2024-03-08 ENCOUNTER — HOSPITAL ENCOUNTER (OUTPATIENT)
Dept: PHYSICAL THERAPY | Age: 39
Setting detail: THERAPIES SERIES
Discharge: HOME | End: 2024-03-08
Payer: COMMERCIAL

## 2024-03-08 PROCEDURE — 97110 THERAPEUTIC EXERCISES: CPT

## 2024-03-08 PROCEDURE — 97140 MANUAL THERAPY 1/> REGIONS: CPT

## 2024-03-08 ASSESSMENT — PAIN DESCRIPTION - LOCATION: LOCATION: HEAD;ABDOMEN

## 2024-03-08 ASSESSMENT — PAIN SCALES - GENERAL: PAINLEVEL_OUTOF10: 6

## 2024-03-08 ASSESSMENT — PAIN DESCRIPTION - ORIENTATION: ORIENTATION: RIGHT;LEFT

## 2024-03-08 ASSESSMENT — PAIN DESCRIPTION - DESCRIPTORS: DESCRIPTORS: SHOOTING

## 2024-03-08 NOTE — PROGRESS NOTES
[facility]  Outpatient Physical Therapy    Treatment Note        Date: 3/8/2024  Patient: Bronwyn Sterling  : 1985   Confirmed: Yes  MRN: [id number]  Referring Provider: Lupe Conrad APRN - CNP    Medical Diagnosis: Bilateral lower extremity edema [R60.0]  Varicose veins of both legs with edema [I83.893]  Lymphedema [I89.0]       Treatment Diagnosis: Mario LE lymphedema    Visit Information:  Insurance: Payor: [employer]AN / Plan: [employer] PLANS / Product Type: *No Product type* /   PT Visit Information  PT Insurance Information: [employer]  Total # of Visits Approved:  (bmn)  Total # of Visits to Date: 2  Plan of Care/Certification Expiration Date: 24  No Show: 0  Progress Note Due Date: 24  Canceled Appointment: 1  Progress Note Counter: -10    Subjective Information:  Subjective: Pt states she hasnt felt good since LV, doesnt know if it was a new med. Had a migraine, GI issues  HEP Compliance:  [x] Good [] Fair [] Poor [] Reports not doing due to:    Pain Screening  Patient Currently in Pain: Yes  Pain Assessment: 0-10  Pain Level: 6  Pain Location: Head, Abdomen  Pain Orientation: Right, Left  Pain Descriptors: Shooting    Treatment:  Exercises:  Exercises  Exercise 2: Lymphedema massage, pt participation with trunk clearance     *Indicates exercise, modality, or manual techniques to be initiated when appropriate    Objective Measures:        Strength: [x] NT  [] MMT completed:         LTG 1 Current Status:: 3/8: Pt given Lymphedema education pkt on do's and dont's     Assessment:   Body Structures, Functions, Activity Limitations Requiring Skilled Therapeutic Intervention:  (Mild increased edema mario LEs)  Assessment: Pt given Complete massage pkt to continue Lymphedema massage until the next appt in 1 week. Pt participated in complete massage with therapist doing RLE and pt doing LLE in a propped up position on mat.VCs for flat palm . Pt expresses

## 2024-03-11 RX ORDER — FAMOTIDINE 20 MG/1
20 TABLET, FILM COATED ORAL 2 TIMES DAILY PRN
Qty: 60 TABLET | Refills: 3 | Status: SHIPPED | OUTPATIENT
Start: 2024-03-11

## 2024-03-12 ENCOUNTER — APPOINTMENT (OUTPATIENT)
Dept: PHYSICAL THERAPY | Age: 39
End: 2024-03-12
Payer: COMMERCIAL

## 2024-03-15 ENCOUNTER — HOSPITAL ENCOUNTER (OUTPATIENT)
Dept: PHYSICAL THERAPY | Age: 39
Setting detail: THERAPIES SERIES
Discharge: HOME | End: 2024-03-15
Payer: COMMERCIAL

## 2024-03-15 PROCEDURE — 97140 MANUAL THERAPY 1/> REGIONS: CPT

## 2024-03-15 PROCEDURE — 97110 THERAPEUTIC EXERCISES: CPT

## 2024-03-15 ASSESSMENT — PAIN SCALES - GENERAL: PAINLEVEL_OUTOF10: 6

## 2024-03-15 ASSESSMENT — PAIN DESCRIPTION - LOCATION: LOCATION: GENERALIZED

## 2024-03-15 ASSESSMENT — PAIN DESCRIPTION - DESCRIPTORS: DESCRIPTORS: SORE

## 2024-03-15 NOTE — PROGRESS NOTES
PT Individual Minutes  Time In: 1007  Time Out: 1100  Minutes: 53  Timed Code Treatment Minutes: 53 Minutes  Procedure Minutes:0 minutes  Timed Activity Minutes Units   Ther Ex 18 1   Manual  35 3     Electronically signed by Lindsay Rivera, PT on 3/15/24 at 10:08 AM EDT

## 2024-03-20 ENCOUNTER — HOSPITAL ENCOUNTER (OUTPATIENT)
Dept: PHYSICAL THERAPY | Age: 39
Setting detail: THERAPIES SERIES
Discharge: HOME | End: 2024-03-20
Payer: COMMERCIAL

## 2024-03-20 PROCEDURE — 97110 THERAPEUTIC EXERCISES: CPT

## 2024-03-20 ASSESSMENT — PAIN SCALES - GENERAL: PAINLEVEL_OUTOF10: 7

## 2024-03-20 ASSESSMENT — PAIN DESCRIPTION - LOCATION: LOCATION: GENERALIZED

## 2024-03-20 NOTE — PROGRESS NOTES
Treatment Recommendations: Strengthening, Balance training, Functional mobility training, Gait training, Neuromuscular re-education, Manual, Manual lymphatic drainage, Home exercise program, Patient/Caregiver education & training, Equipment evaluation, education, & procurement, Modalities  Modalities: Vasopneumatic Device                        Patient Status:[] Continue/ Initiate plan of Care     [] Discharge PT.  Recommend pt continue with HEP.      [] Additional visits requested, Please re-certify for additional visits:     [x] Hold   Obj info by:  Electronically signed by Nancie Dial PTA on 3/20/2024 at 10:25 AM    Signature:           If you have any questions or concerns, please don't hesitate to call.  Thank you for your referral.    I have reviewed this plan of care and certify a need for medically necessary rehabilitation services.    Physician Signature:__________________________________________________________  Date:  Please sign and return

## 2024-03-20 NOTE — PROGRESS NOTES
order. Patient reporting symptoms throughout such as dizziness while completing upper half of massage though symptoms would not be expected while completing. Patient feels confident on completing HEP Independently at this time, will place to trial on own and will F/U if needed.  Treatment Diagnosis: Mario LE lymphedema             Post-Pain Assessment:       Pain Rating (0-10 pain scale):   5/10   Location and pain description same as pre-treatment unless indicated.   Action: [] NA   [] Perform HEP  [x] Meds as prescribed  [] Modalities as prescribed   [] Call Physician     GOALS   Patient Goal(s): Patient Goals : Decrease swelling in legs    Long Term Goals Completed by 5 weeks Goal Status   LTG 1 Pt will be independent with home management of lymphedema symptoms. In progress   LTG 2 Reduce mario LE measures by up to 1-2cm to improve pain and mobility. In progress   LTG 3 LLIS </= 26 to improve pt's QOL. In progress       Plan:  Frequency/Duration:  Plan  Plan Frequency: 1-2  Plan weeks: 5  Current Treatment Recommendations: Strengthening, Balance training, Functional mobility training, Gait training, Neuromuscular re-education, Manual, Manual lymphatic drainage, Home exercise program, Patient/Caregiver education & training, Equipment evaluation, education, & procurement, Modalities  Modalities: Vasopneumatic Device  Pt to continue current HEP.  See objective section for any therapeutic exercise changes, additions or modifications this date.    Therapy Time:      PT Individual Minutes  Time In: 0930  Time Out: 1016  Minutes: 46  Timed Code Treatment Minutes: 46 Minutes  Procedure Minutes:0  Timed Activity Minutes Units   Ther Ex 46 3     Electronically signed by Nancie Dial PTA on 3/20/24 at 9:24 AM EDT

## 2024-03-25 ENCOUNTER — TELEMEDICINE (OUTPATIENT)
Dept: NEUROLOGY | Facility: CLINIC | Age: 39
End: 2024-03-25
Payer: COMMERCIAL

## 2024-03-25 DIAGNOSIS — G43.709 CHRONIC MIGRAINE W/O AURA W/O STATUS MIGRAINOSUS, NOT INTRACTABLE: Primary | ICD-10-CM

## 2024-03-25 PROCEDURE — 99213 OFFICE O/P EST LOW 20 MIN: CPT | Performed by: STUDENT IN AN ORGANIZED HEALTH CARE EDUCATION/TRAINING PROGRAM

## 2024-03-25 NOTE — PROGRESS NOTES
Virtual or Telephone Consent    An interactive audio and video telecommunication system which permits real time communications between the patient (at the originating site) and provider (at the distant site) was utilized to provide this telehealth service.   Verbal consent was requested and obtained from Joceline Hess on this date, 03/25/24 for a telehealth visit.     Subjective   Joceline Hess is a 39 y.o.   female who is being followed for chronic migraine without aura.     Since last seen, patient states that had an allergic reaction to the Aimovig of hives. Denies any history of latex allergy. Notes that she had some red splotches on her forehead that seemed to spread all over her body. Not sure if the Aimovig helped. Recent weather changes have triggered headache and body pains. Has plans to see rheumatology in [place] in the near future given concern for lupus.      Current Outpatient Medications   Medication Instructions    erenumab (Aimovig Autoinjector) 140 mg/mL injection Inject 1 pen (140 mg) under the skin every 28 (twenty-eight) days.    ubrogepant (Ubrelvy) 100 mg tablet tablet TAKE ONE (1) TABLET BY MOUTH AT ONSET OF MIGRAINE.       Assessment/Plan   Patient with chronic migraine without aura. Was on aimovig which may have been helping however developed rash. Unclear if rash is truly related to Aimovig as it may have started prior to injections and has been worsening over the last several weeks. Has appointment with PCP in the near future. It is possible that an underlying rheumatologic process may be triggering headaches but unclear. Consideration of Vyepti vs Botox, however per our discussion, patient opting to hold off on starting a new treatment until evaluated by rheum.    - follow up in 3 months    I personally spent 22 minutes today, exclusive of procedures, providing care for this patient, including preparation, face to face time, documentation and other services such as review of  medical records, diagnostic result, patient education, counseling, coordination of care as specified in the encounter.

## 2024-03-28 ENCOUNTER — SPECIALTY PHARMACY (OUTPATIENT)
Dept: PHARMACY | Facility: CLINIC | Age: 39
End: 2024-03-28

## 2024-04-01 ENCOUNTER — OFFICE VISIT (OUTPATIENT)
Dept: FAMILY MEDICINE CLINIC | Age: 39
End: 2024-04-01
Payer: COMMERCIAL

## 2024-04-01 VITALS
DIASTOLIC BLOOD PRESSURE: 68 MMHG | OXYGEN SATURATION: 96 % | HEART RATE: 94 BPM | TEMPERATURE: 98.6 F | WEIGHT: 145 LBS | BODY MASS INDEX: 25.69 KG/M2 | SYSTOLIC BLOOD PRESSURE: 104 MMHG | HEIGHT: 63 IN

## 2024-04-01 DIAGNOSIS — L30.9 DERMATITIS: ICD-10-CM

## 2024-04-01 DIAGNOSIS — R23.2 HOT FLASHES: ICD-10-CM

## 2024-04-01 DIAGNOSIS — I73.00 RAYNAUD'S DISEASE WITHOUT GANGRENE: ICD-10-CM

## 2024-04-01 DIAGNOSIS — R61 EXCESSIVE SWEATING: Primary | ICD-10-CM

## 2024-04-01 DIAGNOSIS — R61 EXCESSIVE SWEATING: ICD-10-CM

## 2024-04-01 DIAGNOSIS — R76.8 POSITIVE ANA (ANTINUCLEAR ANTIBODY): ICD-10-CM

## 2024-04-01 DIAGNOSIS — K21.00 GASTROESOPHAGEAL REFLUX DISEASE WITH ESOPHAGITIS WITHOUT HEMORRHAGE: ICD-10-CM

## 2024-04-01 DIAGNOSIS — M54.16 LUMBAR RADICULOPATHY: ICD-10-CM

## 2024-04-01 DIAGNOSIS — Z88.9 MULTIPLE ALLERGIES: ICD-10-CM

## 2024-04-01 LAB
ALBUMIN SERPL-MCNC: 4.5 G/DL (ref 3.5–4.6)
ALP SERPL-CCNC: 69 U/L (ref 40–130)
ALT SERPL-CCNC: 15 U/L (ref 0–33)
ANION GAP SERPL CALCULATED.3IONS-SCNC: 11 MEQ/L (ref 9–15)
AST SERPL-CCNC: 18 U/L (ref 0–35)
BASOPHILS # BLD: 0.1 K/UL (ref 0–0.2)
BASOPHILS NFR BLD: 0.7 %
BILIRUB SERPL-MCNC: 0.3 MG/DL (ref 0.2–0.7)
BUN SERPL-MCNC: 8 MG/DL (ref 6–20)
CALCIUM SERPL-MCNC: 9.3 MG/DL (ref 8.5–9.9)
CHLORIDE SERPL-SCNC: 105 MEQ/L (ref 95–107)
CO2 SERPL-SCNC: 22 MEQ/L (ref 20–31)
CREAT SERPL-MCNC: 0.57 MG/DL (ref 0.5–0.9)
EOSINOPHIL # BLD: 0 K/UL (ref 0–0.7)
EOSINOPHIL NFR BLD: 0.6 %
ERYTHROCYTE [DISTWIDTH] IN BLOOD BY AUTOMATED COUNT: 13.5 % (ref 11.5–14.5)
GLOBULIN SER CALC-MCNC: 2.8 G/DL (ref 2.3–3.5)
GLUCOSE SERPL-MCNC: 93 MG/DL (ref 70–99)
HCT VFR BLD AUTO: 42.8 % (ref 37–47)
HGB BLD-MCNC: 13.9 G/DL (ref 12–16)
LYMPHOCYTES # BLD: 2.5 K/UL (ref 1–4.8)
LYMPHOCYTES NFR BLD: 35.3 %
MCH RBC QN AUTO: 29.8 PG (ref 27–31.3)
MCHC RBC AUTO-ENTMCNC: 32.5 % (ref 33–37)
MCV RBC AUTO: 91.6 FL (ref 79.4–94.8)
MONOCYTES # BLD: 0.5 K/UL (ref 0.2–0.8)
MONOCYTES NFR BLD: 7.8 %
NEUTROPHILS # BLD: 3.9 K/UL (ref 1.4–6.5)
NEUTS SEG NFR BLD: 55.3 %
PLATELET # BLD AUTO: 390 K/UL (ref 130–400)
POTASSIUM SERPL-SCNC: 4.5 MEQ/L (ref 3.4–4.9)
PROT SERPL-MCNC: 7.3 G/DL (ref 6.3–8)
RBC # BLD AUTO: 4.67 M/UL (ref 4.2–5.4)
SODIUM SERPL-SCNC: 138 MEQ/L (ref 135–144)
TSH REFLEX: 0.73 UIU/ML (ref 0.44–3.86)
WBC # BLD AUTO: 7 K/UL (ref 4.8–10.8)

## 2024-04-01 PROCEDURE — 99214 OFFICE O/P EST MOD 30 MIN: CPT | Performed by: STUDENT IN AN ORGANIZED HEALTH CARE EDUCATION/TRAINING PROGRAM

## 2024-04-01 RX ORDER — POLYETHYLENE GLYCOL 3350 17 G/17G
17 POWDER, FOR SOLUTION ORAL DAILY
COMMUNITY
End: 2024-04-01 | Stop reason: SDUPTHER

## 2024-04-01 RX ORDER — CLOBETASOL PROPIONATE 0.5 MG/G
CREAM TOPICAL
Qty: 60 G | Refills: 1 | Status: SHIPPED | OUTPATIENT
Start: 2024-04-01

## 2024-04-01 RX ORDER — FAMOTIDINE 20 MG/1
20 TABLET, FILM COATED ORAL 2 TIMES DAILY PRN
Qty: 180 TABLET | Refills: 1 | Status: SHIPPED | OUTPATIENT
Start: 2024-04-01

## 2024-04-01 RX ORDER — LANSOPRAZOLE 30 MG/1
30 CAPSULE, DELAYED RELEASE ORAL DAILY
Qty: 90 CAPSULE | Refills: 1 | Status: SHIPPED | OUTPATIENT
Start: 2024-04-01

## 2024-04-01 RX ORDER — POLYETHYLENE GLYCOL 3350 17 G/17G
17 POWDER, FOR SOLUTION ORAL DAILY
Qty: 3 EACH | Refills: 1 | Status: SHIPPED | OUTPATIENT
Start: 2024-04-01

## 2024-04-01 RX ORDER — IBUPROFEN 800 MG/1
800 TABLET ORAL EVERY 8 HOURS PRN
Qty: 90 TABLET | Refills: 2 | Status: SHIPPED | OUTPATIENT
Start: 2024-04-01 | End: 2024-05-01

## 2024-04-01 RX ORDER — ONDANSETRON 4 MG/1
4 TABLET, FILM COATED ORAL EVERY 6 HOURS PRN
Qty: 30 TABLET | Refills: 2 | Status: SHIPPED | OUTPATIENT
Start: 2024-04-01

## 2024-04-01 RX ORDER — LISDEXAMFETAMINE DIMESYLATE 70 MG/1
70 CAPSULE ORAL EVERY MORNING
COMMUNITY
Start: 2024-03-02

## 2024-04-01 SDOH — ECONOMIC STABILITY: INCOME INSECURITY: HOW HARD IS IT FOR YOU TO PAY FOR THE VERY BASICS LIKE FOOD, HOUSING, MEDICAL CARE, AND HEATING?: NOT VERY HARD

## 2024-04-01 SDOH — ECONOMIC STABILITY: FOOD INSECURITY: WITHIN THE PAST 12 MONTHS, YOU WORRIED THAT YOUR FOOD WOULD RUN OUT BEFORE YOU GOT MONEY TO BUY MORE.: NEVER TRUE

## 2024-04-01 SDOH — ECONOMIC STABILITY: FOOD INSECURITY: WITHIN THE PAST 12 MONTHS, THE FOOD YOU BOUGHT JUST DIDN'T LAST AND YOU DIDN'T HAVE MONEY TO GET MORE.: NEVER TRUE

## 2024-04-01 ASSESSMENT — PATIENT HEALTH QUESTIONNAIRE - PHQ9
3. TROUBLE FALLING OR STAYING ASLEEP: NEARLY EVERY DAY
1. LITTLE INTEREST OR PLEASURE IN DOING THINGS: NOT AT ALL
8. MOVING OR SPEAKING SO SLOWLY THAT OTHER PEOPLE COULD HAVE NOTICED. OR THE OPPOSITE, BEING SO FIGETY OR RESTLESS THAT YOU HAVE BEEN MOVING AROUND A LOT MORE THAN USUAL: NOT AT ALL
SUM OF ALL RESPONSES TO PHQ QUESTIONS 1-9: 8
SUM OF ALL RESPONSES TO PHQ QUESTIONS 1-9: 8
9. THOUGHTS THAT YOU WOULD BE BETTER OFF DEAD, OR OF HURTING YOURSELF: NOT AT ALL
2. FEELING DOWN, DEPRESSED OR HOPELESS: NOT AT ALL
SUM OF ALL RESPONSES TO PHQ9 QUESTIONS 1 & 2: 0
SUM OF ALL RESPONSES TO PHQ QUESTIONS 1-9: 8
SUM OF ALL RESPONSES TO PHQ QUESTIONS 1-9: 8
4. FEELING TIRED OR HAVING LITTLE ENERGY: MORE THAN HALF THE DAYS
10. IF YOU CHECKED OFF ANY PROBLEMS, HOW DIFFICULT HAVE THESE PROBLEMS MADE IT FOR YOU TO DO YOUR WORK, TAKE CARE OF THINGS AT HOME, OR GET ALONG WITH OTHER PEOPLE: SOMEWHAT DIFFICULT
5. POOR APPETITE OR OVEREATING: MORE THAN HALF THE DAYS
7. TROUBLE CONCENTRATING ON THINGS, SUCH AS READING THE NEWSPAPER OR WATCHING TELEVISION: SEVERAL DAYS
6. FEELING BAD ABOUT YOURSELF - OR THAT YOU ARE A FAILURE OR HAVE LET YOURSELF OR YOUR FAMILY DOWN: NOT AT ALL

## 2024-04-01 NOTE — PROGRESS NOTES
chronic.  Continue ibuprofen as needed.  Referral to massage therapy placed.  Follow-up as scheduled.  - ibuprofen (ADVIL;MOTRIN) 800 MG tablet; Take 1 tablet by mouth every 8 hours as needed for Pain  Dispense: 90 tablet; Refill: 2  - External Referral To Massage Therapy    6. Hot flashes  As above.  Check labs as noted.  Call with result  - CBC with Auto Differential; Future  - TSH with Reflex; Future  - Follicle Stimulating Hormone; Future  - Estrogens, Fractionated; Future  - Comprehensive Metabolic Panel; Future    7. Positive DAVID (antinuclear antibody)  Patient with history of positive DAVID and lupus.  Referral to rheumatology placed.  Check labs.  Continue to monitor.  Follow-up as scheduled  - External Referral to Rheumatology  - TSH with Reflex; Future    8. Raynaud's disease without gangrene  As above  - External Referral to Rheumatology  - TSH with Reflex; Future    Orders Placed This Encounter   Procedures    CBC with Auto Differential     Standing Status:   Future     Number of Occurrences:   1     Standing Expiration Date:   4/1/2025    TSH with Reflex     Standing Status:   Future     Number of Occurrences:   1     Standing Expiration Date:   4/1/2025    Follicle Stimulating Hormone     Standing Status:   Future     Number of Occurrences:   1     Standing Expiration Date:   4/1/2025    Estrogens, Fractionated     Standing Status:   Future     Number of Occurrences:   1     Standing Expiration Date:   4/1/2025    Comprehensive Metabolic Panel     Standing Status:   Future     Number of Occurrences:   1     Standing Expiration Date:   4/1/2025    External Referral to Allergy     Referral Priority:   Routine     Referral Type:   Eval and Treat     Referral Reason:   Specialty Services Required     Requested Specialty:   Allergy and Immunology     Number of Visits Requested:   1    External Referral to Rheumatology     Referral Priority:   Routine     Referral Type:   Eval and Treat     Referral Reason:

## 2024-04-02 LAB — FOLLICLE STIMULATING HORMONE: 2.4 MIU/ML

## 2024-04-03 ASSESSMENT — ENCOUNTER SYMPTOMS
ABDOMINAL PAIN: 0
VOMITING: 0
BACK PAIN: 1
NAUSEA: 0
SORE THROAT: 0
RHINORRHEA: 0
EYE DISCHARGE: 0
DIARRHEA: 0
COUGH: 0
WHEEZING: 0
SHORTNESS OF BREATH: 0

## 2024-04-04 ENCOUNTER — SPECIALTY PHARMACY (OUTPATIENT)
Dept: PHARMACY | Facility: CLINIC | Age: 39
End: 2024-04-04

## 2024-04-04 LAB
ESTRADIOL SERPL HS-MCNC: 93 PG/ML
ESTROGEN SERPL CALC-MCNC: 133 PG/ML
ESTRONE SERPL-MCNC: 40 PG/ML

## 2024-04-09 NOTE — RESULT ENCOUNTER NOTE
Please inform patient that lab work was all normal.  Hormone levels do not indicate menopause.  All other lab work was normal.  Follow-up as scheduled.  If symptoms worsen or change, return to care sooner.

## 2024-04-11 ENCOUNTER — SPECIALTY PHARMACY (OUTPATIENT)
Dept: PHARMACY | Facility: CLINIC | Age: 39
End: 2024-04-11

## 2024-04-15 ENCOUNTER — TELEPHONE (OUTPATIENT)
Dept: FAMILY MEDICINE CLINIC | Age: 39
End: 2024-04-15

## 2024-04-15 NOTE — TELEPHONE ENCOUNTER
Mary from Dr. Cornelius's office called and is asking that if last office notes and lab results can be faxed to them?    She stated she has an appointment scheduled tomorrow (4-16)

## 2024-04-17 NOTE — TELEPHONE ENCOUNTER
Mary from Dr. Cornelius's office called again and said, they have not received the faxed paperwork. If you can please re-fax it when you get a chance to [phone].

## 2024-04-19 ENCOUNTER — OFFICE VISIT (OUTPATIENT)
Dept: INTERVENTIONAL RADIOLOGY/VASCULAR | Age: 39
End: 2024-04-19
Payer: COMMERCIAL

## 2024-04-19 VITALS
HEART RATE: 107 BPM | OXYGEN SATURATION: 99 % | SYSTOLIC BLOOD PRESSURE: 117 MMHG | BODY MASS INDEX: 25.69 KG/M2 | DIASTOLIC BLOOD PRESSURE: 79 MMHG | WEIGHT: 145 LBS

## 2024-04-19 DIAGNOSIS — R60.0 BILATERAL LOWER EXTREMITY EDEMA: ICD-10-CM

## 2024-04-19 DIAGNOSIS — I83.893 VARICOSE VEINS OF BOTH LEGS WITH EDEMA: ICD-10-CM

## 2024-04-19 DIAGNOSIS — R93.6 ABNORMAL ULTRASOUND OF LOWER EXTREMITY: ICD-10-CM

## 2024-04-19 DIAGNOSIS — R20.0 NUMBNESS AND TINGLING OF BOTH LOWER EXTREMITIES: Primary | ICD-10-CM

## 2024-04-19 DIAGNOSIS — R20.2 NUMBNESS AND TINGLING OF BOTH LOWER EXTREMITIES: Primary | ICD-10-CM

## 2024-04-19 DIAGNOSIS — I89.0 LYMPHEDEMA: ICD-10-CM

## 2024-04-19 PROCEDURE — 4004F PT TOBACCO SCREEN RCVD TLK: CPT | Performed by: NURSE PRACTITIONER

## 2024-04-19 PROCEDURE — G8427 DOCREV CUR MEDS BY ELIG CLIN: HCPCS | Performed by: NURSE PRACTITIONER

## 2024-04-19 PROCEDURE — G8417 CALC BMI ABV UP PARAM F/U: HCPCS | Performed by: NURSE PRACTITIONER

## 2024-04-19 PROCEDURE — 99213 OFFICE O/P EST LOW 20 MIN: CPT | Performed by: NURSE PRACTITIONER

## 2024-04-19 NOTE — PROGRESS NOTES
treadmill and/or cycling for 30 minutes/day 3 times per week)      --Discussed option for venogram to examine for etiology of lower extremity edema worsening towards the end of the day. However, given her edema is mild/minimal on exam, option for conservative treatment (continued weight loss, compression stockings, trial of MLD therapy) discussed as well.  With shared decision making, patient opted for more conservative treatment.      --Discussed side effects experienced at physical therapy, likely related to MLD therapy.  However, given they occur each time she has therapy would not recommend she continue with MLD therapy at this time.  Instructed patient to trial home lower extremity exercises at this time and see if symptoms recur.       2. Numbness and tingling of both lower extremities  3. Abnormal ultrasound of lower extremity  -CTA without evidence of PAD.  Incidental finding of possible fatty liver disease.  Discussed with patient recommendation to follow-up with PCP regarding these findings.  She is agreeable reports understanding    Return if symptoms worsen or fail to improve.    PARKER Smalls - CNP   Staff Vascular Medicine and Interventional Radiology Nurse Practitioner  [facility]    Please note this report has been partially produced using speech recognition software and contain errors related to that system including grammar, punctuation, spelling, and words/phrases that may seem inappropriate. If there are questions or concerns please feel free to contact me to clarify.

## 2024-05-03 PROCEDURE — RXMED WILLOW AMBULATORY MEDICATION CHARGE

## 2024-05-07 ENCOUNTER — PHARMACY VISIT (OUTPATIENT)
Dept: PHARMACY | Facility: CLINIC | Age: 39
End: 2024-05-07
Payer: MEDICARE

## 2024-06-25 ENCOUNTER — APPOINTMENT (OUTPATIENT)
Dept: NEUROLOGY | Facility: CLINIC | Age: 39
End: 2024-06-25
Payer: COMMERCIAL

## 2024-06-25 DIAGNOSIS — G43.709 CHRONIC MIGRAINE W/O AURA W/O STATUS MIGRAINOSUS, NOT INTRACTABLE: ICD-10-CM

## 2024-06-25 PROCEDURE — 99214 OFFICE O/P EST MOD 30 MIN: CPT | Performed by: STUDENT IN AN ORGANIZED HEALTH CARE EDUCATION/TRAINING PROGRAM

## 2024-06-25 NOTE — PROGRESS NOTES
Virtual or Telephone Consent    An interactive audio and video telecommunication system which permits real time communications between the patient (at the originating site) and provider (at the distant site) was utilized to provide this telehealth service.   Verbal consent was requested and obtained from Joceline Hess on this date, 06/25/24 for a telehealth visit.     Subjective   Joceline Hess is a 39 y.o.   female who is being followed for chronic migraine without aura.    Since last seen, patient states that headaches continues to be 30/30 HA days per month.     Was seen by rheumatology and diagnosed with fibromyalgia. He recommended physical therapy. Also found to have IBS. Has a sensitivity to physical touch. Confirmed Raynauds phenomena. Ubrelvy effectively relieves more severe headaches but takes several hours to do so.    Current Outpatient Medications   Medication Instructions    erenumab (Aimovig Autoinjector) 140 mg/mL injection Inject 1 pen (140 mg) under the skin every 28 (twenty-eight) days.    ubrogepant (Ubrelvy) 100 mg tablet tablet TAKE ONE (1) TABLET BY MOUTH AT ONSET OF MIGRAINE.       Assessment/Plan   Patient being followed for suspected chronic migraine without aura. Diagnosed with fibromyalgia by rheumatology, which may be etiology of body pain in addition to headaches. Per our discussion, will hold off on starting any additional headache treatments at this time as she would like to focus on treatment of fibromyalgia. Continue Ubrelvy as needed, refills placed with subsequent refills per PCP.    - follow up PRN    I personally spent 30 minutes today, exclusive of procedures, providing care for this patient, including preparation, face to face time, documentation and other services such as review of medical records, diagnostic result, patient education, counseling, coordination of care as specified in the encounter.

## 2024-06-28 DIAGNOSIS — G43.709 CHRONIC MIGRAINE W/O AURA W/O STATUS MIGRAINOSUS, NOT INTRACTABLE: ICD-10-CM

## 2024-06-30 DIAGNOSIS — K21.00 GASTROESOPHAGEAL REFLUX DISEASE WITH ESOPHAGITIS WITHOUT HEMORRHAGE: ICD-10-CM

## 2024-07-01 ENCOUNTER — OFFICE VISIT (OUTPATIENT)
Dept: FAMILY MEDICINE CLINIC | Age: 39
End: 2024-07-01
Payer: COMMERCIAL

## 2024-07-01 VITALS
WEIGHT: 138 LBS | HEIGHT: 63 IN | DIASTOLIC BLOOD PRESSURE: 60 MMHG | TEMPERATURE: 98.8 F | BODY MASS INDEX: 24.45 KG/M2 | OXYGEN SATURATION: 100 % | HEART RATE: 97 BPM | SYSTOLIC BLOOD PRESSURE: 110 MMHG

## 2024-07-01 VITALS
DIASTOLIC BLOOD PRESSURE: 60 MMHG | BODY MASS INDEX: 24.45 KG/M2 | SYSTOLIC BLOOD PRESSURE: 110 MMHG | HEIGHT: 63 IN | HEART RATE: 97 BPM | WEIGHT: 138 LBS | OXYGEN SATURATION: 100 % | TEMPERATURE: 98.8 F

## 2024-07-01 DIAGNOSIS — K21.00 GASTROESOPHAGEAL REFLUX DISEASE WITH ESOPHAGITIS WITHOUT HEMORRHAGE: ICD-10-CM

## 2024-07-01 DIAGNOSIS — M79.7 FIBROMYALGIA: Primary | ICD-10-CM

## 2024-07-01 DIAGNOSIS — R61 EXCESSIVE SWEATING: ICD-10-CM

## 2024-07-01 DIAGNOSIS — Z00.00 INITIAL MEDICARE ANNUAL WELLNESS VISIT: Primary | ICD-10-CM

## 2024-07-01 PROCEDURE — 99214 OFFICE O/P EST MOD 30 MIN: CPT | Performed by: STUDENT IN AN ORGANIZED HEALTH CARE EDUCATION/TRAINING PROGRAM

## 2024-07-01 PROCEDURE — G0438 PPPS, INITIAL VISIT: HCPCS | Performed by: STUDENT IN AN ORGANIZED HEALTH CARE EDUCATION/TRAINING PROGRAM

## 2024-07-01 RX ORDER — KETOCONAZOLE 20 MG/ML
SHAMPOO TOPICAL
COMMUNITY
Start: 2024-06-30

## 2024-07-01 RX ORDER — CLINDAMYCIN PHOSPHATE 10 MG/G
GEL TOPICAL
COMMUNITY
Start: 2024-06-30

## 2024-07-01 RX ORDER — ONDANSETRON 4 MG/1
TABLET, FILM COATED ORAL
Qty: 30 TABLET | Refills: 2 | Status: SHIPPED | OUTPATIENT
Start: 2024-07-01

## 2024-07-01 RX ORDER — TRIAMCINOLONE ACETONIDE 1 MG/G
CREAM TOPICAL
COMMUNITY
Start: 2024-06-30

## 2024-07-01 ASSESSMENT — PATIENT HEALTH QUESTIONNAIRE - PHQ9
SUM OF ALL RESPONSES TO PHQ QUESTIONS 1-9: 9
8. MOVING OR SPEAKING SO SLOWLY THAT OTHER PEOPLE COULD HAVE NOTICED. OR THE OPPOSITE, BEING SO FIGETY OR RESTLESS THAT YOU HAVE BEEN MOVING AROUND A LOT MORE THAN USUAL: NOT AT ALL
SUM OF ALL RESPONSES TO PHQ QUESTIONS 1-9: 9
1. LITTLE INTEREST OR PLEASURE IN DOING THINGS: NOT AT ALL
SUM OF ALL RESPONSES TO PHQ QUESTIONS 1-9: 9
SUM OF ALL RESPONSES TO PHQ9 QUESTIONS 1 & 2: 0
SUM OF ALL RESPONSES TO PHQ QUESTIONS 1-9: 9
2. FEELING DOWN, DEPRESSED OR HOPELESS: NOT AT ALL
10. IF YOU CHECKED OFF ANY PROBLEMS, HOW DIFFICULT HAVE THESE PROBLEMS MADE IT FOR YOU TO DO YOUR WORK, TAKE CARE OF THINGS AT HOME, OR GET ALONG WITH OTHER PEOPLE: SOMEWHAT DIFFICULT
4. FEELING TIRED OR HAVING LITTLE ENERGY: NEARLY EVERY DAY
9. THOUGHTS THAT YOU WOULD BE BETTER OFF DEAD, OR OF HURTING YOURSELF: NOT AT ALL
5. POOR APPETITE OR OVEREATING: NOT AT ALL
3. TROUBLE FALLING OR STAYING ASLEEP: NEARLY EVERY DAY
7. TROUBLE CONCENTRATING ON THINGS, SUCH AS READING THE NEWSPAPER OR WATCHING TELEVISION: NEARLY EVERY DAY
6. FEELING BAD ABOUT YOURSELF - OR THAT YOU ARE A FAILURE OR HAVE LET YOURSELF OR YOUR FAMILY DOWN: NOT AT ALL

## 2024-07-01 ASSESSMENT — ENCOUNTER SYMPTOMS
ABDOMINAL PAIN: 0
DIARRHEA: 0
SORE THROAT: 0
RHINORRHEA: 0
VOMITING: 0
WHEEZING: 0
EYE DISCHARGE: 0
COUGH: 0
NAUSEA: 0
SHORTNESS OF BREATH: 0

## 2024-07-01 ASSESSMENT — LIFESTYLE VARIABLES
HOW MANY STANDARD DRINKS CONTAINING ALCOHOL DO YOU HAVE ON A TYPICAL DAY: PATIENT DOES NOT DRINK
HOW OFTEN DO YOU HAVE A DRINK CONTAINING ALCOHOL: NEVER

## 2024-07-01 NOTE — PATIENT INSTRUCTIONS
Sweating.     Shortness of breath.     Pain, pressure, or a strange feeling in the back, neck, jaw, or upper belly or in one or both shoulders or arms.     Lightheadedness or sudden weakness.     A fast or irregular heartbeat.   After you call 911, the  may tell you to chew 1 adult-strength or 2 to 4 low-dose aspirin. Wait for an ambulance. Do not try to drive yourself.  Watch closely for changes in your health, and be sure to contact your doctor if you have any problems.  Where can you learn more?  Go to https://www.[employer].net/patientEd and enter F075 to learn more about \"A Healthy Heart: Care Instructions.\"  Current as of: June 24, 2023  Content Version: 14.1  © 1654-6729 [employer].   Care instructions adapted under license by [employer]. If you have questions about a medical condition or this instruction, always ask your healthcare professional. [employer] disclaims any warranty or liability for your use of this information.      Personalized Preventive Plan for Bronwyn Sterling - 7/1/2024  Medicare offers a range of preventive health benefits. Some of the tests and screenings are paid in full while other may be subject to a deductible, co-insurance, and/or copay.    Some of these benefits include a comprehensive review of your medical history including lifestyle, illnesses that may run in your family, and various assessments and screenings as appropriate.    After reviewing your medical record and screening and assessments performed today your provider may have ordered immunizations, labs, imaging, and/or referrals for you.  A list of these orders (if applicable) as well as your Preventive Care list are included within your After Visit Summary for your review.    Other Preventive Recommendations:    A preventive eye exam performed by an eye specialist is recommended every 1-2 years to screen for glaucoma; cataracts, macular degeneration, and other eye disorders.  A

## 2024-07-01 NOTE — PROGRESS NOTES
Subjective  Bronwyn Sterling, 39 y.o. female presents today with:  Chief Complaint   Patient presents with    Follow-up    Gastroesophageal Reflux     Has improved. Does occasionally have some break through issues.     Sweats     Has improved, but does still have some issues with sweating.     Other     Did see Rheumatologist & would like to discuss his findings & suggestions.        Patient with 3-month follow-up appointment.    Patient recently saw rheumatology.  She was diagnosed with fibromyalgia.  Their recommendations were physical therapy and dietitian.  They also discussed the medications but they wanted her to start with this first.  She does need referrals for both of these.  She has a prior relationship with rehab consultants.  She was recommended to do both formal PT and aquatic PT.  She is not sure who she would like to see for dietitian.  She states that she would just like a general referral.  This will be provided.  She states that it is comforting knowing what is going on with her at this point.  She states she continues to have pain.    Patient also with GERD.  She is on famotidine 20 mg twice daily as needed as well as lansoprazole 30 mg daily.  Tolerating well.  Symptoms under good control.  She still does have some breakthrough but overall improved.    Patient following up on excessive sweating.  Drysol does seem to be helping.  She states it is mostly improved but still has some breakthrough.  No side effects from the medication.  She has no other concerns at this time.      Review of Systems   Constitutional:  Negative for chills, fatigue, fever and unexpected weight change.   HENT:  Negative for congestion, rhinorrhea and sore throat.    Eyes:  Negative for discharge.   Respiratory:  Negative for cough, shortness of breath and wheezing.    Cardiovascular:  Negative for chest pain, palpitations and leg swelling.   Gastrointestinal:  Negative for abdominal pain, diarrhea, nausea and vomiting.

## 2024-07-01 NOTE — PROGRESS NOTES
Medicare Annual Wellness Visit    Bronwyn Sterling is here for Medicare AWV    Assessment & Plan   Initial Medicare annual wellness visit  Recommendations for Preventive Services Due: see orders and patient instructions/AVS.  Recommended screening schedule for the next 5-10 years is provided to the patient in written form: see Patient Instructions/AVS.     No follow-ups on file.     Subjective       Patient's complete Health Risk Assessment and screening values have been reviewed and are found in Flowsheets. The following problems were reviewed today and where indicated follow up appointments were made and/or referrals ordered.    Positive Risk Factor Screenings with Interventions:    Fall Risk:  Do you feel unsteady or are you worried about falling? : (!) yes  2 or more falls in past year?: no  Fall with injury in past year?: no     Interventions:    Reviewed medications, home hazards, visual acuity, and co-morbidities that can increase risk for falls     Depression:  PHQ-2 Score: 0  PHQ-9 Total Score: 9    Interpretation:  5-9 mild   10-14 moderate   15-19 moderately severe   20-27 severe     Interventions:  Monitored by specialist. No acute findings meriting change in the plan          General HRA Questions:  Select all that apply: (!) New or Increased Pain, New or Increased Fatigue    Pain Interventions:  Following with rheum    Fatigue Interventions:  Following with rheum          Vision Screen:  Do you have difficulty driving, watching TV, or doing any of your daily activities because of your eyesight?: (!) Yes  Have you had an eye exam within the past year?: Yes  No results found.    Interventions:   Patient encouraged to make appointment with their eye specialist     ADL's:   Patient reports needing help with:  Select all that apply: (!) Dressing  Interventions:  Patient declined any further interventions or treatment     Tobacco Use:  Tobacco Use: High Risk (7/1/2024)    Patient History     Smoking Tobacco

## 2024-07-02 ENCOUNTER — SPECIALTY PHARMACY (OUTPATIENT)
Dept: PHARMACY | Facility: CLINIC | Age: 39
End: 2024-07-02

## 2024-07-02 DIAGNOSIS — Z78.9 ENROLLED IN SPECIALTY PHARMACY FOR MEDICATION: Primary | ICD-10-CM

## 2024-07-05 ENCOUNTER — SPECIALTY PHARMACY (OUTPATIENT)
Dept: PHARMACY | Facility: CLINIC | Age: 39
End: 2024-07-05

## 2024-07-20 DIAGNOSIS — M54.16 LUMBAR RADICULOPATHY: ICD-10-CM

## 2024-07-20 DIAGNOSIS — R61 EXCESSIVE SWEATING: ICD-10-CM

## 2024-07-21 NOTE — TELEPHONE ENCOUNTER
Pharmacy requesting medication refill. Please approve or deny this request.    Rx requested:  Requested Prescriptions     Pending Prescriptions Disp Refills    aluminum chloride (DRYSOL) 20 % external solution [Pharmacy Med Name: Drysol Dab-O-Matic 20 % topical solution] 60 mL 0     Sig: APPLY topically NIGHTLY    ibuprofen (ADVIL;MOTRIN) 800 MG tablet [Pharmacy Med Name: ibuprofen 800 mg tablet] 90 tablet 0     Sig: TAKE 1 TABLET BY MOUTH EVERY 8 HOURS AS NEEDED FOR PAIN         Last Office Visit:   7/1/2024      Next Visit Date:  Future Appointments   Date Time Provider Department Center   9/4/2024  9:30 AM [facility]

## 2024-07-22 RX ORDER — IBUPROFEN 800 MG/1
800 TABLET ORAL EVERY 8 HOURS PRN
Qty: 90 TABLET | Refills: 0 | Status: SHIPPED | OUTPATIENT
Start: 2024-07-22 | End: 2024-08-21

## 2024-07-30 PROCEDURE — RXMED WILLOW AMBULATORY MEDICATION CHARGE

## 2024-07-31 ENCOUNTER — PHARMACY VISIT (OUTPATIENT)
Dept: PHARMACY | Facility: CLINIC | Age: 39
End: 2024-07-31
Payer: MEDICARE

## 2024-08-02 DIAGNOSIS — K21.00 GASTROESOPHAGEAL REFLUX DISEASE WITH ESOPHAGITIS WITHOUT HEMORRHAGE: ICD-10-CM

## 2024-08-02 DIAGNOSIS — M54.16 LUMBAR RADICULOPATHY: ICD-10-CM

## 2024-08-02 RX ORDER — IBUPROFEN 800 MG/1
800 TABLET ORAL EVERY 8 HOURS PRN
Qty: 270 TABLET | Refills: 1 | Status: SHIPPED | OUTPATIENT
Start: 2024-08-02

## 2024-08-02 RX ORDER — LANSOPRAZOLE 30 MG/1
30 CAPSULE, DELAYED RELEASE ORAL DAILY
Qty: 90 CAPSULE | Refills: 1 | Status: SHIPPED | OUTPATIENT
Start: 2024-08-02

## 2024-08-02 RX ORDER — FAMOTIDINE 20 MG/1
20 TABLET, FILM COATED ORAL 2 TIMES DAILY PRN
Qty: 180 TABLET | Refills: 1 | Status: SHIPPED | OUTPATIENT
Start: 2024-08-02

## 2024-08-02 RX ORDER — ONDANSETRON 4 MG/1
4 TABLET, FILM COATED ORAL EVERY 8 HOURS PRN
Qty: 270 TABLET | Refills: 1 | Status: SHIPPED | OUTPATIENT
Start: 2024-08-02

## 2024-08-27 ENCOUNTER — SPECIALTY PHARMACY (OUTPATIENT)
Dept: PHARMACY | Facility: CLINIC | Age: 39
End: 2024-08-27

## 2024-08-27 PROCEDURE — RXMED WILLOW AMBULATORY MEDICATION CHARGE

## 2024-08-29 ENCOUNTER — PHARMACY VISIT (OUTPATIENT)
Dept: PHARMACY | Facility: CLINIC | Age: 39
End: 2024-08-29
Payer: MEDICARE

## 2024-09-04 ENCOUNTER — OFFICE VISIT (OUTPATIENT)
Dept: FAMILY MEDICINE CLINIC | Age: 39
End: 2024-09-04
Payer: COMMERCIAL

## 2024-09-04 VITALS
HEART RATE: 97 BPM | TEMPERATURE: 99.3 F | HEIGHT: 63 IN | SYSTOLIC BLOOD PRESSURE: 98 MMHG | BODY MASS INDEX: 22.86 KG/M2 | WEIGHT: 129 LBS | DIASTOLIC BLOOD PRESSURE: 66 MMHG

## 2024-09-04 DIAGNOSIS — K21.00 GASTROESOPHAGEAL REFLUX DISEASE WITH ESOPHAGITIS WITHOUT HEMORRHAGE: ICD-10-CM

## 2024-09-04 DIAGNOSIS — R61 EXCESSIVE SWEATING: Primary | ICD-10-CM

## 2024-09-04 DIAGNOSIS — M79.7 FIBROMYALGIA: ICD-10-CM

## 2024-09-04 DIAGNOSIS — Z82.49 FAMILY HISTORY OF CARDIOVASCULAR DISEASE: ICD-10-CM

## 2024-09-04 DIAGNOSIS — R55 PRE-SYNCOPE: ICD-10-CM

## 2024-09-04 DIAGNOSIS — R42 DIZZINESS: ICD-10-CM

## 2024-09-04 PROCEDURE — G2211 COMPLEX E/M VISIT ADD ON: HCPCS | Performed by: STUDENT IN AN ORGANIZED HEALTH CARE EDUCATION/TRAINING PROGRAM

## 2024-09-04 PROCEDURE — 99215 OFFICE O/P EST HI 40 MIN: CPT | Performed by: STUDENT IN AN ORGANIZED HEALTH CARE EDUCATION/TRAINING PROGRAM

## 2024-09-09 ASSESSMENT — ENCOUNTER SYMPTOMS
DIARRHEA: 0
SHORTNESS OF BREATH: 0
SORE THROAT: 0
RHINORRHEA: 0
EYE DISCHARGE: 0
ABDOMINAL PAIN: 0
WHEEZING: 0
VOMITING: 0
NAUSEA: 0
COUGH: 0

## 2024-09-25 ENCOUNTER — SPECIALTY PHARMACY (OUTPATIENT)
Dept: PHARMACY | Facility: CLINIC | Age: 39
End: 2024-09-25

## 2024-10-09 PROCEDURE — RXMED WILLOW AMBULATORY MEDICATION CHARGE

## 2024-10-24 ENCOUNTER — PHARMACY VISIT (OUTPATIENT)
Dept: PHARMACY | Facility: CLINIC | Age: 39
End: 2024-10-24
Payer: MEDICARE

## 2024-10-31 ENCOUNTER — OFFICE VISIT (OUTPATIENT)
Dept: CARDIOLOGY CLINIC | Age: 39
End: 2024-10-31
Payer: COMMERCIAL

## 2024-10-31 VITALS
RESPIRATION RATE: 16 BRPM | WEIGHT: 123 LBS | BODY MASS INDEX: 21.79 KG/M2 | OXYGEN SATURATION: 100 % | DIASTOLIC BLOOD PRESSURE: 80 MMHG | SYSTOLIC BLOOD PRESSURE: 110 MMHG | HEART RATE: 120 BPM

## 2024-10-31 DIAGNOSIS — R42 DIZZINESS: Primary | ICD-10-CM

## 2024-10-31 DIAGNOSIS — R55 SYNCOPE, UNSPECIFIED SYNCOPE TYPE: ICD-10-CM

## 2024-10-31 DIAGNOSIS — R00.0 TACHYCARDIA: ICD-10-CM

## 2024-10-31 DIAGNOSIS — R55 PRE-SYNCOPE: ICD-10-CM

## 2024-10-31 DIAGNOSIS — M79.7 FIBROMYALGIA: ICD-10-CM

## 2024-10-31 PROCEDURE — 93000 ELECTROCARDIOGRAM COMPLETE: CPT

## 2024-10-31 PROCEDURE — 99213 OFFICE O/P EST LOW 20 MIN: CPT

## 2024-10-31 RX ORDER — METOPROLOL TARTRATE 25 MG/1
12.5 TABLET, FILM COATED ORAL 2 TIMES DAILY
Qty: 90 TABLET | Refills: 1 | Status: SHIPPED | OUTPATIENT
Start: 2024-10-31

## 2024-10-31 ASSESSMENT — ENCOUNTER SYMPTOMS
CHEST TIGHTNESS: 0
SHORTNESS OF BREATH: 0

## 2024-10-31 NOTE — PROGRESS NOTES
Chief Complaint   Patient presents with    New Patient     POTS       Patient presents for initial medical evaluation. Patient is followed on a regular basis by Debora Schaeffer DO.     Patient with past medical history of fibromyalgia, migraines, dizziness, three spinal surgeries   Denies prior cardiac history.   Does report + family heart history of heart attacks, earliest age her uncle diagnosed at age 42  Never had an MI  Denies prior cardiac catheterization or stress test.       Referred by PCP for possible presyncope with POTS? Per her rheumatologist  and family history of cardiovascular disease      Patient presents with symptoms of LH, dizziness, fatigue, brain fog, palpitations, constant sweating.   Patient feels like there is days where she feels like she is going to pass out, and she feels she needs to hold the wall or whatever is nearby.   Patient also reports dizziness from sitting to standing.             Patient Active Problem List   Diagnosis    Allergy status to penicillin    Anesthesia of skin    Gastroesophageal reflux disease    Other specified postprocedural states    Bipolar affective disorder (HCC)    Cervical radiculopathy    Herniated nucleus pulposus, L4-5 right    Eczema    Cervicogenic headache    Em's syndrome    Intractable migraine without aura    Low back pain    Multiple benign nevi    Postoperative pain    Tinea versicolor    New daily persistent headache    Paresthesia of upper and lower extremities of both sides    History of lumbar laminectomy    Papilledema associated with increased intracranial pressure    Pseudotumor cerebri    Lumbar radiculopathy    Chronic migraine without aura without status migrainosus, not intractable    Abnormal finding on thyroid function test    Abnormal uterine bleeding    Acute bronchitis    Asteroid hyalosis of right eye    Attention deficit hyperactivity disorder (ADHD)    Exercise-induced asthma    Insomnia    Photopsia    Intractable

## 2024-11-13 ENCOUNTER — HOSPITAL ENCOUNTER (OUTPATIENT)
Age: 39
Discharge: HOME | End: 2024-11-15
Payer: COMMERCIAL

## 2024-11-13 VITALS
SYSTOLIC BLOOD PRESSURE: 110 MMHG | WEIGHT: 123 LBS | HEIGHT: 63 IN | DIASTOLIC BLOOD PRESSURE: 80 MMHG | BODY MASS INDEX: 21.79 KG/M2

## 2024-11-13 DIAGNOSIS — R55 SYNCOPE, UNSPECIFIED SYNCOPE TYPE: ICD-10-CM

## 2024-11-13 LAB
ECHO AO ROOT DIAM: 2.6 CM
ECHO AO ROOT INDEX: 1.66 CM/M2
ECHO AV AREA PEAK VELOCITY: 2.5 CM2
ECHO AV AREA VTI: 2.5 CM2
ECHO AV AREA/BSA PEAK VELOCITY: 1.6 CM2/M2
ECHO AV AREA/BSA VTI: 1.6 CM2/M2
ECHO AV MEAN GRADIENT: 5 MMHG
ECHO AV MEAN VELOCITY: 1.1 M/S
ECHO AV PEAK GRADIENT: 10 MMHG
ECHO AV PEAK VELOCITY: 1.6 M/S
ECHO AV VELOCITY RATIO: 0.88
ECHO AV VTI: 33.5 CM
ECHO BSA: 1.57 M2
ECHO EST RA PRESSURE: 10 MMHG
ECHO LA DIAMETER INDEX: 2.17 CM/M2
ECHO LA DIAMETER: 3.4 CM
ECHO LA TO AORTIC ROOT RATIO: 1.31
ECHO LA VOL A-L A2C: 31 ML (ref 22–52)
ECHO LA VOL A-L A4C: 45 ML (ref 22–52)
ECHO LA VOL MOD A2C: 30 ML (ref 22–52)
ECHO LA VOL MOD A4C: 43 ML (ref 22–52)
ECHO LA VOLUME AREA LENGTH: 39 ML
ECHO LA VOLUME INDEX A-L A2C: 20 ML/M2 (ref 16–34)
ECHO LA VOLUME INDEX A-L A4C: 29 ML/M2 (ref 16–34)
ECHO LA VOLUME INDEX AREA LENGTH: 25 ML/M2 (ref 16–34)
ECHO LA VOLUME INDEX MOD A2C: 19 ML/M2 (ref 16–34)
ECHO LA VOLUME INDEX MOD A4C: 27 ML/M2 (ref 16–34)
ECHO LV E' LATERAL VELOCITY: 19.9 CM/S
ECHO LV E' SEPTAL VELOCITY: 12.5 CM/S
ECHO LV EDV A2C: 55 ML
ECHO LV EDV A4C: 68 ML
ECHO LV EDV BP: 64 ML (ref 56–104)
ECHO LV EDV INDEX A4C: 43 ML/M2
ECHO LV EDV INDEX BP: 41 ML/M2
ECHO LV EDV NDEX A2C: 35 ML/M2
ECHO LV EJECTION FRACTION A2C: 64 %
ECHO LV EJECTION FRACTION A4C: 67 %
ECHO LV EJECTION FRACTION BIPLANE: 67 % (ref 55–100)
ECHO LV ESV A2C: 20 ML
ECHO LV ESV A4C: 23 ML
ECHO LV ESV BP: 21 ML (ref 19–49)
ECHO LV ESV INDEX A2C: 13 ML/M2
ECHO LV ESV INDEX A4C: 15 ML/M2
ECHO LV ESV INDEX BP: 13 ML/M2
ECHO LV FRACTIONAL SHORTENING: 26 % (ref 28–44)
ECHO LV INTERNAL DIMENSION DIASTOLE INDEX: 2.42 CM/M2
ECHO LV INTERNAL DIMENSION DIASTOLIC: 3.8 CM (ref 3.9–5.3)
ECHO LV INTERNAL DIMENSION SYSTOLIC INDEX: 1.78 CM/M2
ECHO LV INTERNAL DIMENSION SYSTOLIC: 2.8 CM
ECHO LV IVSD: 0.9 CM (ref 0.6–0.9)
ECHO LV IVSS: 0.9 CM
ECHO LV MASS 2D: 86 G (ref 67–162)
ECHO LV MASS INDEX 2D: 54.7 G/M2 (ref 43–95)
ECHO LV POSTERIOR WALL DIASTOLIC: 0.7 CM (ref 0.6–0.9)
ECHO LV POSTERIOR WALL SYSTOLIC: 1.1 CM
ECHO LV RELATIVE WALL THICKNESS RATIO: 0.37
ECHO LVOT AREA: 2.8 CM2
ECHO LVOT AV VTI INDEX: 0.9
ECHO LVOT DIAM: 1.9 CM
ECHO LVOT MEAN GRADIENT: 4 MMHG
ECHO LVOT PEAK GRADIENT: 8 MMHG
ECHO LVOT PEAK VELOCITY: 1.4 M/S
ECHO LVOT STROKE VOLUME INDEX: 54.2 ML/M2
ECHO LVOT SV: 85 ML
ECHO LVOT VTI: 30 CM
ECHO MV A VELOCITY: 0.77 M/S
ECHO MV E DECELERATION TIME (DT): 169.5 MS
ECHO MV E VELOCITY: 1.03 M/S
ECHO MV E/A RATIO: 1.34
ECHO MV E/E' LATERAL: 5.18
ECHO MV E/E' RATIO (AVERAGED): 6.71
ECHO MV E/E' SEPTAL: 8.24
ECHO PV ACCELERATION TIME (AT): 118 MS
ECHO PV MAX VELOCITY: 1.3 M/S
ECHO PV PEAK GRADIENT: 6 MMHG
ECHO PVEIN A DURATION: 118 MS
ECHO PVEIN A VELOCITY: 0.3 M/S
ECHO PVEIN PEAK D VELOCITY: 0.6 M/S
ECHO PVEIN PEAK S VELOCITY: 0.6 M/S
ECHO PVEIN S/D RATIO: 1
ECHO RIGHT VENTRICULAR SYSTOLIC PRESSURE (RVSP): 46 MMHG
ECHO RV INTERNAL DIMENSION: 2.8 CM
ECHO RV TAPSE: 2.6 CM (ref 1.7–?)
ECHO TV REGURGITANT MAX VELOCITY: 2.98 M/S
ECHO TV REGURGITANT PEAK GRADIENT: 35 MMHG

## 2024-11-13 PROCEDURE — 93306 TTE W/DOPPLER COMPLETE: CPT

## 2024-11-20 RX ORDER — TRIAMCINOLONE ACETONIDE 1 MG/G
CREAM TOPICAL
Qty: 3 EACH | Refills: 1 | Status: SHIPPED | OUTPATIENT
Start: 2024-11-20

## 2024-11-20 RX ORDER — CLINDAMYCIN PHOSPHATE 10 MG/G
GEL TOPICAL
Qty: 3 EACH | Refills: 1 | Status: SHIPPED | OUTPATIENT
Start: 2024-11-20

## 2024-12-04 ENCOUNTER — TELEPHONE (OUTPATIENT)
Dept: CARDIOLOGY CLINIC | Age: 39
End: 2024-12-04

## 2024-12-04 ENCOUNTER — OFFICE VISIT (OUTPATIENT)
Dept: FAMILY MEDICINE CLINIC | Age: 39
End: 2024-12-04
Payer: COMMERCIAL

## 2024-12-04 VITALS
SYSTOLIC BLOOD PRESSURE: 104 MMHG | TEMPERATURE: 98.6 F | HEIGHT: 63 IN | DIASTOLIC BLOOD PRESSURE: 60 MMHG | WEIGHT: 127 LBS | OXYGEN SATURATION: 98 % | BODY MASS INDEX: 22.5 KG/M2 | HEART RATE: 93 BPM

## 2024-12-04 DIAGNOSIS — M79.7 FIBROMYALGIA: ICD-10-CM

## 2024-12-04 DIAGNOSIS — R42 DIZZINESS: ICD-10-CM

## 2024-12-04 DIAGNOSIS — R61 EXCESSIVE SWEATING: Primary | ICD-10-CM

## 2024-12-04 DIAGNOSIS — K21.00 GASTROESOPHAGEAL REFLUX DISEASE WITH ESOPHAGITIS WITHOUT HEMORRHAGE: ICD-10-CM

## 2024-12-04 PROCEDURE — 99214 OFFICE O/P EST MOD 30 MIN: CPT | Performed by: STUDENT IN AN ORGANIZED HEALTH CARE EDUCATION/TRAINING PROGRAM

## 2024-12-04 PROCEDURE — G2211 COMPLEX E/M VISIT ADD ON: HCPCS | Performed by: STUDENT IN AN ORGANIZED HEALTH CARE EDUCATION/TRAINING PROGRAM

## 2024-12-04 NOTE — PROGRESS NOTES
SpO2: 98%   Weight: 57.6 kg (127 lb)   Height: 1.6 m (5' 3\")       Physical Exam  Vitals reviewed.   Constitutional:       Appearance: Normal appearance.   HENT:      Head: Normocephalic and atraumatic.      Nose: Nose normal.      Mouth/Throat:      Mouth: Mucous membranes are moist.   Eyes:      Conjunctiva/sclera: Conjunctivae normal.   Pulmonary:      Effort: Pulmonary effort is normal.   Skin:     General: Skin is warm and dry.   Neurological:      General: No focal deficit present.      Mental Status: She is alert. Mental status is at baseline.   Psychiatric:         Mood and Affect: Mood normal.         Behavior: Behavior normal.            Assessment & Plan     Assessment & Plan  1.  Excessive sweating  Stable, chronic.  Drysol is helping with no side effects. She uses it two to three times a week, mostly at night and occasionally in the morning due to her sensitive skin. A prescription for a 90-day supply of Drysol was sent to [facility].    2. Cardiological evaluation.  Continue to monitor.  Keep all appointments as scheduled.  She has an appointment with cardiology tomorrow to discuss the results of her echocardiogram and two EKGs. She is also trying to schedule a tilt table test but is facing difficulties due to limited availability and long wait times.    3. Acid reflux.  Stable, chronic.  Her acid reflux is well-controlled with current treatment. No changes in medication are needed.  Continue Prevacid 30 mg as needed.  No refill needed.    4.  Fibromyalgia  Stable, chronic.  Continue to follow with rheumatology.    Follow-up  Return in 6 months for follow up.    No orders of the defined types were placed in this encounter.    Orders Placed This Encounter   Medications    aluminum chloride (DRYSOL) 20 % external solution     Sig: APPLY topically NIGHTLY     Dispense:  180 mL     Refill:  1     This prescription was filled on 6/30/2024. Any refills authorized will be placed on file.     Medications

## 2024-12-04 NOTE — TELEPHONE ENCOUNTER
PATIENT HAS F/U ON ECHO AND ZIO MONITOR WITH MAJA ON 12/5/24   ZIO MONITOR WAS NOT REGISTERED WHEN IT WAS PUT ON DURING LOV 10/31/24. ORDER FOUND TODAY AND MONITOR REGISTERED. REPORT WILL NOT BE BACK FOR A COUPLE DAYS.    PATIENT WANTS TO KEEP APPT TO DISCUSS THE ECHO RESULTS AND THEN GET A CALL BACK ON THE MONITOR RESULTS

## 2024-12-05 ENCOUNTER — OFFICE VISIT (OUTPATIENT)
Dept: CARDIOLOGY CLINIC | Age: 39
End: 2024-12-05

## 2024-12-05 VITALS
SYSTOLIC BLOOD PRESSURE: 118 MMHG | WEIGHT: 127 LBS | BODY MASS INDEX: 22.5 KG/M2 | OXYGEN SATURATION: 97 % | DIASTOLIC BLOOD PRESSURE: 68 MMHG | HEART RATE: 86 BPM

## 2024-12-05 DIAGNOSIS — Z71.2 ENCOUNTER TO DISCUSS TEST RESULTS: Primary | ICD-10-CM

## 2024-12-05 ASSESSMENT — ENCOUNTER SYMPTOMS
CHEST TIGHTNESS: 0
SHORTNESS OF BREATH: 0

## 2024-12-05 NOTE — PROGRESS NOTES
Chief Complaint   Patient presents with    Results     ECHOCARDIOGRAM       Patient presents for initial medical evaluation. Patient is followed on a regular basis by Debora Schaeffer DO.     Patient with past medical history of fibromyalgia, migraines, dizziness, three spinal surgeries   Denies prior cardiac history.   Does report + family heart history of heart attacks, earliest age her uncle diagnosed at age 42  Never had an MI  Denies prior cardiac catheterization or stress test.       Referred by PCP for possible presyncope with POTS? Per her rheumatologist  and family history of cardiovascular disease      Patient presents with symptoms of LH, dizziness, fatigue, brain fog, palpitations, constant sweating.   Patient feels like there is days where she feels like she is going to pass out, and she feels she needs to hold the wall or whatever is nearby.   Patient also reports dizziness from sitting to standing.       12-5-24: Follow up for echo showing normal LVEF with mild pulm HTN and mild TR   Patient continues to report LH, dizziness, palpitations, brain fog, sweating.   We will await further testing.  Pt denies chest pain, dyspnea, dyspnea on exertion, change in exercise capacity, fatigue,  nausea, vomiting, diarrhea, constipation, motor weakness, insomnia, weight loss, syncope, dizziness, lightheadedness, palpitations, PND, orthopnea, or claudication.        Patient Active Problem List   Diagnosis    Allergy status to penicillin    Anesthesia of skin    Gastroesophageal reflux disease    Other specified postprocedural states    Bipolar affective disorder (HCC)    Cervical radiculopathy    Herniated nucleus pulposus, L4-5 right    Eczema    Cervicogenic headache    Em's syndrome    Intractable migraine without aura    Low back pain    Multiple benign nevi    Postoperative pain    Tinea versicolor    New daily persistent headache    Paresthesia of upper and lower extremities of both sides    History

## 2024-12-09 DIAGNOSIS — R55 PRE-SYNCOPE: ICD-10-CM

## 2025-01-08 ENCOUNTER — HOSPITAL ENCOUNTER (OUTPATIENT)
Dept: GENERAL RADIOLOGY | Age: 40
Discharge: HOME | End: 2025-01-10
Attending: OBSTETRICS & GYNECOLOGY
Payer: COMMERCIAL

## 2025-01-08 ENCOUNTER — TELEPHONE (OUTPATIENT)
Dept: OBGYN CLINIC | Age: 40
End: 2025-01-08

## 2025-01-08 DIAGNOSIS — T85.121A: ICD-10-CM

## 2025-01-08 DIAGNOSIS — N39.41 URGE INCONTINENCE: ICD-10-CM

## 2025-01-08 DIAGNOSIS — N39.41 URGE INCONTINENCE: Primary | ICD-10-CM

## 2025-01-08 PROCEDURE — 72220 X-RAY EXAM SACRUM TAILBONE: CPT

## 2025-01-08 NOTE — TELEPHONE ENCOUNTER
Patient called this morning c/o a bulge and severe pain from Interstim. She had stage 1&2 7/13/2023 and thinks the device may have shifted. She is scheduled for next available office day 1/10/25. She has lost approximately 45lbs since implant and xray ordered for patient to complete prior to appointment. Xray was done this afternoon and she is calling stating the xray tech told her the device shifted and with the pain she is having, she should call the office to either see another provider in the practice or to try to get in sooner.   I called and spoke with Dr. Kruse who advised patient should limit her movements, take Tylenol and Ibuprofen and come to appointment scheduled on Friday. [employer] message sent to patient.

## 2025-01-10 ENCOUNTER — OFFICE VISIT (OUTPATIENT)
Dept: OBGYN CLINIC | Age: 40
End: 2025-01-10
Payer: COMMERCIAL

## 2025-01-10 VITALS
DIASTOLIC BLOOD PRESSURE: 60 MMHG | HEIGHT: 63 IN | WEIGHT: 126 LBS | HEART RATE: 96 BPM | SYSTOLIC BLOOD PRESSURE: 102 MMHG | BODY MASS INDEX: 22.32 KG/M2

## 2025-01-10 DIAGNOSIS — N39.41 URGENCY INCONTINENCE: Primary | ICD-10-CM

## 2025-01-10 PROCEDURE — 99213 OFFICE O/P EST LOW 20 MIN: CPT | Performed by: OBSTETRICS & GYNECOLOGY

## 2025-01-10 SDOH — ECONOMIC STABILITY: FOOD INSECURITY: WITHIN THE PAST 12 MONTHS, THE FOOD YOU BOUGHT JUST DIDN'T LAST AND YOU DIDN'T HAVE MONEY TO GET MORE.: NEVER TRUE

## 2025-01-10 SDOH — ECONOMIC STABILITY: FOOD INSECURITY: WITHIN THE PAST 12 MONTHS, YOU WORRIED THAT YOUR FOOD WOULD RUN OUT BEFORE YOU GOT MONEY TO BUY MORE.: NEVER TRUE

## 2025-01-10 ASSESSMENT — PATIENT HEALTH QUESTIONNAIRE - PHQ9
4. FEELING TIRED OR HAVING LITTLE ENERGY: NEARLY EVERY DAY
1. LITTLE INTEREST OR PLEASURE IN DOING THINGS: NOT AT ALL
2. FEELING DOWN, DEPRESSED OR HOPELESS: NOT AT ALL
SUM OF ALL RESPONSES TO PHQ QUESTIONS 1-9: 11
6. FEELING BAD ABOUT YOURSELF - OR THAT YOU ARE A FAILURE OR HAVE LET YOURSELF OR YOUR FAMILY DOWN: NOT AT ALL
SUM OF ALL RESPONSES TO PHQ9 QUESTIONS 1 & 2: 0
5. POOR APPETITE OR OVEREATING: NEARLY EVERY DAY
SUM OF ALL RESPONSES TO PHQ QUESTIONS 1-9: 11
8. MOVING OR SPEAKING SO SLOWLY THAT OTHER PEOPLE COULD HAVE NOTICED. OR THE OPPOSITE, BEING SO FIGETY OR RESTLESS THAT YOU HAVE BEEN MOVING AROUND A LOT MORE THAN USUAL: NOT AT ALL
3. TROUBLE FALLING OR STAYING ASLEEP: NEARLY EVERY DAY
SUM OF ALL RESPONSES TO PHQ QUESTIONS 1-9: 11
SUM OF ALL RESPONSES TO PHQ QUESTIONS 1-9: 11
9. THOUGHTS THAT YOU WOULD BE BETTER OFF DEAD, OR OF HURTING YOURSELF: NOT AT ALL
10. IF YOU CHECKED OFF ANY PROBLEMS, HOW DIFFICULT HAVE THESE PROBLEMS MADE IT FOR YOU TO DO YOUR WORK, TAKE CARE OF THINGS AT HOME, OR GET ALONG WITH OTHER PEOPLE: NOT DIFFICULT AT ALL
7. TROUBLE CONCENTRATING ON THINGS, SUCH AS READING THE NEWSPAPER OR WATCHING TELEVISION: MORE THAN HALF THE DAYS

## 2025-01-10 NOTE — PROGRESS NOTES
Bronwyn Sterling is a 40 y.o. female who presents here today for complaints of Lower Back Pain (And bulge at Interstim site. Stage 1&2 7/13/23. X-ray done 1/8/25.)        History of Present Illness  The patient presents for evaluation of pain associated with a device.    She reports a persistent sensation of the device in her buttock, which she describes as mildly uncomfortable. She experiences intermittent shooting and stabbing pain, not localized to a specific area but rather radiating throughout her body. The onset of this pain was noted upon awakening one morning, without any preceding strenuous activity or heavy lifting. An x-ray was performed, revealing a displacement of the device. She has not made any recent adjustments to the device. The pain trajectory typically ascends along her spine and then descends down her leg, confined to one side. Additionally, she reports a decreased frequency of back pain episodes.    She has been on Vyvanse, an appetite suppressant, which has resulted in significant weight loss. She weighed almost 180 pounds before the surgery.    MEDICATIONS  Vyvanse         Vitals:  /60 (Site: Right Upper Arm, Position: Sitting, Cuff Size: Medium Adult)   Pulse 96   Ht 1.6 m (5' 3\")   Wt 57.2 kg (126 lb)   LMP 08/22/2023   BMI 22.32 kg/m²   Allergies:  Amoxicillin; Anesthetics, halogenated; Dermabond; Oxycodone; Tramadol; Oxycodone-acetaminophen; Topiramate; and Wound dressing adhesive  Past Medical History:   Diagnosis Date    Asthma     exercise induced    Migraines      Past Surgical History:   Procedure Laterality Date    BACK SURGERY      2006    BACK SURGERY  08/05/2021    BREAST ENHANCEMENT SURGERY      COLONOSCOPY      prior to 2015, dr chaidez?    COLONOSCOPY N/A 04/20/2023    COLONOSCOPY DIAGNOSTIC performed by Evelio Araujo MD at [facility]    COSMETIC SURGERY      breast implants    ENDOSCOPY, COLON, DIAGNOSTIC      prior to 2015, dr hancock?    FINGER SURGERY

## 2025-01-17 ENCOUNTER — SPECIALTY PHARMACY (OUTPATIENT)
Dept: PHARMACY | Facility: CLINIC | Age: 40
End: 2025-01-17

## 2025-01-17 PROCEDURE — RXMED WILLOW AMBULATORY MEDICATION CHARGE

## 2025-01-20 ENCOUNTER — PHARMACY VISIT (OUTPATIENT)
Dept: PHARMACY | Facility: CLINIC | Age: 40
End: 2025-01-20
Payer: MEDICARE

## 2025-01-22 ENCOUNTER — OFFICE VISIT (OUTPATIENT)
Dept: OBGYN CLINIC | Age: 40
End: 2025-01-22
Payer: COMMERCIAL

## 2025-01-22 VITALS
HEART RATE: 84 BPM | SYSTOLIC BLOOD PRESSURE: 100 MMHG | DIASTOLIC BLOOD PRESSURE: 62 MMHG | WEIGHT: 130 LBS | BODY MASS INDEX: 23.04 KG/M2 | HEIGHT: 63 IN

## 2025-01-22 DIAGNOSIS — N39.41 URGE INCONTINENCE: ICD-10-CM

## 2025-01-22 DIAGNOSIS — N39.41 URGE INCONTINENCE: Primary | ICD-10-CM

## 2025-01-22 LAB
BILIRUB UR QL STRIP: NEGATIVE
CLARITY UR: CLEAR
COLOR UR: YELLOW
GLUCOSE UR STRIP-MCNC: NEGATIVE MG/DL
HGB UR QL STRIP: NEGATIVE
KETONES UR STRIP-MCNC: NEGATIVE MG/DL
LEUKOCYTE ESTERASE UR QL STRIP: NEGATIVE
NITRITE UR QL STRIP: NEGATIVE
PH UR STRIP: 6.5 [PH] (ref 5–9)
PROT UR STRIP-MCNC: NEGATIVE MG/DL
SP GR UR STRIP: 1 (ref 1–1.03)
UROBILINOGEN UR STRIP-ACNC: 0.2 E.U./DL

## 2025-01-22 PROCEDURE — 99213 OFFICE O/P EST LOW 20 MIN: CPT | Performed by: OBSTETRICS & GYNECOLOGY

## 2025-01-23 LAB — BACTERIA UR CULT: NORMAL

## 2025-01-27 NOTE — PROGRESS NOTES
Bronwyn Sterling is a 40 y.o. female who presents here today for complaints of Follow-up (Urge incontinence. )        History of Present Illness  The patient presents for evaluation of urinary issues.    She reports an improvement in her condition following the adjustment of her device settings during the previous visit. She experienced a transient sensation akin to a stabbing pain, which has since subsided. However, she continues to perceive a mild sensation in her back, described as feeling like the cord is drooling under her skin. This sensation is not as severe as it was previously and is currently manageable. Her urinary symptoms have also shown improvement, particularly in the morning upon waking. These symptoms are now intermittent and significantly less severe than before the device was implanted. She recalls a previous trial of Myrbetriq, which resulted in adverse side effects. She expresses a desire to monitor her condition before considering any additional interventions. She has been traveling frequently and undergoing TSA checks, which she believes may be contributing to her symptoms. She has observed that these symptoms do not typically manifest in [facility], or [place]. She has had to discontinue going through the scanners due to occasional shocks, even when the device is turned off. She has initiated manual checks as an alternative to using the machines.    MEDICATIONS  Past: Myrbetriq         Vitals:  /62 (Site: Left Upper Arm, Position: Sitting, Cuff Size: Medium Adult)   Pulse 84   Ht 1.6 m (5' 3\")   Wt 59 kg (130 lb)   LMP 08/22/2023   BMI 23.03 kg/m²   Allergies:  Amoxicillin; Anesthetics, halogenated; Dermabond; Oxycodone; Tramadol; Oxycodone-acetaminophen; Topiramate; and Wound dressing adhesive  Past Medical History:   Diagnosis Date    Asthma     exercise induced    Migraines      Past Surgical History:   Procedure Laterality Date    BACK SURGERY      2006    BACK

## 2025-02-10 ENCOUNTER — TELEPHONE (OUTPATIENT)
Age: 40
End: 2025-02-10

## 2025-02-10 NOTE — TELEPHONE ENCOUNTER
Close reason: Other  Payer: [employer] Patient's Payer Case ID: [id number]    [phone]  Note from payer: The medication you have requested is not covered by Medicare Part D Law. If you believe the medication is being used for a medically accepted or compendia supported indication approved by CMS, please contact your patient's plan.  Prior auth initiated by: [facility] - [place] - [address] - P [phone] - F [phone]

## 2025-02-10 NOTE — TELEPHONE ENCOUNTER
Called & spoke with patient & informed that insurance does not cover Drysol & does not cover Polyethylene Glycol. Patient stated clear understanding.

## 2025-04-02 NOTE — H&P
History Of Present Illness  Joceline Hess is a 40 y.o. female presenting with history of syncope, dizziness, and heart racing. Symptoms started when pregnant 2 years ago.  Concerns for POTS. Here for tilt table test. PMH includes fibromyalgia, migraines, von Willebrand disease, GERD, exercise induced asthma, bipolar, ADHD, horners syndrome      Past Medical History:  Von Willebrand disease, Fibromyalgia, bipolar, ADHD,exercise induced asthma, GERD, herniated disk L4-6, horners syndrome, migraines, pseudotumor cerebri      Past Surgical History:  Past Surgical History:   Procedure Laterality Date    OTHER SURGICAL HISTORY  08/14/2019    Breast augmentation      Laminectomy  Hysterectomy  Liposuction      Social History:   Single  Current smoker 0.5ppd X19 years,   ETOH rare  Denies recreational drug use    Family History:  Uncle-heart attack-age 42     Allergies:  Allergies   Allergen Reactions    Inhaled Anesthetics (Halogen Based) Anaphylaxis     Hard to come out of it, hyperventilating, convulsion, nonresponsive    Topiramate Hallucinations, Hives and Rash     Heart races and anxiety    Amoxicillin Hives     Unsure if still allergic    Oxycodone Nausea Only    Tramadol Nausea/vomiting    Adhesive Rash     From bandaids.    Oxycodone-Acetaminophen Hives, Nausea And Vomiting and Nausea/vomiting        Home Medications:  Current Outpatient Medications   Medication Instructions    ubrogepant (Ubrelvy) 100 mg tablet tablet Take 1 tablet (100 mg) by mouth at onset of migraine.  May repeat dose after 2 hours if needed.  Do not exceed max of 200mg (2 tablets) per 24 hours.       Inpatient Medications:  Scheduled medications   Medication Dose Route Frequency     PRN medications   Medication     Continuous Medications   Medication Dose Last Rate    sodium chloride 0.9%  100 mL/hr           Review of Systems   Constitutional:  Positive for fatigue.   HENT: Negative.     Eyes: Negative.    Respiratory: Negative.    "  Cardiovascular:  Positive for palpitations.   Gastrointestinal: Negative.    Endocrine: Negative.    Genitourinary: Negative.    Musculoskeletal: Negative.    Skin: Negative.    Allergic/Immunologic: Negative.    Neurological:  Positive for dizziness.   Hematological: Negative.    Psychiatric/Behavioral: Negative.            Physical Exam  Constitutional:       General: She is awake. She is not in acute distress.     Appearance: She is not ill-appearing.   HENT:      Head: Normocephalic and atraumatic.      Mouth/Throat:      Mouth: Mucous membranes are moist.      Pharynx: Oropharynx is clear.   Cardiovascular:      Rate and Rhythm: Normal rate and regular rhythm.      Pulses:           Radial pulses are 2+ on the right side and 2+ on the left side.        Dorsalis pedis pulses are 2+ on the right side and 2+ on the left side.      Heart sounds: Normal heart sounds. No murmur heard.  Pulmonary:      Effort: Pulmonary effort is normal.      Breath sounds: Normal breath sounds.   Abdominal:      General: Bowel sounds are normal.      Palpations: Abdomen is soft.   Musculoskeletal:         General: Normal range of motion.      Cervical back: Normal range of motion and neck supple.      Right lower leg: No edema.      Left lower leg: No edema.   Skin:     General: Skin is warm and dry.      Capillary Refill: Capillary refill takes less than 2 seconds.   Neurological:      General: No focal deficit present.      Mental Status: She is alert and oriented to person, place, and time. Mental status is at baseline.      Cranial Nerves: Cranial nerves 2-12 are intact.   Psychiatric:         Mood and Affect: Mood and affect normal.         Behavior: Behavior normal.        Sedation Plan    ASA 2     Mallampati class: II.           NPO since 1930 on 4/3/2025    Last Recorded Vitals  Blood pressure 130/84, pulse 87, temperature 36.4 °C (97.5 °F), temperature source Temporal, resp. rate 16, height 1.6 m (5' 3\"), weight 56.7 kg " "(125 lb), SpO2 100%.         Vitals from the Past 24 Hours  Heart Rate:  [87]   Temp:  [36.4 °C (97.5 °F)]   Resp:  [16]   BP: (130)/(84)   Height:  [160 cm (5' 3\")]   Weight:  [56.7 kg (125 lb)]   SpO2:  [100 %]          Relevant Results    Labs    POCT Glucose:      POCT Urine Pregnancy:       CBC:   Recent Labs     10/03/19  0820 08/27/18  1605   WBC 5.6 8.2   HGB 13.5 14.1   HCT 40.7 42.8    328   MCV 95 94     BMP/CMP: No results for input(s): \"NA\", \"K\", \"CL\", \"BUN\", \"CREATININE\", \"CO2\", \"CALCIUM\", \"PROT\", \"BILITOT\", \"ALKPHOS\", \"ALT\", \"AST\", \"GLUCOSE\" in the last 96887 hours.    No lab exists for component: \"LABALBU\"   Magnesium: No results for input(s): \"MG\" in the last 87540 hours.  Lipid Panel: No results for input(s): \"CHOL\", \"HDL\", \"CHHDL\", \"LDL\", \"VLDL\", \"TRIG\", \"NHDL\" in the last 26487 hours.  Cardiac       No lab exists for component: \"CK\", \"CKMBP\"   Hemoglobin A1C:   Recent Labs     02/16/23  1411 10/22/21  1216 05/25/21  1256   HGBA1C 5.3 5.0 5.4     TSH/ Free T4:   Recent Labs     10/03/19  0820 08/27/18  1605   TSH 1.03 1.03     Iron: No results for input(s): \"FERRITIN\", \"TIBC\", \"IRONSAT\", \"BNP\" in the last 02585 hours.  Coag:     ABO: No results found for: \"ABO\"    Past Cardiology Tests (Last 3 Years):    EKG:  No results for input(s): \"ATRRATE\", \"VENTRATE\", \"PRINT\", \"QRSDUR\", \"QTCFRED\", \"QTCCALCB\" in the last 66047 hours.No results found for this or any previous visit (from the past 4464 hours).  Echo:  Echocardiogram:   Echocardiogram 11/13/2024    Narrative    Left Ventricle: Normal left ventricular systolic function with a  visually estimated EF of 60 - 65%. Left ventricle size is normal. Normal  wall thickness. Normal wall motion. Normal diastolic function.    Tricuspid Valve: Mild regurgitation. Mildly elevated RVSP, consistent  with mild pulmonary hypertension. The estimated RVSP is 46 mmHg.    Image quality is good.    Left Ventricle  Normal left ventricular systolic function with a " "visually estimated EF of 60 - 65%. Left ventricle size is normal. Normal wall thickness. Normal wall motion. Normal diastolic function.    Right Ventricle  Right ventricle size is normal. Normal systolic function.    Left Atrium  Left atrium size is normal.    Right Atrium  Right atrium size is normal.    Mitral Valve  Valve structure is normal. No regurgitation. No stenosis noted.    Tricuspid Valve  Valve structure is normal. Mild regurgitation. No stenosis noted. Mildly elevated RVSP, consistent with mild pulmonary hypertension. The estimated RVSP is 46 mmHg.    Aortic Valve  Valve structure is normal. No regurgitation. No stenosis.    Pulmonic Valve  The pulmonic valve was not well visualized. Unable to assess regurgitation. Unable to assess severity of stenosis stenosis noted.    Ascending Aorta  Normal sized aortic root and ascending aorta.    Pericardium  No pericardial effusion.    Study Details  Image quality: good. The underlying ECG rhythm was sinus rhythm. Color flow Doppler was performed and pulse wave and/or continuous wave Doppler was performed. No contrast was given.    Ejection Fractions:  No results found for: \"EF\"  Cath:  Coronary Angiography: No results found for this or any previous visit from the past 1800 days.    Right Heart Cath: No results found for this or any previous visit from the past 1800 days.    Stress Test:  Nuclear:No results found for this or any previous visit from the past 1800 days.    Metabolic Stress: No results found for this or any previous visit from the past 1800 days.    Cardiac Imaging:  Cardiac Scoring: No results found for this or any previous visit from the past 1800 days.    Cardiac MRI: No results found for this or any previous visit from the past 1800 days.         Assessment/Plan  Assessment/Plan   Assessment & Plan  Syncope and collapse    Syncope  Dizziness  Tachycardia  Palpitations    Concerns for POTS    Here for tilt table test with Dr. Chacon 4/4/2025. "            NP discussed with Dr. Chacon regarding plan of care/ discharge plan      I spent 35 minutes in the professional and overall care of this patient.      Meri Mcclure, MANNY-CNP

## 2025-04-04 ENCOUNTER — HOSPITAL ENCOUNTER (OUTPATIENT)
Dept: CARDIOLOGY | Facility: HOSPITAL | Age: 40
Setting detail: OUTPATIENT SURGERY
Discharge: HOME | End: 2025-04-04
Payer: COMMERCIAL

## 2025-04-04 VITALS
WEIGHT: 125 LBS | DIASTOLIC BLOOD PRESSURE: 71 MMHG | HEART RATE: 73 BPM | HEIGHT: 63 IN | TEMPERATURE: 97.5 F | BODY MASS INDEX: 22.15 KG/M2 | RESPIRATION RATE: 16 BRPM | SYSTOLIC BLOOD PRESSURE: 115 MMHG | OXYGEN SATURATION: 100 %

## 2025-04-04 DIAGNOSIS — R55 SYNCOPE AND COLLAPSE: ICD-10-CM

## 2025-04-04 LAB — BODY SURFACE AREA: 1.59 M2

## 2025-04-04 PROCEDURE — 93660 TILT TABLE EVALUATION: CPT

## 2025-04-04 RX ORDER — SODIUM CHLORIDE 9 MG/ML
100 INJECTION, SOLUTION INTRAVENOUS CONTINUOUS
Status: DISCONTINUED | OUTPATIENT
Start: 2025-04-04 | End: 2025-04-05 | Stop reason: HOSPADM

## 2025-04-04 ASSESSMENT — ENCOUNTER SYMPTOMS
PSYCHIATRIC NEGATIVE: 1
ALLERGIC/IMMUNOLOGIC NEGATIVE: 1
ENDOCRINE NEGATIVE: 1
GASTROINTESTINAL NEGATIVE: 1
PALPITATIONS: 1
RESPIRATORY NEGATIVE: 1
HEMATOLOGIC/LYMPHATIC NEGATIVE: 1
DIZZINESS: 1
FATIGUE: 1
EYES NEGATIVE: 1
MUSCULOSKELETAL NEGATIVE: 1

## 2025-04-04 ASSESSMENT — PAIN - FUNCTIONAL ASSESSMENT
PAIN_FUNCTIONAL_ASSESSMENT: 0-10
PAIN_FUNCTIONAL_ASSESSMENT: 0-10

## 2025-04-04 ASSESSMENT — COLUMBIA-SUICIDE SEVERITY RATING SCALE - C-SSRS
6. HAVE YOU EVER DONE ANYTHING, STARTED TO DO ANYTHING, OR PREPARED TO DO ANYTHING TO END YOUR LIFE?: NO
1. IN THE PAST MONTH, HAVE YOU WISHED YOU WERE DEAD OR WISHED YOU COULD GO TO SLEEP AND NOT WAKE UP?: NO

## 2025-04-04 ASSESSMENT — PAIN SCALES - GENERAL: PAINLEVEL_OUTOF10: 0 - NO PAIN

## 2025-04-14 LAB — BODY SURFACE AREA: 1.59 M2

## 2025-06-04 ENCOUNTER — HOSPITAL ENCOUNTER (OUTPATIENT)
Dept: PHYSICAL THERAPY | Age: 40
Setting detail: THERAPIES SERIES
Discharge: HOME | End: 2025-06-04
Payer: COMMERCIAL

## 2025-06-04 PROCEDURE — 97163 PT EVAL HIGH COMPLEX 45 MIN: CPT
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
## 2025-06-10 ENCOUNTER — HOSPITAL ENCOUNTER (OUTPATIENT)
Dept: PHYSICAL THERAPY | Age: 40
Setting detail: THERAPIES SERIES
Discharge: HOME | End: 2025-06-10
Payer: COMMERCIAL

## 2025-06-10 PROCEDURE — 97110 THERAPEUTIC EXERCISES: CPT

## 2025-06-10 ASSESSMENT — PAIN SCALES - GENERAL: PAINLEVEL_OUTOF10: 2

## 2025-06-10 NOTE — PROGRESS NOTES
evaluation, education, & procurement, Modalities, Positioning, Group Therapy  Modalities: Heat/Cold, Ultrasound  Pt to continue current HEP.  See objective section for any therapeutic exercise changes, additions or modifications this date.    Therapy Time:      PT Individual Minutes  Time In: 0926  Time Out: 1002  Minutes: 36  Timed Code Treatment Minutes: 36 Minutes  Procedure Minutes: n/a  Timed Activity Minutes Units   Ther Ex 31 2   Manual  5 0     Electronically signed by Lynette Ivy PTA on 6/10/25 at 10:10 AM EDT

## 2025-06-12 ENCOUNTER — HOSPITAL ENCOUNTER (OUTPATIENT)
Dept: PHYSICAL THERAPY | Age: 40
Setting detail: THERAPIES SERIES
Discharge: HOME | End: 2025-06-12
Payer: COMMERCIAL

## 2025-06-12 ASSESSMENT — PAIN SCALES - GENERAL: PAINLEVEL_OUTOF10: 6

## 2025-06-12 ASSESSMENT — PAIN DESCRIPTION - ORIENTATION: ORIENTATION: LEFT;RIGHT

## 2025-06-12 ASSESSMENT — PAIN DESCRIPTION - LOCATION: LOCATION: NECK;SHOULDER

## 2025-06-12 NOTE — PROGRESS NOTES
training, Equipment evaluation, education, & procurement, Modalities, Positioning, Group Therapy  Modalities: Heat/Cold, Ultrasound  Pt to continue current HEP.  See objective section for any therapeutic exercise changes, additions or modifications this date.    Therapy Time:      PT Individual Minutes  Time In: 1304  Time Out: 1343  Minutes: 39  Timed Code Treatment Minutes: 39 Minutes  Procedure Minutes: n/a  Timed Activity Minutes Units   Ther Ex 39 3     Electronically signed by Lynette Ivy PTA on 6/12/25 at 1:59 PM EDT

## 2025-06-16 ENCOUNTER — HOSPITAL ENCOUNTER (OUTPATIENT)
Dept: PHYSICAL THERAPY | Age: 40
Setting detail: THERAPIES SERIES
Discharge: HOME | End: 2025-06-16
Payer: COMMERCIAL

## 2025-06-16 VITALS — HEART RATE: 83 BPM

## 2025-06-16 PROCEDURE — 97110 THERAPEUTIC EXERCISES: CPT

## 2025-06-16 PROCEDURE — 97140 MANUAL THERAPY 1/> REGIONS: CPT

## 2025-06-16 ASSESSMENT — PAIN SCALES - GENERAL: PAINLEVEL_OUTOF10: 2

## 2025-06-16 ASSESSMENT — PAIN DESCRIPTION - LOCATION: LOCATION: NECK;SHOULDER

## 2025-06-16 ASSESSMENT — PAIN DESCRIPTION - ORIENTATION: ORIENTATION: RIGHT;LEFT;POSTERIOR

## 2025-06-16 NOTE — PROGRESS NOTES
spasm  Functional ability(s) targeted: Reaching overhead, Tolerance to age appropriate activities      *Indicates exercise, modality, or manual techniques to be initiated when appropriate    Objective Measures:           STG 1 Current Status:: 6/16: fair compliance  STG 2 Current Status:: 6/16: no sig improvement        Assessment:   Body Structures, Functions, Activity Limitations Requiring Skilled Therapeutic Intervention: Decreased ADL status, Decreased ROM, Decreased strength, Decreased posture, Increased pain  Assessment: Pt with increased dizziness/ lightheadedness during treatment. pt feels maybe from changing positions. Vitals WFL. Pt continues to express frustration with lack of diagnosis and increased stressors.Able to tolerate some manual therapy and limited ther ex this date. Discussed effects of stress on physical characteristics. Pt expresses understanding. Discussed need to continue to be engaged in activities that she enjoys.  Treatment Diagnosis: generalized pain, Lt UE pain, decreased Lt UE strength, decreased Lt UE ROM             Post-Pain Assessment:       Pain Rating (0-10 pain scale):   2/10   Location and pain description same as pre-treatment unless indicated.   Action: [x] NA   [] Perform HEP  [] Meds as prescribed  [] Modalities as prescribed   [] Call Physician     GOALS   Patient Goal(s): Patient Goals : decrease pain    Short Term Goals Completed by 3 wks Goal Status   STG 1 Independent with HEP to promote home management of symptoms In progress   STG 2 report 25% reduction in symptoms with improved ease with gardening and less staying in bed In progress     Long Term Goals Completed by 6 wks Goal Status   LTG 1 Improve Lt UE AROM WFL to perform ADLs with decreased modifications In progress   LTG 2 Improve Lt UE strength >/= 4+/5 to improve lifting and carrying In progress   LTG 3 UEFI >/= 60/80 to demonstrate improved overall activity tolerance In progress

## 2025-06-19 ENCOUNTER — HOSPITAL ENCOUNTER (OUTPATIENT)
Dept: PHYSICAL THERAPY | Age: 40
Setting detail: THERAPIES SERIES
Discharge: HOME | End: 2025-06-19
Payer: COMMERCIAL

## 2025-06-19 PROCEDURE — 97112 NEUROMUSCULAR REEDUCATION: CPT

## 2025-06-19 PROCEDURE — 97110 THERAPEUTIC EXERCISES: CPT

## 2025-06-19 ASSESSMENT — PAIN DESCRIPTION - LOCATION: LOCATION: NECK;SHOULDER

## 2025-06-19 ASSESSMENT — PAIN SCALES - GENERAL: PAINLEVEL_OUTOF10: 3

## 2025-06-19 NOTE — PROGRESS NOTES
HEP.  See objective section for any therapeutic exercise changes, additions or modifications this date.    Therapy Time:      PT Individual Minutes  Time In: 0924  Time Out: 1002  Minutes: 38  Timed Code Treatment Minutes: 38 Minutes  Procedure Minutes: N/A   Timed Activity Minutes Units   Ther Ex 23 2   Manual  5 0   Neuro Re-ed 10 1     Electronically signed by Diann Alejo PTA on 6/19/25 at 5:46 PM EDT

## 2025-06-23 ENCOUNTER — HOSPITAL ENCOUNTER (OUTPATIENT)
Dept: PHYSICAL THERAPY | Age: 40
Setting detail: THERAPIES SERIES
Discharge: HOME | End: 2025-06-23
Payer: COMMERCIAL

## 2025-06-23 NOTE — PROGRESS NOTES
Therapy                            Cancellation/No-show Note      Date: 2025  Patient: Bronwyn Sterling (40 y.o. female)  : 1985  MRN:  [id number]  Referring Physician: Manuel Nieto DO    Medical Diagnosis: Muscle wasting and atrophy, not elsewhere classified, unspecified shoulder [M62.519]  Pain in unspecified shoulder [M25.519]      Visit Information:  Visits to Date 4   No Show/Cancelled Appts: 0       For today's appointment patient:  [x]  Cancelled  []  Rescheduled appointment  []  No-show   []  Called pt to remind of next appointment     Reason given by patient:  []  Patient ill  []  Conflicting appointment  []  No transportation    []  Conflict with work  []  No reason given  [x]  Other: Taking son to ER     [x] Pt has future appointments scheduled, no follow up needed  [] Pt requests to be on hold.    Reason:   If > 2 weeks please discuss with therapist.  [] Therapist to call pt for follow up  []  to call to re-schedule      Comments:       Signature: Electronically signed by JACQUELINE JUAREZ PTA on 25 at 11:34 AM EDT

## 2025-06-26 ENCOUNTER — HOSPITAL ENCOUNTER (OUTPATIENT)
Dept: PHYSICAL THERAPY | Age: 40
Setting detail: THERAPIES SERIES
Discharge: HOME | End: 2025-06-26
Payer: COMMERCIAL

## 2025-06-26 NOTE — PROGRESS NOTES
Therapy                            Cancellation/No-show Note      Date: 2025  Patient: Bronwyn Sterling (40 y.o. female)  : 1985  MRN:  [id number]  Referring Physician: Manuel Nieto DO    Medical Diagnosis: Muscle wasting and atrophy, not elsewhere classified, unspecified shoulder [M62.519]  Pain in unspecified shoulder [M25.519]      Visit Information:  Visits to Date 4   No Show/Cancelled Appts: 0 / 2      For today's appointment patient:  [x]  Cancelled  []  Rescheduled appointment  []  No-show   []  Called pt to remind of next appointment     Reason given by patient:  []  Patient ill  []  Conflicting appointment  []  No transportation    []  Conflict with work  []  No reason given  [x]  Other: Son is sick     [x] Pt has future appointments scheduled, no follow up needed  [] Pt requests to be on hold.    Reason:   If > 2 weeks please discuss with therapist.  [] Therapist to call pt for follow up  [] [place] to call to re-schedule      Comments:       Signature: Electronically signed by JACQUELINE JUAREZ PTA on 25 at 10:50 AM EDT

## 2025-06-30 ENCOUNTER — HOSPITAL ENCOUNTER (OUTPATIENT)
Dept: PHYSICAL THERAPY | Age: 40
Setting detail: THERAPIES SERIES
Discharge: HOME | End: 2025-06-30
Payer: COMMERCIAL

## 2025-06-30 PROCEDURE — 97110 THERAPEUTIC EXERCISES: CPT

## 2025-06-30 ASSESSMENT — PAIN SCALES - GENERAL: PAINLEVEL_OUTOF10: 7

## 2025-06-30 ASSESSMENT — PAIN DESCRIPTION - ORIENTATION: ORIENTATION: LEFT;RIGHT

## 2025-06-30 ASSESSMENT — PAIN DESCRIPTION - LOCATION: LOCATION: NECK

## 2025-06-30 NOTE — PROGRESS NOTES
[facility]  Outpatient Physical Therapy    Treatment Note        Date: 2025  Patient: Bronwyn Sterling  : 1985   Confirmed: Yes  MRN: [id number]  Referring Provider: Manuel Nieto DO   Secondary Referring Provider (If applicable):     Medical Diagnosis: Muscle wasting and atrophy, not elsewhere classified, unspecified shoulder [M62.519]  Pain in unspecified shoulder [M25.519]    Treatment Diagnosis: generalized pain, Lt UE pain, decreased Lt UE strength, decreased Lt UE ROM    Visit Information:  Insurance: Payor: [employer] / Plan: [employer] / Product Type: *No Product type* /   PT Visit Information  Onset Date: 22  PT Insurance Information: [employer]  Total # of Visits to Date: 5  No Show: 0  Canceled Appointment: 2  Progress Note Counter:     Subjective Information:  Subjective: Pt reports 7/10 pain in back of shoulder. Pt arriving 5 min late to appt.  HEP Compliance:  [x] Good [] Fair [] Poor [] Reports not doing due to:    Pain Screening  Patient Currently in Pain: Yes  Pain Assessment: 0-10  Pain Level: 7  Pain Location: Neck  Pain Orientation: Left, Right    Treatment:  Exercises:  Exercises  Exercise 1: posture exs x10  Exercise 2: doorway str arms low 15\"x3  Exercise 5: Serratus wall reach x10 YTB loop  Exercise 8: barrel str 10\"x5 (pt c/o tingling in Lt hand)  Exercise 9: rows/lat- trial w/ increased resistance*  Exercise 10: Prone scap 4 way (rhmbds, rows, lats, LT- modified w/ hands behind head) x15 ea  Exercise 20: HEP: continue current    Objective Measures:       STG 1 Current Status:: : fair compliance  STG 2 Current Status:: : no sig improvement    Assessment:   Body Structures, Functions, Activity Limitations Requiring Skilled Therapeutic Intervention: Decreased ADL status, Decreased ROM, Decreased strength, Decreased posture, Increased pain  Assessment: Pt reports \"some\" improvement in pain since start of

## 2025-07-03 ENCOUNTER — HOSPITAL ENCOUNTER (OUTPATIENT)
Dept: PHYSICAL THERAPY | Age: 40
Setting detail: THERAPIES SERIES
Discharge: HOME | End: 2025-07-03
Payer: COMMERCIAL

## 2025-07-03 PROCEDURE — 97140 MANUAL THERAPY 1/> REGIONS: CPT

## 2025-07-03 PROCEDURE — 97110 THERAPEUTIC EXERCISES: CPT

## 2025-07-03 NOTE — PROGRESS NOTES
[facility]  Outpatient Physical Therapy    Treatment Note        Date: 7/3/2025  Patient: Bronwyn Sterling  : 1985   Confirmed: Yes  MRN: [id number]  Referring Provider: Manuel Nieto DO   Secondary Referring Provider (If applicable):     Medical Diagnosis: Muscle wasting and atrophy, not elsewhere classified, unspecified shoulder [M62.519]  Pain in unspecified shoulder [M25.519] atrophy of muscle of shoulder, sternoclavicular joint pain  Treatment Diagnosis: generalized pain, Lt UE pain, decreased Lt UE strength, decreased Lt UE ROM    Visit Information:  Insurance: Payor: [employer]AN / Plan: [employer] HE[employer] PLANS / Product Type: *No Product type* /   PT Visit Information  Onset Date: 22  PT Insurance Information: [employer]  Total # of Visits to Date: 6  No Show: 0  Canceled Appointment: 2  Progress Note Counter:     Subjective Information:  Subjective: Pt reports increased pain level of 8/10 since w/ inc pain since last therapy session.  HEP Compliance:  [] Good [] Fair [] Poor [] Reports not doing due to:    Treatment:  Exercises:  Exercises  Exercise 1: posture exs x10  Exercise 2: doorway str arms low 15\"x3  Exercise 4: Cervical ext and rot w/ towel 5\"x5 ea  Exercise 5: Serratus wall reach x10 YTB loop  Exercise 6: Quardruped cat/cow 10\"x10, Quad 3 way ext x10 ea  Exercise 8: barrel str 10\"x5 (pt c/o tingling in Lt hand)  Exercise 9: rows/lat- trial w/ increased resistance*  Exercise 10: Supine chest pulls 2x10 YTB  Exercise 20: HEP: continue current     Manual:   Manual Therapy  Other: MFD w/ cupping to b/l UT's     Modalities:  Moist Heat (CPT 52311)  Patient Position: Seated  Number Minutes Moist Heat: 10 min  Moist heat location: Cervical     *Indicates exercise, modality, or manual techniques to be initiated when appropriate    Objective Measures:      STG 1 Current Status:: : fair compliance  STG 2 Current Status:: : no sig

## 2025-07-08 ENCOUNTER — HOSPITAL ENCOUNTER (OUTPATIENT)
Dept: PHYSICAL THERAPY | Age: 40
Setting detail: THERAPIES SERIES
Discharge: HOME | End: 2025-07-08
Payer: COMMERCIAL

## 2025-07-08 PROCEDURE — 97110 THERAPEUTIC EXERCISES: CPT

## 2025-07-08 PROCEDURE — 97140 MANUAL THERAPY 1/> REGIONS: CPT

## 2025-07-08 ASSESSMENT — PAIN DESCRIPTION - LOCATION: LOCATION: NECK

## 2025-07-08 ASSESSMENT — PAIN SCALES - GENERAL: PAINLEVEL_OUTOF10: 5

## 2025-07-08 ASSESSMENT — PAIN DESCRIPTION - ORIENTATION: ORIENTATION: LEFT;RIGHT

## 2025-07-08 NOTE — PROGRESS NOTES
UA HCG IS NEG BY ME Activity Limitations Requiring Skilled Therapeutic Intervention: Decreased ADL status, Decreased ROM, Decreased strength, Decreased posture, Increased pain  Assessment: Pt w/ improving tolerance to exercise activity in clinic. Incorporated modified planks on elbows in support of core strengthening and scapular stability w/ pt tolerating well. Fair to good core strength observed given ability to maintain positioning for desired length of time. Discussed looking into local facilities which accodomate [employer] membership to further promote inc activity levels and intentional exercise to work towards self management of pain and physical limitations. Pt cont's to demo gaurding of Lt shldr AROM however, achieves full range flexion in saad pose and prone positions. Limited extension mobility through quarduped exs. Will cont to advance mobility and strength routine to improve daily activity tolerance. Cont'd relief w/ cupping txs.  Treatment Diagnosis: generalized pain, Lt UE pain, decreased Lt UE strength, decreased Lt UE ROM  Therapy Prognosis: Good     Post-Pain Assessment:       Pain Rating (0-10 pain scale):   Post pain level not provided   Location and pain description same as pre-treatment unless indicated.   Action: [] NA   [x] Perform HEP  [] Meds as prescribed  [x] Modalities as prescribed   [] Call Physician     GOALS   Patient Goal(s): Patient Goals : decrease pain    Short Term Goals Completed by 3 wks Goal Status   STG 1 Independent with HEP to promote home management of symptoms In progress   STG 2 report 25% reduction in symptoms with improved ease with gardening and less staying in bed In progress     Long Term Goals Completed by 6 wks Goal Status   LTG 1 Improve Lt UE AROM WFL to perform ADLs with decreased modifications In progress   LTG 2 Improve Lt UE strength >/= 4+/5 to improve lifting and carrying In progress   LTG 3 UEFI >/= 60/80 to demonstrate improved overall activity tolerance In

## 2025-07-15 ENCOUNTER — HOSPITAL ENCOUNTER (OUTPATIENT)
Dept: PHYSICAL THERAPY | Age: 40
Setting detail: THERAPIES SERIES
Discharge: HOME | End: 2025-07-15
Payer: COMMERCIAL

## 2025-07-15 PROCEDURE — 97110 THERAPEUTIC EXERCISES: CPT

## 2025-07-15 ASSESSMENT — PAIN DESCRIPTION - LOCATION: LOCATION: SHOULDER

## 2025-07-15 ASSESSMENT — PAIN DESCRIPTION - ORIENTATION: ORIENTATION: LEFT

## 2025-07-15 ASSESSMENT — PAIN SCALES - GENERAL: PAINLEVEL_OUTOF10: 4

## 2025-07-15 ASSESSMENT — PAIN DESCRIPTION - DESCRIPTORS: DESCRIPTORS: ACHING

## 2025-07-15 NOTE — PROGRESS NOTES
PHYSICAL THERAPY PLAN OF CARE   [facility]      [address], [address]   [place] [address]     Ph: [phone] Fax: [phone]      [] Certification  [] Recertification []  Plan of Care  [x] Progress Note [] Discharge      Referring Provider: Manuel Nieto DO      From:  Jeannine Maldonado, PT  Patient: Bronwyn Sterling (40 y.o. female) : 1985 Date: 07/15/2025   Medical Diagnosis: Muscle wasting and atrophy, not elsewhere classified, unspecified shoulder [M62.519]  Pain in unspecified shoulder [M25.519] atrophy of muscle of shoulder, sternoclavicular joint pain  Treatment Diagnosis: generalized pain, Lt UE pain, decreased Lt UE strength, decreased Lt UE ROM      Progress Report Period from:  2025  to 7/15/2025    Visits to Date: 8 No Show: 0 Cancelled Appts: 2    OBJECTIVE:   Short Term Goals - Time Frame for Short Term Goals: 3 wks    Goals Current/Discharge status  Status   Short Term Goal 1: Independent with HEP to promote home management of symptoms  STG 1 Current Status:: : fair compliance   In progress, partially met   Short Term Goal 2: report 25% reduction in symptoms with improved ease with gardening and less staying in bed  STG 2 Current Status:: 7/15: Pt reports 50% improvement; \"The neck cracking is not as often and I can tell that massage relieves the tension.\"   Partially Met      Long Term Goals - Time Frame for Long Term Goals : 6 wks  Goals Current/ Discharge status Status   Long Term Goal 1: Improve Lt UE AROM WFL to perform ADLs with decreased modifications    AROM LUE (degrees)  L Shoulder Flexion (0-180): 155  L Shoulder ABduction (0-180): 145  L Shoulder Int Rotation  (0-70): WFL  L Shoulder Ext Rotation  (0-90): WFL  Spine  Cervical: flex 44, ext 41, Lt SB 41, Rt SB 51   W/ painful gaurding In progress, Partially Met    Long Term Goal 2: Improve Lt UE strength >/= 4+/5 to improve lifting and carrying   Strength LUE  L Shoulder Flexion: 4/5, 
Limitations Requiring Skilled Therapeutic Intervention: Decreased ADL status, Decreased ROM, Decreased strength, Decreased posture, Increased pain  Assessment: Pt reports \"somewhat\" improvement in Lt shldr pain/function noting ~50% \"better\" since start of PT. Pt has upcoming F/U w/ PCP next week. Advised pt to express concerns regarding Rt knee instability D/T current tx diagnosis for Lt shldr. Discussed option of knee compression sleeve/bracing to provide support as needed. Despite improvement in function reported, pt cont's to c/o N+T into Lt hand w/ reaching away from body upon placing UE into test positions for MMT and discomfort limiting tolerance for further progression of ther exs. Decreased UEFI score as compared to eval.  Discussed posturing likely playing a role in additional shldr strain. Recommended trial of posture correcter to habitual training as well as to determine affects on shldr pain. Trial of KT for mechanical correction and tactile posture cues. Suggested seeking alternative purse style such as cross body or small back pack vs over the shoulder. Pt wishing to be placed on hold from PT to seek other options for tx.  Treatment Diagnosis: generalized pain, Lt UE pain, decreased Lt UE strength, decreased Lt UE ROM  Therapy Prognosis: Good    Post-Pain Assessment:       Pain Rating (0-10 pain scale):   4/10   Location and pain description same as pre-treatment unless indicated.   Action: [] NA   [x] Perform HEP  [] Meds as prescribed  [x] Modalities as prescribed   [] Call Physician     GOALS   Patient Goal(s): Patient Goals : decrease pain    Short Term Goals Completed by 3 wks Goal Status   STG 1 Independent with HEP to promote home management of symptoms In progress   STG 2 report 25% reduction in symptoms with improved ease with gardening and less staying in bed In progress     Long Term Goals Completed by 6 wks Goal Status   LTG 1 Improve Lt UE AROM WFL to perform ADLs with decreased

## 2025-07-17 ENCOUNTER — APPOINTMENT (OUTPATIENT)
Dept: PHYSICAL THERAPY | Age: 40
End: 2025-07-17
Payer: COMMERCIAL

## 2025-07-21 ENCOUNTER — APPOINTMENT (OUTPATIENT)
Dept: PHYSICAL THERAPY | Age: 40
End: 2025-07-21
Payer: COMMERCIAL

## 2025-07-22 ENCOUNTER — OFFICE VISIT (OUTPATIENT)
Age: 40
End: 2025-07-22
Payer: COMMERCIAL

## 2025-07-22 VITALS
TEMPERATURE: 98.2 F | HEART RATE: 89 BPM | WEIGHT: 127 LBS | OXYGEN SATURATION: 97 % | HEIGHT: 63 IN | DIASTOLIC BLOOD PRESSURE: 62 MMHG | BODY MASS INDEX: 22.5 KG/M2 | SYSTOLIC BLOOD PRESSURE: 118 MMHG

## 2025-07-22 DIAGNOSIS — M62.838 TRAPEZIUS MUSCLE SPASM: ICD-10-CM

## 2025-07-22 DIAGNOSIS — Z00.00 MEDICARE ANNUAL WELLNESS VISIT, SUBSEQUENT: Primary | ICD-10-CM

## 2025-07-22 DIAGNOSIS — R55 POSTURAL DIZZINESS WITH PRESYNCOPE: ICD-10-CM

## 2025-07-22 DIAGNOSIS — R00.2 PALPITATIONS: ICD-10-CM

## 2025-07-22 DIAGNOSIS — R42 POSTURAL DIZZINESS WITH PRESYNCOPE: ICD-10-CM

## 2025-07-22 DIAGNOSIS — Z88.9 MULTIPLE ALLERGIES: ICD-10-CM

## 2025-07-22 PROCEDURE — G2211 COMPLEX E/M VISIT ADD ON: HCPCS | Performed by: STUDENT IN AN ORGANIZED HEALTH CARE EDUCATION/TRAINING PROGRAM

## 2025-07-22 PROCEDURE — G0439 PPPS, SUBSEQ VISIT: HCPCS | Performed by: STUDENT IN AN ORGANIZED HEALTH CARE EDUCATION/TRAINING PROGRAM

## 2025-07-22 PROCEDURE — 99215 OFFICE O/P EST HI 40 MIN: CPT | Performed by: STUDENT IN AN ORGANIZED HEALTH CARE EDUCATION/TRAINING PROGRAM

## 2025-07-22 RX ORDER — CYCLOBENZAPRINE HCL 5 MG
5 TABLET ORAL 3 TIMES DAILY PRN
Qty: 90 TABLET | Refills: 1 | Status: SHIPPED | OUTPATIENT
Start: 2025-07-22 | End: 2025-08-21

## 2025-07-22 ASSESSMENT — PATIENT HEALTH QUESTIONNAIRE - PHQ9
8. MOVING OR SPEAKING SO SLOWLY THAT OTHER PEOPLE COULD HAVE NOTICED. OR THE OPPOSITE, BEING SO FIGETY OR RESTLESS THAT YOU HAVE BEEN MOVING AROUND A LOT MORE THAN USUAL: NOT AT ALL
4. FEELING TIRED OR HAVING LITTLE ENERGY: NEARLY EVERY DAY
9. THOUGHTS THAT YOU WOULD BE BETTER OFF DEAD, OR OF HURTING YOURSELF: NOT AT ALL
SUM OF ALL RESPONSES TO PHQ QUESTIONS 1-9: 12
2. FEELING DOWN, DEPRESSED OR HOPELESS: NOT AT ALL
6. FEELING BAD ABOUT YOURSELF - OR THAT YOU ARE A FAILURE OR HAVE LET YOURSELF OR YOUR FAMILY DOWN: NOT AT ALL
1. LITTLE INTEREST OR PLEASURE IN DOING THINGS: NOT AT ALL
SUM OF ALL RESPONSES TO PHQ QUESTIONS 1-9: 12
SUM OF ALL RESPONSES TO PHQ QUESTIONS 1-9: 12
5. POOR APPETITE OR OVEREATING: NEARLY EVERY DAY
10. IF YOU CHECKED OFF ANY PROBLEMS, HOW DIFFICULT HAVE THESE PROBLEMS MADE IT FOR YOU TO DO YOUR WORK, TAKE CARE OF THINGS AT HOME, OR GET ALONG WITH OTHER PEOPLE: SOMEWHAT DIFFICULT
SUM OF ALL RESPONSES TO PHQ QUESTIONS 1-9: 12
3. TROUBLE FALLING OR STAYING ASLEEP: NEARLY EVERY DAY
7. TROUBLE CONCENTRATING ON THINGS, SUCH AS READING THE NEWSPAPER OR WATCHING TELEVISION: NEARLY EVERY DAY

## 2025-07-22 ASSESSMENT — LIFESTYLE VARIABLES
HOW OFTEN DO YOU HAVE A DRINK CONTAINING ALCOHOL: NEVER
HOW MANY STANDARD DRINKS CONTAINING ALCOHOL DO YOU HAVE ON A TYPICAL DAY: PATIENT DOES NOT DRINK

## 2025-07-22 NOTE — PROGRESS NOTES
facility-administered medications for this visit.     Allergies, PMH, Surgical Hx, Family Hx, and Social Hx reviewed and updated.  Health Maintenance reviewed.    Objective    Vitals:    07/22/25 0956   BP: 118/62   Pulse: 89   Temp: 98.2 °F (36.8 °C)   TempSrc: Temporal   SpO2: 97%   Weight: 57.6 kg (127 lb)   Height: 1.6 m (5' 3\")       Physical Exam  Vitals reviewed.   Constitutional:       Appearance: Normal appearance.   HENT:      Head: Normocephalic and atraumatic.      Nose: Nose normal.      Mouth/Throat:      Mouth: Mucous membranes are moist.   Eyes:      Conjunctiva/sclera: Conjunctivae normal.   Pulmonary:      Effort: Pulmonary effort is normal.   Skin:     General: Skin is warm and dry.   Neurological:      General: No focal deficit present.      Mental Status: She is alert. Mental status is at baseline.   Psychiatric:         Mood and Affect: Mood normal.         Behavior: Behavior normal.            Assessment & Plan     Assessment & Plan  Palpitations.  Not currently controlled.  - Patient reports experiencing palpitations.  - Previous Holter monitor results were not discussed with the patient.  - Referral to [facility] for further evaluation of palpitations.  - Patient advised to contact [facility] to schedule an appointment.    Presyncope.  - Patient reports episodes of presyncope and dizziness upon standing.  - Tilt table test results were reportedly normal despite symptomatic episodes.  - Referral to [facility] for further evaluation of presyncope.  - Patient advised to contact [facility] to schedule an appointment.    Postural dizziness.  - Patient reports experiencing dizziness when standing up.  - Symptoms include almost passing out and feeling lightheaded.  - Referral to [facility] for further evaluation of postural dizziness.  - Patient advised to contact [facility] to schedule an appointment.    Allergies.  - Patient reports new

## 2025-07-29 ENCOUNTER — APPOINTMENT (OUTPATIENT)
Dept: PHYSICAL THERAPY | Age: 40
End: 2025-07-29
Payer: COMMERCIAL

## 2025-07-31 ENCOUNTER — APPOINTMENT (OUTPATIENT)
Dept: PHYSICAL THERAPY | Age: 40
End: 2025-07-31
Payer: COMMERCIAL

## (undated) DEVICE — BRUSH ENDO CLN L90.5IN SHTH DIA1.7MM BRIST DIA5-7MM 2-6MM

## (undated) DEVICE — GLOVE ORANGE PI 7 1/2   MSG9075

## (undated) DEVICE — DRESSING HYDROFIBER AQUACEL AG ADVANTAGE 3.5X6 IN

## (undated) DEVICE — GOWN,AURORA,NONREINFORCED,LARGE: Brand: MEDLINE

## (undated) DEVICE — COVER LT HNDL BLU PLAS

## (undated) DEVICE — GOWN,AURORA,NONRNF,XL,30/CS: Brand: MEDLINE

## (undated) DEVICE — COUNTER NDL 40 COUNT HLD 70 FOAM BLK ADH W/ MAG

## (undated) DEVICE — SYRINGE MED 10ML TRNSLUC BRL PLUNG BLK MRK POLYPR CTRL

## (undated) DEVICE — BLADE,CARBON-STEEL,11,STRL,DISPOSABLE,TB: Brand: MEDLINE

## (undated) DEVICE — NEURO PACK: Brand: MEDLINE INDUSTRIES, INC.

## (undated) DEVICE — INTENDED FOR TISSUE SEPARATION, AND OTHER PROCEDURES THAT REQUIRE A SHARP SURGICAL BLADE TO PUNCTURE OR CUT.: Brand: BARD-PARKER ® STAINLESS STEEL BLADES

## (undated) DEVICE — PAD BD FOAM 33X72X2.75 IN BLU EGGCRATE

## (undated) DEVICE — SUTURE VCRL SZ 2-0 L27IN ABSRB UD L36MM CP-1 1/2 CIR REV J266H

## (undated) DEVICE — ELECTRODE PT RET AD L9FT HI MOIST COND ADH HYDRGEL CORDED

## (undated) DEVICE — SUTURE VCRL SZ 0 L27IN ABSRB UD L26MM CP-2 1/2 CIR SGL J870H

## (undated) DEVICE — TUBING INSUFFLATION SMK EVAC HI FLO SET PNEUMOCLEAR

## (undated) DEVICE — BLADE,CARBON-STEEL,15,STRL,DISPOSABLE,TB: Brand: MEDLINE

## (undated) DEVICE — GUIDEWIRE ENDO L210CM MRK SPR TIP SAFEGUIDE

## (undated) DEVICE — 1010 S-DRAPE TOWEL DRAPE 10/BX: Brand: STERI-DRAPE™

## (undated) DEVICE — SHEARS ENDOSCP HARM 36CM ULTRASONIC CRV TIP UPGRD

## (undated) DEVICE — GLOVE SURG SZ 9 THK91MIL LTX FREE SYN POLYISOPRENE ANTI

## (undated) DEVICE — GAUZE,SPONGE,4"X4",16PLY,XRAY,STRL,LF: Brand: MEDLINE

## (undated) DEVICE — FORCEPS BX L240CM JAW DIA2.8MM L CAP W/ NDL MIC MESH TOOTH

## (undated) DEVICE — APPLICATOR MEDICATED 26 CC SOLUTION HI LT ORNG CHLORAPREP

## (undated) DEVICE — LABEL MED MINI W/ MARKER

## (undated) DEVICE — SYRINGE IRRIG 60ML SFT PLIABLE BLB EZ TO GRP 1 HND USE W/

## (undated) DEVICE — COVER,TABLE,44X90,STERILE: Brand: MEDLINE

## (undated) DEVICE — GLOVE ORANGE PI 8 1/2   MSG9085

## (undated) DEVICE — SINGLE PORT MANIFOLD: Brand: NEPTUNE 2

## (undated) DEVICE — WARMER SCP 2 ANTIFOG LAP DISP

## (undated) DEVICE — KIT ARMOR C DRP COLLAPSIBLE AND SELF EXP TOP CVR FOR FLUOROSCOPIC

## (undated) DEVICE — SYRINGE MED 30ML STD CLR PLAS LUERLOCK TIP N CTRL DISP

## (undated) DEVICE — DRAPE, LAVH, STERILE: Brand: MEDLINE

## (undated) DEVICE — KIT,ANTI FOG,W/SPONGE & FLUID,SOFT PACK: Brand: MEDLINE

## (undated) DEVICE — LIQUIBAND RAPID ADHESIVE 36/CS 0.8ML: Brand: MEDLINE

## (undated) DEVICE — SUTURE VCRL SZ 4-0 L18IN ABSRB UD L19MM PS-2 3/8 CIR PRIM J496H

## (undated) DEVICE — SUTURE VCRL SZ 3-0 L18IN ABSRB UD PS-2 L19MM 3/8 CRV PRIM J497H

## (undated) DEVICE — NEEDLE INSUF L120MM ULT VERES ENDOPATH

## (undated) DEVICE — SPONGE,LAP,18"X18",DLX,XR,ST,5/PK,40/PK: Brand: MEDLINE

## (undated) DEVICE — TOWEL,OR,DSP,ST,BLUE,STD,4/PK,20PK/CS: Brand: MEDLINE

## (undated) DEVICE — APPLICATOR MEDICATED 10.5 CC SOLUTION HI LT ORNG CHLORAPREP

## (undated) DEVICE — WRAP COHESIVE W2INXL5YD TAN SELF ADH BNDG HND NON STERILE TEAR CARING

## (undated) DEVICE — DISSECTOR LAP DIA5MM BLNT TIP ENDOPATH

## (undated) DEVICE — ENDO CARRY-ON PROCEDURE KIT: Brand: ENDO CARRY-ON PROCEDURE KIT

## (undated) DEVICE — SWABSTICK MEDICATED 10% POVIDONE IOD PVP SGL ANTISEP SAT

## (undated) DEVICE — HYPODERMIC SAFETY NEEDLE: Brand: MAGELLAN

## (undated) DEVICE — 4-PORT MANIFOLD: Brand: NEPTUNE 2

## (undated) DEVICE — SUTURE POLYESTER STRATAFIX V-34 SXPP1B457

## (undated) DEVICE — TROCAR: Brand: KII FIOS FIRST ENTRY

## (undated) DEVICE — CARBIDE MATCH HEAD

## (undated) DEVICE — STRIP,CLOSURE,WOUND,MEDI-STRIP,1/2X4: Brand: MEDLINE

## (undated) DEVICE — DRAPE EQUIP MICSCP 120X48 IN ANGLED GLS LENS HSNG FOR LEICA

## (undated) DEVICE — SUTURE VCRL SZ 0 L36IN ABSRB UD L36MM CT-1 1/2 CIR J946H

## (undated) DEVICE — SUTURE MCRYL SZ 4-0 L27IN ABSRB UD L19MM PS-2 1/2 CIR PRIM Y426H

## (undated) DEVICE — DEVICE TRCR 12X9X3IN WHT CLSR DISP OMNICLOSE

## (undated) DEVICE — KAIRISON TUBING SET PNEUMATIC, (3000 MM), STERILE, DISPOSABLE, TO BE USED WITH: FK898R, PACKAGE OF 10 PIECES: Brand: KAIRISON

## (undated) DEVICE — PACK,LAPAROTOMY,NO GOWNS: Brand: MEDLINE

## (undated) DEVICE — Device: Brand: ENDO SMARTCAP

## (undated) DEVICE — PROGRAMMER SMART COMM W/HANDSET F/SACRAL NRVE STIMULATORS

## (undated) DEVICE — POUCH, INSTRUMENT, 3POCKET, INVISISHIELD: Brand: MEDLINE

## (undated) DEVICE — TUBING, SUCTION, 1/4" X 10', STRAIGHT: Brand: MEDLINE

## (undated) DEVICE — FLOSEAL HEMOSTATIC MATRIX, 10ML: Brand: FLOSEAL HEMOSTATIC MATRIX

## (undated) DEVICE — MARKER SURG SKIN GENTIAN VLT REG TIP W/ 6IN RUL DYNJSM01

## (undated) DEVICE — PACK,BASIC: Brand: MEDLINE

## (undated) DEVICE — SKIN PREP TRAY 4 COMPARTM TRAY: Brand: MEDLINE INDUSTRIES, INC.

## (undated) DEVICE — TUBE SET 96 MM 64 MM H2O PERISTALTIC STD AUX CHANNEL

## (undated) DEVICE — 1LYRTR 16FR10ML 100%SILI SNAP: Brand: MEDLINE INDUSTRIES, INC.

## (undated) DEVICE — CLEANING BRUSH - SINGLE USE: Brand: SAFEGUIDE®

## (undated) DEVICE — GOWN,SIRUS,POLYRNF,BRTHSLV,XLN/XL,20/CS: Brand: MEDLINE

## (undated) DEVICE — NEPTUNE E-SEP SMOKE EVACUATION PENCIL, COATED, 70MM BLADE, PUSH BUTTON SWITCH: Brand: NEPTUNE E-SEP

## (undated) DEVICE — SUTURE PERMAHAND SZ 2-0 L12X18IN NONABSORBABLE BLK SILK A185H

## (undated) DEVICE — SPONGE GZ W4XL4IN RAYON POLY FILL CVR W/ NONWOVEN FAB

## (undated) DEVICE — LINER INCONT W7XL14IN PEACH POLYMER FLUF ADH WT CNTOUR DLX

## (undated) DEVICE — SHEET BD STD REPOS LO FICT BTM SFT TOP NO STRP 9 HNDL PER

## (undated) DEVICE — Device

## (undated) DEVICE — GLOVE ORANGE PI 8   MSG9080

## (undated) DEVICE — VCARE MEDIUM, UTERINE MANIPULATOR, VAGINAL-CERVICAL-AHLUWALIA'S-RETRACTOR-ELEVATOR: Brand: VCARE

## (undated) DEVICE — SUTURE VCRL SZ 2-0 L36IN ABSRB UD L36MM CT-1 1/2 CIR J945H

## (undated) DEVICE — CONMED SCOPE SAVER BITE BLOCK, 20X27 MM: Brand: SCOPE SAVER